# Patient Record
Sex: FEMALE | Race: WHITE | NOT HISPANIC OR LATINO | Employment: OTHER | URBAN - METROPOLITAN AREA
[De-identification: names, ages, dates, MRNs, and addresses within clinical notes are randomized per-mention and may not be internally consistent; named-entity substitution may affect disease eponyms.]

---

## 2017-01-24 ENCOUNTER — HISTORICAL (OUTPATIENT)
Dept: ADMINISTRATIVE | Facility: HOSPITAL | Age: 67
End: 2017-01-24

## 2017-03-07 ENCOUNTER — HISTORICAL (OUTPATIENT)
Dept: ADMINISTRATIVE | Facility: HOSPITAL | Age: 67
End: 2017-03-07

## 2017-03-21 ENCOUNTER — HISTORICAL (OUTPATIENT)
Dept: LAB | Facility: HOSPITAL | Age: 67
End: 2017-03-21

## 2017-03-27 ENCOUNTER — HISTORICAL (OUTPATIENT)
Dept: ADMINISTRATIVE | Facility: HOSPITAL | Age: 67
End: 2017-03-27

## 2017-04-18 ENCOUNTER — HISTORICAL (OUTPATIENT)
Dept: ADMINISTRATIVE | Facility: HOSPITAL | Age: 67
End: 2017-04-18

## 2017-05-18 ENCOUNTER — HISTORICAL (OUTPATIENT)
Dept: ADMINISTRATIVE | Facility: HOSPITAL | Age: 67
End: 2017-05-18

## 2018-05-23 ENCOUNTER — HISTORICAL (OUTPATIENT)
Dept: RADIOLOGY | Facility: HOSPITAL | Age: 68
End: 2018-05-23

## 2019-10-15 ENCOUNTER — HISTORICAL (OUTPATIENT)
Dept: ADMINISTRATIVE | Facility: HOSPITAL | Age: 69
End: 2019-10-15

## 2020-02-28 ENCOUNTER — HOSPITAL ENCOUNTER (OUTPATIENT)
Dept: MEDSURG UNIT | Facility: HOSPITAL | Age: 70
End: 2020-03-02
Attending: INTERNAL MEDICINE | Admitting: INTERNAL MEDICINE

## 2022-04-07 ENCOUNTER — HISTORICAL (OUTPATIENT)
Dept: ADMINISTRATIVE | Facility: HOSPITAL | Age: 72
End: 2022-04-07

## 2022-04-24 VITALS
WEIGHT: 213.88 LBS | BODY MASS INDEX: 35.63 KG/M2 | HEIGHT: 65 IN | SYSTOLIC BLOOD PRESSURE: 122 MMHG | DIASTOLIC BLOOD PRESSURE: 71 MMHG

## 2022-05-01 NOTE — HISTORICAL [facility]
This is a historical note converted from [employer]. Formatting and pictures may have been removed.  Please reference [facility] for original formatting and attached multimedia. Admit and Discharge Dates  Admit Date: 02/28/2020  Discharge Date: 03/02/2020  Physicians  Attending Physician - Isreal ORANTES, Elba  Admitting Physician - Isreal ORANTES, Elba  Primary Care Physician - Monika ORANTES, Jenny ASHRAF  Discharge Diagnosis  COPD with exacerbation?J44.1  Coronary artery disease?I25.10  Cough?[id number]  DM (diabetes mellitus)?E11.9  Hypertension  Anxiety/depression  History of YE  Hospital Course  69 -year-old? female with past medical history of?COPD, hypertension, diabetes mellitus, coronary artery disease YE admitted on 2/28 for?acute COPD exacerbation.??Chest x-ray was negative for any acute infiltrates. ?She was treated?for COPD exacerbation with IV?Solu-Medrol,?nebulizer treatments, Breo and oral Levaquin. ?Patient improved over next?2-3 days and was stable for discharge?on 3/2 with outpatient prednisone taper. ?She does not need?further antibiotics at this point. ?Patients blood glucose was elevated secondary to?IV steroids. ?Patient is requesting?short acting insulin with SSI on discharge.? She was given a prescription for NovoLog insulin.? She is to continue on her Lantus.? Patient is to resume her Symbicort and?Umeclidinium on discharge.? Patient was referred to?pulmonary MD outpatient.  Objective  Vitals & Measurements  T:?36.9? ?C (Oral)? TMIN:?36.4? ?C (Oral)? TMAX:?36.9? ?C (Oral)? HR:?90(Peripheral)? RR:?18? BP:?137/69? SpO2:?93%?  Physical Exam  General:?well-developed well-nourished in no acute distress  Respiratory:?clear to auscultation bilaterally  Cardiovascular:?regular rate and rhythm without murmurs, gallops or rubs, no edema  Gastrointestinal:?soft, non-tender, non-distended with normal bowel sounds, without masses to palpation  Neurologic: cranial nerves intact, no focal  deficits, motor/sensory function intact  Patient Discharge Condition  stable  Discharge Disposition  Home   Discharge Medication Reconciliation  Prescribed  budesonide-formoterol (Symbicort 160 mcg-4.5 mcg/inh inhalation aerosol)?2 puff(s), INH, BID  insulin aspart (NovoLOG PenFill 100 units/mL injectable solution)?See Instructions  predniSONE (prednisONE 10 mg oral tablet)?See Instructions  Continue  albuterol (Ventolin HFA 90 mcg/inh inhalation aerosol)?2 puff(s), INH, BID  albuterol-ipratropium (DuoNeb 0.5 mg-2.5 mg/3 mL inhalation solution)?3 mL, NEB, QID, PRN as needed for shortness of breath or wheezing  amlodipine (Norvasc 5 mg oral tablet)?5 mg, Oral, Daily  aspirin (Aspir 81 oral delayed release tablet)  atorvastatin (atorvastatin 40 mg oral tablet)?40 mg, Oral, qPM  diazepam (DIAZEPAM ? ? TAB 10MG)?10 mg, Oral, BID  escitalopram (Lexapro 20 mg oral tablet)?20 mg, Oral, Daily  gabapentin (GABAPENTIN ? CAP 300MG)?300 mg, Oral, BID  insulin glargine (Lantus 100 units/mL subcutaneous solution)?45 units, Subcutaneous, Once a day (at bedtime)  isosorbide mononitrate (ISOSORB MONO TAB 30MG ER)?30 mg, Oral, Daily  metoprolol (METOPROL SUC TAB 25MG ER)?25 mg, Oral, Daily  nitroglycerin (Nitrostat 0.4 mg sublingual tablet)?0.4 mg, SL, q5min, PRN for chest pain  omeprazole (omeprazole 20 mg oral DR capsule)?20 mg, Oral, Daily  polyethylene glycol 3350 (polyethylene glycol 3350 oral powder for reconstitution)?17 gm, Oral, Daily, PRN constipation  Discontinue  hydrOXYzine (hydrOXYzine hydrochloride 25 mg oral tablet)?25 mg, Oral, Daily, PRN itching  insulin regular (NovoLIN R 100 units/mL injectable solution)?See Instructions  meclizine (meclizine 25 mg oral tablet)?25 mg, Oral, TID, PRN for dizziness  meloxicam (Mobic 7.5 mg oral tablet)?7.5 mg, Oral, Daily  ondansetron (Zofran ODT 4 mg oral tablet, disintegrating)?1, Oral, q4hr, PRN nausea/vomiting  promethazine (Phenergan 12.5 mg Tab)?12.5 mg, Oral, q4hr, PRN for  nausea/vomiting  Education and Orders Provided  Chronic Obstructive Pulmonary Disease  Heart Failure (PMUSSO)  Discharge - 03/02/20 11:27:00 CST, Home, Give all scheduled vaccinations prior to discharge.?  Discharge Activity - Activity as Tolerated?  Discharge Diet - Diabetic?  Follow up  Follow up with primary care provider, within 1 to 2 weeks  Tay ORANTES, Prabhu ARIAS, within 2 ?to 4 weeks

## 2022-05-01 NOTE — HISTORICAL [facility]
This is a historical note converted from [employer]. Formatting and pictures may have been removed.  Please reference [employer] for original formatting and attached multimedia. Chief Complaint  patient reports having cough and congestion x4 months. now having R sided chest tightness and worsening SOB. hx COPD.  History of Present Illness  69-year-old white female with past medical history of?COPD, hypertension, diabetes mellitus, coronary artery disease YE presented to the ER with complaints of shortness of breath and wheezing. ?Patient reports that she has been having shortness of breath for the past few weeks but apparently last night and this morning she started hearing a lot of wheezing she took the inhalers at home but her wheezing did not resolve so she decided to come into the ER. ?Denies any fever chills or night sweats. ?Chest x-ray done here in the ER was negative for any acute infiltrates. ?She has been admitted to hospitalist service for COPD exacerbation  ?  Review of Systems  except as documented all other systems reviewed and negative  Physical Exam  Vitals & Measurements  T:?36.6? ?C (Oral)? HR:?63(Peripheral)? RR:?18? BP:?124/49? SpO2:?95%?  General:AAOX3  Eye: PERRLA, EOMI, clear conjunctiva, eyelids normal  HENT:?at/nc  Neck: full range of motion, no thyromegaly or lymphadenopathy  Respiratory:?Bilateral wheezing  Cardiovascular:?regular rate and rhythm without murmurs, gallops or rubs  Gastrointestinal:?soft, non-tender, non-distended with normal bowel sounds, without masses to palpation  Genitourinary: no CVA tenderness to palpation  Musculoskeletal:?full range of motion of all extremities/spine without limitation or discomfort  Integumentary: no rashes or skin lesions present  Neurologic: AAOX3  ?  Assessment/Plan  COPD exacerbation  Essential hypertension  Insulin-dependent diabetes mellitus type 2  Anxiety depression  Obstructive sleep apnea  ?  ?  We will start the patient on Solu-Medrol 60 mg IV  every 8, duo nebs, levaquin 500 mg p.o. daily  We will resume home blood pressure medications  We will start on home dose of insulin along with insulin sliding scale with Accu-Cheks before meals and at bedtime  ?  ?   Prophylaxis: Lovenox  ?   Problem List/Past Medical History  Ongoing  COPD (chronic obstructive pulmonary disease)  DM (diabetes mellitus)  Dyslipidemia  HTN  Obesity  Osteoarthritis of left ankle  Peroneal tendinitis of left lower leg  Preoperative clearance  Right rotator cuff tear  Status post rotator cuff repair  Historical  Anxiety  Arthritis  CHF - Congestive heart failure  Coronary artery disease  Depression  Kidney stones  Memory loss  PNA (pneumonia)  Reflux  Rheumatoid arthritis  Rib fracture  Sleep apnea  Procedure/Surgical History  Arthroscopy Shoulder (Right) (03/27/2017)  Arthroscopy, shoulder, surgical; biceps tenodesis (03/27/2017)  Arthroscopy, shoulder, surgical; distal claviculectomy including distal articular surface (Ernie procedure) (03/27/2017)  Arthroscopy, shoulder, surgical; with lysis and resection of adhesions, with or without manipulation (03/27/2017)  Arthroscopy, shoulder, surgical; with rotator cuff repair (03/27/2017)  Excision of Right Clavicle, Percutaneous Endoscopic Approach (03/27/2017)  Release Right Shoulder Joint, Percutaneous Endoscopic Approach (03/27/2017)  Repair Right Shoulder Tendon, Percutaneous Endoscopic Approach (03/27/2017)  Reposition Right Shoulder Tendon, Percutaneous Endoscopic Approach (03/27/2017)  CABG (2016)  Insertion of non-indwelling bladder catheter (eg, straight catheterization for residual urine) (06/26/2015)  cardiac stent  Cholecystectomy  Hernia repair  Hysterectomy   Medications  Inpatient  acetaminophen, 1000 mg= 2 tab(s), Oral, q6hr, PRN  albuterol 0.083% inhalation solution, 5 mg= 6 mL, NEB, Once-NOW, PRN  Aspir 81 oral delayed release tablet, 81 mg, Oral, Daily  atorvastatin 40 mg oral tablet, 40 mg= 1 tab(s), Oral,  qPM  DIAZepam 10 mg oral tablet, 10 mg= 2 tab(s), Oral, BID  DuoNeb, 3 mL, NEB, q6hr, PRN  DuoNeb, 3 mL, NEB, q4hr Resp  fluticasone-vilanterol 200 mcg-25 mcg/inh inhalation powder, 1 inh, INH, Daily  gabapentin 300 mg oral capsule, 300 mg= 1 cap(s), Oral, BID  ISOSORB MONO TAB 30MG ER, 30 mg, Oral, Daily  Lantus 100 units/mL subcutaneous solution, 45 units, Subcutaneous, Once a day (at bedtime)  Levaquin 500 mg oral tablet, 500 mg= 1 tab(s), Oral, Daily  Lexapro 10 mg oral tablet, 20 mg= 2 tab(s), Oral, Daily  METOPROL SUC TAB 25MG ER, 25 mg, Oral, Daily  Norco 5 mg-325 mg oral tablet, 1 tab(s), Oral, BID, PRN  Norvasc 5 mg oral tablet, 5 mg= 1 tab(s), Oral, Daily  Solumedrol IV push / IM, 60 mg= 1.5 mL, IV Push, q8hr  Zofran, 4 mg= 2 mL, IV Push, q4hr, PRN  Zofran 2 mg/mL injectable solution, 4 mg= 2 mL, IV Push, q4hr, PRN  Home  Aspir 81 oral delayed release tablet  atorvastatin 40 mg oral tablet, 40 mg= 1 tab(s), Oral, qPM  DIAZEPAM TAB 10MG, 10 mg= 1 tab(s), Oral, BID  DuoNeb 0.5 mg-2.5 mg/3 mL inhalation solution, 3 mL, NEB, QID, PRN  GABAPENTIN CAP 300MG, 300 mg= 1 cap(s), Oral, BID  hydrOXYzine hydrochloride 25 mg oral tablet, 25 mg= 1 tab(s), Oral, Daily, PRN  ISOSORB MONO TAB 30MG ER, 30 mg= 1 tab(s), Oral, Daily  Lantus 100 units/mL subcutaneous solution, 45 units, Subcutaneous, Once a day (at bedtime)  Lexapro 20 mg oral tablet, 20 mg= 1 tab(s), Oral, Daily  meclizine 25 mg oral tablet, 25 mg= 1 tab(s), Oral, TID, PRN  METOPROL SUC TAB 25MG ER, 25 mg= 1 tab(s), Oral, Daily  Mobic 7.5 mg oral tablet, 7.5 mg= 1 tab(s), Oral, Daily  Nitrostat 0.4 mg sublingual tablet, 0.4 mg= 1 tab(s), SL, q5min, PRN  Norvasc 5 mg oral tablet, 5 mg= 1 tab(s), Oral, Daily  NovoLIN R 100 units/mL injectable solution, See Instructions  omeprazole 20 mg oral DR capsule, 20 mg= 1 cap(s), Oral, Daily  Phenergan 12.5 mg Tab, 12.5 mg= 1 tab(s), Oral, q4hr, PRN  polyethylene glycol 3350 oral powder for reconstitution, 17 gm, Oral,  Daily, PRN  Symbicort 160 mcg-4.5 mcg/inh inhalation aerosol, 2 puff(s), INH, BID  Ventolin HFA 90 mcg/inh inhalation aerosol, 2 puff(s), INH, BID  Zofran ODT 4 mg oral tablet, disintegrating, 1, Oral, q4hr, PRN  Allergies  sulfa drugs?(bandaid)  Social History  Abuse/Neglect  No, 02/28/2020  No, 11/21/2019  Alcohol - Denies Alcohol Use, 06/17/2012  Employment/School  Home/Environment  Lives with Spouse., 03/24/2017  Nutrition/Health  Regular, 03/24/2017  Substance Use - Denies Substance Abuse, 06/17/2012  Tobacco - Denies Tobacco Use, 06/26/2015  Never (less than 100 in lifetime), No, 02/28/2020  Former smoker, quit more than 30 days ago, No, 11/21/2019  Family History  Family history is negative  Lab Results  Labs Last 24 Hours?  ?Chemistry? Hematology/Coagulation? Blood Gases?   Sodium Lvl: 138 mmol/L (02/28/20 12:15:00) WBC: 7 x10(3)/mcL (02/28/20 12:15:00) Sample ABG: art (02/28/20 15:30:00)   Potassium Lvl: 4.9 mmol/L (02/28/20 12:15:00) RBC:?4.01 x10(6)/mcL?Low (02/28/20 12:15:00) Treatment: RA (02/28/20 15:30:00)   Chloride: 106 mmol/L (02/28/20 12:15:00) Hgb: 12 gm/dL (02/28/20 12:15:00) Site: Radial Lt (02/28/20 15:30:00)   CO2: 29 mmol/L (02/28/20 12:15:00) Hct: 38.3 % (02/28/20 12:15:00) pH Art: 7.37 (02/28/20 15:30:00)   Calcium Lvl: 9.2 mg/dL (02/28/20 12:15:00) Platelet: 234 x10(3)/mcL (02/28/20 12:15:00) pCO2 Art: 44 mmHg (02/28/20 15:30:00)   Glucose Lvl:?162 mg/dL?High (02/28/20 12:15:00) MCV:?95.5 fL?High (02/28/20 12:15:00) pO2 Art:?66 mmHg?Low (02/28/20 15:30:00)   BUN: 15 mg/dL (02/28/20 12:15:00) MCH: 29.9 pg (02/28/20 12:15:00) HCO3 Art: 25.4 mmol/L (02/28/20 15:30:00)   Creatinine: 0.86 mg/dL (02/28/20 12:15:00) MCHC:?31.3 gm/dL?Low (02/28/20 12:15:00) CO2 Totl Art: 26.8 mmol/L (02/28/20 15:30:00)   eGFR-AA: >60 (02/28/20 12:15:00) RDW: 13.1 % (02/28/20 12:15:00) O2 Sat Art:?92.1 %?Low (02/28/20 15:30:00)   eGFR-AZIZA: >60 (02/28/20 12:15:00) MPV: 9.9 fL (02/28/20 12:15:00) D base: -0.1  (20 15:30:00)   Bili Total: 0.2 mg/dL (20:15:00) Abs Neut: 4.89 x10(3)/mcL (20:15:00) THB AB gm/dL (20 15:30:00)   Bili Direct: 0 mg/dL (20:15:00) Neutro Auto: 70 % (20:15:00) CO Hgb: 000.9 % (20 15:30:00)   Bili Indirect: 0.2 mg/dL (20:15:00) Lymph Auto: 16 % (20:15:00) Met Hgb Art: 0.8 % (02/28/20 15:30:00)   AST: 16 unit/L (20:15:00) Mono Auto: 7 % (20:15:00) O2 Hgb:?93.8 %?Low (02/28/20 15::00)   ALT: 24 unit/L (20:15:00) Eos Auto: 5 % (20:15:00) CaO2: 15.9 mL/dL (20 15:30:00)   Alk Phos:?129 unit/L?High (20 12:15:00) Abs Eos: 0.4 x10(3)/mcL (20:15:00) Ionized Calcium: 1.2 mmol/L (02/28/20 15:30:00)   Total Protein: 6.9 gm/dL (20:15:00) Basophil Auto: 1 % (20:15:00) Sodium RT: 137 mmol/L (20 15:30:00)   Albumin Lvl: 3.7 gm/dL (20:15:00) Abs Neutro: 4.89 x10(3)/mcL (20:15:00) Potassium RT: 4.3 mmol/L (02/28/20 15:30:00)   Globulin: 3.2 gm/dL (20 12:15:00) Abs Lymph: 1.1 x10(3)/mcL (20 12:15:00) Allens: N/A (20 15:30:00)   A/G Ratio: 1.2 (20 12:15:00) Abs Mono: 0.5 x10(3)/mcL (20 12:15:00)    POC Troponin: 0.01 ng/mL (20 12:19:00) Abs Baso: 0 x10(3)/mcL (20 12:15:00)

## 2022-05-26 DIAGNOSIS — R25.1 TREMOR: Primary | ICD-10-CM

## 2023-07-27 DIAGNOSIS — M25.512 LEFT SHOULDER PAIN: Primary | ICD-10-CM

## 2023-08-08 ENCOUNTER — OFFICE VISIT (OUTPATIENT)
Dept: ORTHOPEDICS | Facility: CLINIC | Age: 73
End: 2023-08-08
Payer: MEDICARE

## 2023-08-08 VITALS
SYSTOLIC BLOOD PRESSURE: 111 MMHG | HEART RATE: 56 BPM | DIASTOLIC BLOOD PRESSURE: 71 MMHG | WEIGHT: 213.88 LBS | BODY MASS INDEX: 36.51 KG/M2 | HEIGHT: 64 IN

## 2023-08-08 DIAGNOSIS — S46.012A TRAUMATIC INCOMPLETE TEAR OF LEFT ROTATOR CUFF, INITIAL ENCOUNTER: ICD-10-CM

## 2023-08-08 PROCEDURE — 1160F RVW MEDS BY RX/DR IN RCRD: CPT | Mod: CPTII,,, | Performed by: ORTHOPAEDIC SURGERY

## 2023-08-08 PROCEDURE — 3288F PR FALLS RISK ASSESSMENT DOCUMENTED: ICD-10-PCS | Mod: CPTII,,, | Performed by: ORTHOPAEDIC SURGERY

## 2023-08-08 PROCEDURE — 99214 OFFICE O/P EST MOD 30 MIN: CPT | Mod: ,,, | Performed by: ORTHOPAEDIC SURGERY

## 2023-08-08 PROCEDURE — 99214 PR OFFICE/OUTPT VISIT, EST, LEVL IV, 30-39 MIN: ICD-10-PCS | Mod: ,,, | Performed by: ORTHOPAEDIC SURGERY

## 2023-08-08 PROCEDURE — 1100F PTFALLS ASSESS-DOCD GE2>/YR: CPT | Mod: CPTII,,, | Performed by: ORTHOPAEDIC SURGERY

## 2023-08-08 PROCEDURE — 1125F AMNT PAIN NOTED PAIN PRSNT: CPT | Mod: CPTII,,, | Performed by: ORTHOPAEDIC SURGERY

## 2023-08-08 PROCEDURE — 3008F PR BODY MASS INDEX (BMI) DOCUMENTED: ICD-10-PCS | Mod: CPTII,,, | Performed by: ORTHOPAEDIC SURGERY

## 2023-08-08 PROCEDURE — 1160F PR REVIEW ALL MEDS BY PRESCRIBER/CLIN PHARMACIST DOCUMENTED: ICD-10-PCS | Mod: CPTII,,, | Performed by: ORTHOPAEDIC SURGERY

## 2023-08-08 PROCEDURE — 3074F SYST BP LT 130 MM HG: CPT | Mod: CPTII,,, | Performed by: ORTHOPAEDIC SURGERY

## 2023-08-08 PROCEDURE — 3078F PR MOST RECENT DIASTOLIC BLOOD PRESSURE < 80 MM HG: ICD-10-PCS | Mod: CPTII,,, | Performed by: ORTHOPAEDIC SURGERY

## 2023-08-08 PROCEDURE — 3288F FALL RISK ASSESSMENT DOCD: CPT | Mod: CPTII,,, | Performed by: ORTHOPAEDIC SURGERY

## 2023-08-08 PROCEDURE — 1125F PR PAIN SEVERITY QUANTIFIED, PAIN PRESENT: ICD-10-PCS | Mod: CPTII,,, | Performed by: ORTHOPAEDIC SURGERY

## 2023-08-08 PROCEDURE — 3008F BODY MASS INDEX DOCD: CPT | Mod: CPTII,,, | Performed by: ORTHOPAEDIC SURGERY

## 2023-08-08 PROCEDURE — 1100F PR PT FALLS ASSESS DOC 2+ FALLS/FALL W/INJURY/YR: ICD-10-PCS | Mod: CPTII,,, | Performed by: ORTHOPAEDIC SURGERY

## 2023-08-08 PROCEDURE — 1159F MED LIST DOCD IN RCRD: CPT | Mod: CPTII,,, | Performed by: ORTHOPAEDIC SURGERY

## 2023-08-08 PROCEDURE — 3074F PR MOST RECENT SYSTOLIC BLOOD PRESSURE < 130 MM HG: ICD-10-PCS | Mod: CPTII,,, | Performed by: ORTHOPAEDIC SURGERY

## 2023-08-08 PROCEDURE — 3078F DIAST BP <80 MM HG: CPT | Mod: CPTII,,, | Performed by: ORTHOPAEDIC SURGERY

## 2023-08-08 PROCEDURE — 1159F PR MEDICATION LIST DOCUMENTED IN MEDICAL RECORD: ICD-10-PCS | Mod: CPTII,,, | Performed by: ORTHOPAEDIC SURGERY

## 2023-08-08 RX ORDER — BLOOD SUGAR DIAGNOSTIC
STRIP MISCELLANEOUS 3 TIMES DAILY
Status: ON HOLD | COMMUNITY
Start: 2023-06-23

## 2023-08-08 RX ORDER — DIAZEPAM 10 MG/1
10 TABLET ORAL 2 TIMES DAILY PRN
Status: ON HOLD | COMMUNITY
Start: 2023-07-18

## 2023-08-08 RX ORDER — ESCITALOPRAM OXALATE 20 MG/1
20 TABLET ORAL DAILY
Status: ON HOLD | COMMUNITY
Start: 2023-07-17

## 2023-08-08 RX ORDER — BUPROPION HYDROCHLORIDE 150 MG/1
150 TABLET, EXTENDED RELEASE ORAL EVERY MORNING
Status: ON HOLD | COMMUNITY
Start: 2023-07-18

## 2023-08-08 RX ORDER — NITROGLYCERIN 0.4 MG/1
0.4 TABLET SUBLINGUAL EVERY 5 MIN PRN
Status: ON HOLD | COMMUNITY

## 2023-08-08 RX ORDER — ALBUTEROL SULFATE 0.63 MG/3ML
0.63 SOLUTION RESPIRATORY (INHALATION) EVERY 6 HOURS PRN
Status: ON HOLD | COMMUNITY

## 2023-08-08 RX ORDER — INSULIN GLARGINE 100 [IU]/ML
52 INJECTION, SOLUTION SUBCUTANEOUS NIGHTLY
Status: ON HOLD | COMMUNITY

## 2023-08-08 RX ORDER — LOSARTAN POTASSIUM AND HYDROCHLOROTHIAZIDE 12.5; 5 MG/1; MG/1
1 TABLET ORAL DAILY
Status: ON HOLD | COMMUNITY
Start: 2023-04-25

## 2023-08-08 RX ORDER — ISOSORBIDE MONONITRATE 60 MG/1
60 TABLET, EXTENDED RELEASE ORAL DAILY
Status: ON HOLD | COMMUNITY
Start: 2023-05-02

## 2023-08-08 RX ORDER — PEN NEEDLE, DIABETIC 32GX 5/32"
NEEDLE, DISPOSABLE MISCELLANEOUS
Status: ON HOLD | COMMUNITY
Start: 2023-03-22

## 2023-08-08 RX ORDER — MECLIZINE HYDROCHLORIDE 25 MG/1
25 TABLET ORAL 3 TIMES DAILY PRN
Status: ON HOLD | COMMUNITY
Start: 2023-05-20

## 2023-08-08 RX ORDER — PROPRANOLOL HYDROCHLORIDE 10 MG/1
10 TABLET ORAL 3 TIMES DAILY
Status: ON HOLD | COMMUNITY
Start: 2023-06-08

## 2023-08-08 NOTE — PROGRESS NOTES
" [facility]  [facility]thopedic Clinic Note      Chief Complaint:   Chief Complaint   Patient presents with    Left Shoulder - Pain    Shoulder Pain     Fell 12/2022 while walking. She thinks that she tripped. She fell on left shoulder and has worsening pain since.      Referring Physician: Peter Shrestha FNP      History of Present Illness:    This is a 72 y.o. year old female presenting with complaints of left shoulder pain since a fall that occurred in December 2022.  She states that she sustained a trip and fall with injury to her left shoulder.  That pain has progressively worsened and is impacting her range of motion and ability to perform her normal activities of daily living.  This shoulder pain is moderate to severe.  Patient reports increased pain with overhead movement.  Patient reports night pain.  Patient reports anterolateral shoulder pain that radiates down the arm.  She is been taking over-the-counter medications with no improvement in her symptoms.      Past Medical History:   Diagnosis Date    Anxiety disorder, unspecified     COPD (chronic obstructive pulmonary disease)     Depression     Diabetes mellitus, type 2     Hypertension        Past Surgical History:   Procedure Laterality Date    CHOLECYSTECTOMY      COLONOSCOPY      CORONARY ARTERY BYPASS GRAFT      HYSTERECTOMY      ROTATOR CUFF REPAIR Right        Current Outpatient Medications   Medication Sig    albuterol (ACCUNEB) 0.63 mg/3 mL Nebu Take 0.63 mg by nebulization every 6 (six) hours as needed. Rescue    BD KATY 2ND GEN PEN NEEDLE 32 gauge x 5/32" Ndle SMARTSIG:injection 3 Times Daily    buPROPion (WELLBUTRIN SR) 150 MG TBSR 12 hr tablet Take 150 mg by mouth every morning.    diazePAM (VALIUM) 10 MG Tab Take 10 mg by mouth 2 (two) times daily as needed.    EScitalopram oxalate (LEXAPRO) 20 MG tablet Take 20 mg by mouth.    fluticasone-umeclidin-vilanter (TRELEGY ELLIPTA) 100-62.5-25 mcg DsDv Inhale 1 puff into the lungs once " "daily.    insulin (BASAGLAR KWIKPEN U-100 INSULIN) glargine 100 units/mL SubQ pen Inject into the skin.    isosorbide mononitrate (IMDUR) 60 MG 24 hr tablet Take 60 mg by mouth.    losartan-hydrochlorothiazide 50-12.5 mg (HYZAAR) 50-12.5 mg per tablet Take 1 tablet by mouth.    meclizine (ANTIVERT) 25 mg tablet Take 25 mg by mouth.    nitroGLYCERIN (NITROSTAT) 0.4 MG SL tablet Place 0.4 mg under the tongue every 5 (five) minutes as needed for Chest pain.    ONETOUCH ULTRA TEST Strp 3 (three) times daily.    propranoloL (INDERAL) 10 MG tablet Take 10 mg by mouth 3 (three) times daily.     No current facility-administered medications for this visit.       Review of patient's allergies indicates:   Allergen Reactions    Sulfa (sulfonamide antibiotics)        History reviewed. No pertinent family history.    Social History     Socioeconomic History    Marital status:    Tobacco Use    Smoking status: Former     Current packs/day: 0.00     Types: Cigarettes     Quit date: 2013     Years since quitting: 10.6    Smokeless tobacco: Never   Substance and Sexual Activity    Alcohol use: Never           Review of Systems:  All review of systems negative except for those stated in the HPI.    Examination:    Vital Signs:    Vitals:    08/08/23 0805 08/08/23 0818   BP: 111/71    Pulse: (!) 56    Weight: 97 kg (213 lb 13.5 oz)    Height: 5' 4" (1.626 m)    PainSc:    7       Body mass index is 36.71 kg/m².    Physical Examination:  General: Well-developed, well-nourished.  Neuro: Alert and oriented x 3.  Psych: Normal mood and affect.  Card: Regular rate and rhythm  Resp: Respirations regular and unlabored  Left Shoulder Exam:  No obvious deformity. No medial or lateral scapula winging. Forward flexion to 80 degrees. Positive empty can, positive Whipple, Positive drop arm test, Manuel, impingement, Positive AC joint tenderness. Negative biceps groove tenderness, Positive Rasheed´s, Yergason´s, Speed test. Negative " Apprehension and Relocation test. 4/5 strength, normal skin appearance and palpable pulses distally. Sensibility normal.      Imaging:  Prior three views of the left shoulder demonstrate mild-to-moderate degenerative changes of the glenohumeral joint and acromioclavicular joint arthrosis.  No other acute osseous pathology.    Assessment: Traumatic incomplete tear of left rotator cuff, initial encounter  -     Ambulatory referral/consult to Orthopedics        Plan:  Prior x-rays were reviewed with the patient and her spouse.  Plan to proceed with left subacromial corticosteroid injection.  This should provide her with some acute pain relief.  She will continue over-the-counter Tylenol and anti-inflammatories as needed for pain.  She will be placed on a home exercise program utilizing resistance bands.  She will return to clinic in approximately 6 weeks for re-evaluation.  She and her spouse verbalized understanding of the plan of care with no further questions.    Watson Hennessy MD personally performed the services described in this documentation, including but not limited to patient's history, physical examination, and assessment and plan of care. All medical record entries made by JUNI Bal were performed at his direction and in his presence. The medical record was reviewed and is accurate and complete.         Follow up in about 6 weeks (around 9/19/2023) for Reevaluation.      DISCLAIMER: This note may have been dictated using voice recognition software and may contain grammatical errors.     NOTE: Consult report sent to referring provider via [facility].

## 2024-03-05 ENCOUNTER — OFFICE VISIT (OUTPATIENT)
Dept: ORTHOPEDICS | Facility: CLINIC | Age: 74
End: 2024-03-05
Payer: MEDICARE

## 2024-03-05 ENCOUNTER — HOSPITAL ENCOUNTER (OUTPATIENT)
Dept: RADIOLOGY | Facility: CLINIC | Age: 74
Discharge: HOME | End: 2024-03-05
Attending: ORTHOPAEDIC SURGERY
Payer: MEDICARE

## 2024-03-05 VITALS
DIASTOLIC BLOOD PRESSURE: 79 MMHG | HEART RATE: 64 BPM | BODY MASS INDEX: 34.31 KG/M2 | HEIGHT: 64 IN | WEIGHT: 201 LBS | SYSTOLIC BLOOD PRESSURE: 138 MMHG

## 2024-03-05 DIAGNOSIS — S46.012A TRAUMATIC INCOMPLETE TEAR OF LEFT ROTATOR CUFF, INITIAL ENCOUNTER: Primary | ICD-10-CM

## 2024-03-05 DIAGNOSIS — M25.512 CHRONIC LEFT SHOULDER PAIN: ICD-10-CM

## 2024-03-05 DIAGNOSIS — M25.512 LEFT SHOULDER PAIN, UNSPECIFIED CHRONICITY: ICD-10-CM

## 2024-03-05 DIAGNOSIS — G89.29 CHRONIC LEFT SHOULDER PAIN: ICD-10-CM

## 2024-03-05 PROCEDURE — 99214 OFFICE O/P EST MOD 30 MIN: CPT | Mod: ,,, | Performed by: ORTHOPAEDIC SURGERY

## 2024-03-05 PROCEDURE — 1101F PT FALLS ASSESS-DOCD LE1/YR: CPT | Mod: CPTII,,, | Performed by: ORTHOPAEDIC SURGERY

## 2024-03-05 PROCEDURE — 3008F BODY MASS INDEX DOCD: CPT | Mod: CPTII,,, | Performed by: ORTHOPAEDIC SURGERY

## 2024-03-05 PROCEDURE — 3288F FALL RISK ASSESSMENT DOCD: CPT | Mod: CPTII,,, | Performed by: ORTHOPAEDIC SURGERY

## 2024-03-05 PROCEDURE — 3078F DIAST BP <80 MM HG: CPT | Mod: CPTII,,, | Performed by: ORTHOPAEDIC SURGERY

## 2024-03-05 PROCEDURE — 3075F SYST BP GE 130 - 139MM HG: CPT | Mod: CPTII,,, | Performed by: ORTHOPAEDIC SURGERY

## 2024-03-05 PROCEDURE — 1159F MED LIST DOCD IN RCRD: CPT | Mod: CPTII,,, | Performed by: ORTHOPAEDIC SURGERY

## 2024-03-05 PROCEDURE — 1160F RVW MEDS BY RX/DR IN RCRD: CPT | Mod: CPTII,,, | Performed by: ORTHOPAEDIC SURGERY

## 2024-03-05 PROCEDURE — 73030 X-RAY EXAM OF SHOULDER: CPT | Mod: LT,,, | Performed by: ORTHOPAEDIC SURGERY

## 2024-03-05 RX ORDER — FLUTICASONE PROPIONATE 50 MCG
1 SPRAY, SUSPENSION (ML) NASAL 2 TIMES DAILY
COMMUNITY
Start: 2024-01-08

## 2024-03-05 RX ORDER — DOXEPIN HYDROCHLORIDE 10 MG/ML
10 SOLUTION ORAL NIGHTLY
COMMUNITY
Start: 2024-02-27 | End: 2024-03-19 | Stop reason: CLARIF

## 2024-03-05 NOTE — PROGRESS NOTES
[facility]  Orthopedic Clinic Note      Chief Complaint:   Chief Complaint   Patient presents with    Shoulder Pain     7mo f/u for left shoulder pain. Hx of left shoulder RTC tear 12/2022. States about 1.5 weeks ago she stood up and fell straight forward onto her shoulder. Has had pain since that radiates down bicep and is made worse with reaching and stretching movements. She received a steroid injection 8/8/23 which she states gave some relief for a few days. Xr done today.     Referring Physician: No ref. provider found      History of Present Illness:    This is a 73 y.o. year old female presenting with complaints of left shoulder pain since a fall that occurred in December 2022.  She states that she sustained a trip and fall with injury to her left shoulder.  That pain has progressively worsened and is impacting her range of motion and ability to perform her normal activities of daily living.  This shoulder pain is moderate to severe.  Patient reports increased pain with overhead movement.  Patient reports night pain.  Patient reports anterolateral shoulder pain that radiates down the arm.  She is been taking over-the-counter medications with no improvement in her symptoms.  03/05/2024 Patient presents for follow up on left shoulder pain.  She received a left subacromial corticosteroid injection at her prior visit.  She states that this was only helpful for a few days.  She has also sustained an additional injury since her prior visit.  She states that approximately 1.5 weeks ago she fell directly on the shoulder.  She remains quite symptomatic and this is impairing her ability to perform her normal activities of daily living.      Past Medical History:   Diagnosis Date    Anxiety disorder, unspecified     COPD (chronic obstructive pulmonary disease)     Depression     Diabetes mellitus, type 2     Hypertension        Past Surgical History:   Procedure Laterality Date    CHOLECYSTECTOMY       "COLONOSCOPY      CORONARY ARTERY BYPASS GRAFT      HYSTERECTOMY      ROTATOR CUFF REPAIR Right 03/27/2017       Current Outpatient Medications   Medication Sig    albuterol (ACCUNEB) 0.63 mg/3 mL Nebu Take 0.63 mg by nebulization every 6 (six) hours as needed. Rescue    BD KATY 2ND GEN PEN NEEDLE 32 gauge x 5/32" Ndle SMARTSIG:injection 3 Times Daily    buPROPion (WELLBUTRIN SR) 150 MG TBSR 12 hr tablet Take 150 mg by mouth every morning.    diazePAM (VALIUM) 10 MG Tab Take 10 mg by mouth 2 (two) times daily as needed.    doxepin (SINEQUAN) 10 mg/mL solution Take 10 mg by mouth every evening.    EScitalopram oxalate (LEXAPRO) 20 MG tablet Take 20 mg by mouth.    fluticasone propionate (FLONASE) 50 mcg/actuation nasal spray 1 spray by Each Nostril route 2 (two) times daily.    fluticasone-umeclidin-vilanter (TRELEGY ELLIPTA) 100-62.5-25 mcg DsDv Inhale 1 puff into the lungs once daily.    isosorbide mononitrate (IMDUR) 60 MG 24 hr tablet Take 60 mg by mouth.    losartan-hydrochlorothiazide 50-12.5 mg (HYZAAR) 50-12.5 mg per tablet Take 1 tablet by mouth.    meclizine (ANTIVERT) 25 mg tablet Take 25 mg by mouth.    nitroGLYCERIN (NITROSTAT) 0.4 MG SL tablet Place 0.4 mg under the tongue every 5 (five) minutes as needed for Chest pain.    ONETOUCH ULTRA TEST Strp 3 (three) times daily.    propranoloL (INDERAL) 10 MG tablet Take 10 mg by mouth 3 (three) times daily.    insulin (BASAGLAR KWIKPEN U-100 INSULIN) glargine 100 units/mL SubQ pen Inject into the skin.     No current facility-administered medications for this visit.       Review of patient's allergies indicates:   Allergen Reactions    Sulfa (sulfonamide antibiotics)        Family History   Problem Relation Age of Onset    No Known Problems Mother     No Known Problems Father        Social History     Socioeconomic History    Marital status:    Tobacco Use    Smoking status: Former     Current packs/day: 0.00     Types: Cigarettes     Quit date: 2013     " "Years since quittin.1    Smokeless tobacco: Never   Substance and Sexual Activity    Alcohol use: Never    Drug use: Never   Social History Narrative    ** Merged History Encounter **                Review of Systems:  All review of systems negative except for those stated in the HPI.    Examination:    Vital Signs:    Vitals:    24 1042   BP: 138/79   Pulse: 64   Weight: 91.2 kg (201 lb)   Height: 5' 4" (1.626 m)       Body mass index is 34.5 kg/m².    Physical Examination:  General: Well-developed, well-nourished.  Neuro: Alert and oriented x 3.  Psych: Normal mood and affect.  Card: Regular rate and rhythm  Resp: Respirations regular and unlabored  Left Shoulder Exam:  No obvious deformity. No medial or lateral scapula winging. Forward flexion to 80 degrees. Positive empty can, positive Whipple, Positive drop arm test, Manuel, impingement, Positive AC joint tenderness. Negative biceps groove tenderness, Positive Rasheed´s, Yergason´s, Speed test. Negative Apprehension and Relocation test. 4/5 strength, normal skin appearance and palpable pulses distally. Sensibility normal.      Imaging:  Three views of the left shoulder demonstrate mild-to-moderate degenerative changes of the glenohumeral joint and acromioclavicular joint arthrosis.  No other acute osseous pathology.    Assessment: Traumatic incomplete tear of left rotator cuff, initial encounter  -     MRI Shoulder Without Contrast Left; Future; Expected date: 2024    Left shoulder pain, unspecified chronicity  -     X-Ray Shoulder 2 or More Views Left; Future; Expected date: 2024    Chronic left shoulder pain  -     MRI Shoulder Without Contrast Left; Future; Expected date: 2024        Plan:  X-rays were reviewed with the patient and her spouse.  She remains symptomatic despite conservative treatments.  Order entered for MRI of the left shoulder to assess for suspected rotator cuff pathology.  She will return to clinic for review " of MRI results once imaging is obtained.  Pres[facility] routed [facility].  She and her spouse verbalized understanding of the plan of care with no further questions.    Watson Hennessy MD personally performed the services described in this documentation, including but not limited to patient's history, physical examination, and assessment and plan of care. All medical record entries made by JUNI Bal were performed at his direction and in his presence. The medical record was reviewed and is accurate and complete.         Follow up for review of MRI results.      DISCLAIMER: This note may have been dictated using voice recognition software and may contain grammatical errors.     NOTE: Consult report sent to referring provider via [facility] [employer].

## 2024-03-12 ENCOUNTER — HOSPITAL ENCOUNTER (OUTPATIENT)
Dept: RADIOLOGY | Facility: HOSPITAL | Age: 74
Discharge: HOME | End: 2024-03-12
Attending: ORTHOPAEDIC SURGERY
Payer: MEDICARE

## 2024-03-12 ENCOUNTER — OFFICE VISIT (OUTPATIENT)
Dept: ORTHOPEDICS | Facility: CLINIC | Age: 74
End: 2024-03-12
Payer: MEDICARE

## 2024-03-12 VITALS
SYSTOLIC BLOOD PRESSURE: 129 MMHG | BODY MASS INDEX: 34.33 KG/M2 | HEIGHT: 64 IN | DIASTOLIC BLOOD PRESSURE: 75 MMHG | HEART RATE: 87 BPM | WEIGHT: 201.06 LBS | TEMPERATURE: 98 F

## 2024-03-12 DIAGNOSIS — Z86.79 HISTORY OF CORONARY ARTERY DISEASE: ICD-10-CM

## 2024-03-12 DIAGNOSIS — M19.012 PRIMARY OSTEOARTHRITIS OF LEFT SHOULDER: Primary | ICD-10-CM

## 2024-03-12 DIAGNOSIS — Z79.4 DIABETES MELLITUS TYPE 2, INSULIN DEPENDENT: ICD-10-CM

## 2024-03-12 DIAGNOSIS — E11.9 DIABETES MELLITUS TYPE 2, INSULIN DEPENDENT: ICD-10-CM

## 2024-03-12 DIAGNOSIS — G89.29 CHRONIC LEFT SHOULDER PAIN: ICD-10-CM

## 2024-03-12 DIAGNOSIS — S46.012A TRAUMATIC INCOMPLETE TEAR OF LEFT ROTATOR CUFF, INITIAL ENCOUNTER: ICD-10-CM

## 2024-03-12 DIAGNOSIS — M19.012 PRIMARY OSTEOARTHRITIS OF LEFT SHOULDER: ICD-10-CM

## 2024-03-12 DIAGNOSIS — M25.512 CHRONIC LEFT SHOULDER PAIN: ICD-10-CM

## 2024-03-12 DIAGNOSIS — Z87.09 HISTORY OF COPD: ICD-10-CM

## 2024-03-12 PROCEDURE — 99214 OFFICE O/P EST MOD 30 MIN: CPT | Mod: ,,, | Performed by: ORTHOPAEDIC SURGERY

## 2024-03-12 PROCEDURE — 3074F SYST BP LT 130 MM HG: CPT | Mod: CPTII,,, | Performed by: ORTHOPAEDIC SURGERY

## 2024-03-12 PROCEDURE — 71046 X-RAY EXAM CHEST 2 VIEWS: CPT | Mod: TC

## 2024-03-12 PROCEDURE — 1160F RVW MEDS BY RX/DR IN RCRD: CPT | Mod: CPTII,,, | Performed by: ORTHOPAEDIC SURGERY

## 2024-03-12 PROCEDURE — 3078F DIAST BP <80 MM HG: CPT | Mod: CPTII,,, | Performed by: ORTHOPAEDIC SURGERY

## 2024-03-12 PROCEDURE — 1101F PT FALLS ASSESS-DOCD LE1/YR: CPT | Mod: CPTII,,, | Performed by: ORTHOPAEDIC SURGERY

## 2024-03-12 PROCEDURE — 3008F BODY MASS INDEX DOCD: CPT | Mod: CPTII,,, | Performed by: ORTHOPAEDIC SURGERY

## 2024-03-12 PROCEDURE — 3288F FALL RISK ASSESSMENT DOCD: CPT | Mod: CPTII,,, | Performed by: ORTHOPAEDIC SURGERY

## 2024-03-12 PROCEDURE — 1159F MED LIST DOCD IN RCRD: CPT | Mod: CPTII,,, | Performed by: ORTHOPAEDIC SURGERY

## 2024-03-12 RX ORDER — ACETAMINOPHEN 500 MG
1000 TABLET ORAL
Status: CANCELLED | OUTPATIENT
Start: 2024-03-12

## 2024-03-12 RX ORDER — TRANEXAMIC ACID 650 MG/1
1950 TABLET ORAL
Status: CANCELLED | OUTPATIENT
Start: 2024-03-12 | End: 2024-03-12

## 2024-03-12 RX ORDER — KETOROLAC TROMETHAMINE 10 MG/1
10 TABLET, FILM COATED ORAL
Status: CANCELLED | OUTPATIENT
Start: 2024-03-12 | End: 2024-03-12

## 2024-03-12 RX ORDER — SODIUM CHLORIDE, SODIUM GLUCONATE, SODIUM ACETATE, POTASSIUM CHLORIDE AND MAGNESIUM CHLORIDE 30; 37; 368; 526; 502 MG/100ML; MG/100ML; MG/100ML; MG/100ML; MG/100ML
INJECTION, SOLUTION INTRAVENOUS CONTINUOUS
Status: CANCELLED | OUTPATIENT
Start: 2024-03-12

## 2024-03-12 RX ORDER — LIDOCAINE AND PRILOCAINE 25; 25 MG/G; MG/G
CREAM TOPICAL
Status: ON HOLD | COMMUNITY
Start: 2024-03-05 | End: 2024-04-03 | Stop reason: ALTCHOICE

## 2024-03-12 RX ORDER — DICLOFENAC SODIUM 10 MG/G
GEL TOPICAL DAILY PRN
COMMUNITY
Start: 2024-03-05

## 2024-03-12 RX ORDER — GABAPENTIN 100 MG/1
300 CAPSULE ORAL
Status: CANCELLED | OUTPATIENT
Start: 2024-03-12

## 2024-03-12 RX ORDER — SCOLOPAMINE TRANSDERMAL SYSTEM 1 MG/1
1 PATCH, EXTENDED RELEASE TRANSDERMAL ONCE AS NEEDED
Status: CANCELLED | OUTPATIENT
Start: 2024-03-12 | End: 2035-08-09

## 2024-03-12 RX ORDER — ONDANSETRON 4 MG/1
4 TABLET, ORALLY DISINTEGRATING ORAL
Status: CANCELLED | OUTPATIENT
Start: 2024-03-12

## 2024-03-12 RX ORDER — CARIPRAZINE 1.5 MG/1
1.5 CAPSULE, GELATIN COATED ORAL DAILY
COMMUNITY
Start: 2024-03-01

## 2024-03-12 NOTE — PROGRESS NOTES
[facility]  Orthopedic Clinic Note      Chief Complaint:   Chief Complaint   Patient presents with    Results     Patient here today for MRI results left shoulder.     Referring Physician: No ref. provider found      History of Present Illness:    This is a 73 y.o. year old female presenting with complaints of left shoulder pain since a fall that occurred in December 2022.  She states that she sustained a trip and fall with injury to her left shoulder.  That pain has progressively worsened and is impacting her range of motion and ability to perform her normal activities of daily living.  This shoulder pain is moderate to severe.  Patient reports increased pain with overhead movement.  Patient reports night pain.  Patient reports anterolateral shoulder pain that radiates down the arm.  She is been taking over-the-counter medications with no improvement in her symptoms.  03/05/2024 Patient presents for follow up on left shoulder pain.  She received a left subacromial corticosteroid injection at her prior visit.  She states that this was only helpful for a few days.  She has also sustained an additional injury since her prior visit.  She states that approximately 1.5 weeks ago she fell directly on the shoulder.  She remains quite symptomatic and this is impairing her ability to perform her normal activities of daily living.  03/12/2024 Patient presents for follow up to review MRI results.  MRI of the left shoulder demonstrated interstitial tearing of the rotator cuff, degenerative changes in both the acromioclavicular and glenohumeral joint, as well as bicipital tenosynovitis.  Her symptoms are unchanged since her prior visit.      Past Medical History:   Diagnosis Date    Anxiety disorder, unspecified     COPD (chronic obstructive pulmonary disease)     Depression     Diabetes mellitus, type 2     Hypertension        Past Surgical History:   Procedure Laterality Date    CHOLECYSTECTOMY      COLONOSCOPY       "CORONARY ARTERY BYPASS GRAFT      HYSTERECTOMY      ROTATOR CUFF REPAIR Right 03/27/2017       Current Outpatient Medications   Medication Sig    albuterol (ACCUNEB) 0.63 mg/3 mL Nebu Take 0.63 mg by nebulization every 6 (six) hours as needed. Rescue    BD KATY 2ND GEN PEN NEEDLE 32 gauge x 5/32" Ndle SMARTSIG:injection 3 Times Daily    buPROPion (WELLBUTRIN SR) 150 MG TBSR 12 hr tablet Take 150 mg by mouth every morning.    diazePAM (VALIUM) 10 MG Tab Take 10 mg by mouth 2 (two) times daily as needed.    diclofenac sodium (VOLTAREN) 1 % Gel APPLY 2-4 GRAMS TO EACH PAINFUL AREA FOUR TIMES DAILY - MAX 32 GRAMS/DAY    doxepin (SINEQUAN) 10 mg/mL solution Take 10 mg by mouth every evening.    EScitalopram oxalate (LEXAPRO) 20 MG tablet Take 20 mg by mouth.    fluticasone propionate (FLONASE) 50 mcg/actuation nasal spray 1 spray by Each Nostril route 2 (two) times daily.    fluticasone-umeclidin-vilanter (TRELEGY ELLIPTA) 100-62.5-25 mcg DsDv Inhale 1 puff into the lungs once daily.    insulin (BASAGLAR KWIKPEN U-100 INSULIN) glargine 100 units/mL SubQ pen Inject into the skin.    isosorbide mononitrate (IMDUR) 60 MG 24 hr tablet Take 60 mg by mouth.    LIDOcaine-prilocaine (EMLA) cream APPLY 2 GRAMS 3-4 TIMES DAILY FOR TREATMENT OF PAIN    losartan-hydrochlorothiazide 50-12.5 mg (HYZAAR) 50-12.5 mg per tablet Take 1 tablet by mouth.    meclizine (ANTIVERT) 25 mg tablet Take 25 mg by mouth.    nitroGLYCERIN (NITROSTAT) 0.4 MG SL tablet Place 0.4 mg under the tongue every 5 (five) minutes as needed for Chest pain.    ONETOUCH ULTRA TEST Strp 3 (three) times daily.    propranoloL (INDERAL) 10 MG tablet Take 10 mg by mouth 3 (three) times daily.    VRAYLAR 1.5 mg Cap Take 1.5 mg by mouth.     No current facility-administered medications for this visit.       Review of patient's allergies indicates:   Allergen Reactions    Sulfa (sulfonamide antibiotics)        Family History   Problem Relation Age of Onset    No Known " "Problems Mother     No Known Problems Father        Social History     Socioeconomic History    Marital status:    Tobacco Use    Smoking status: Former     Current packs/day: 0.00     Types: Cigarettes     Quit date:      Years since quittin.2    Smokeless tobacco: Never   Substance and Sexual Activity    Alcohol use: Never    Drug use: Never   Social History Narrative    ** Merged History Encounter **                Review of Systems:  All review of systems negative except for those stated in the HPI.    Examination:    Vital Signs:    Vitals:    24 1029   BP: 129/75   Pulse: 87   Temp: 97.7 °F (36.5 °C)   Weight: 91.2 kg (201 lb 1 oz)   Height: 5' 4" (1.626 m)       Body mass index is 34.51 kg/m².    Physical Examination:  General: Well-developed, well-nourished.  Neuro: Alert and oriented x 3.  Psych: Normal mood and affect.  Card: Regular rate and rhythm  Resp: Respirations regular and unlabored  Left Shoulder Exam:  No obvious deformity. No medial or lateral scapula winging. Forward flexion to 80 degrees. Positive empty can, positive Whipple, Positive drop arm test, Manuel, impingement, Positive AC joint tenderness. Negative biceps groove tenderness, Positive Rasheed´s, Yergason´s, Speed test. Negative Apprehension and Relocation test. 4/5 strength, normal skin appearance and palpable pulses distally. Sensibility normal.      Imaging:  Three views of the left shoulder demonstrate mild-to-moderate degenerative changes of the glenohumeral joint and acromioclavicular joint arthrosis.  No other acute osseous pathology.    Assessment: Primary osteoarthritis of left shoulder  -     Vital signs; Standing  -     Insert peripheral IV; Standing  -     Clip and Prep Other (please specifiy) (Operative site); Standing  -     Cleanse with Chlorhexidine (CHG); Standing  -     Diet NPO; Standing  -     electrolyte-A infusion  -     ceFAZolin (ANCEF) 2 g in dextrose 5 % (D5W) 50 mL IVPB  -     " acetaminophen tablet 1,000 mg  -     ketorolac tablet 10 mg  -     gabapentin capsule 300 mg  -     ondansetron disintegrating tablet 4 mg  -     scopolamine 1.3-1.5 mg (1 mg over 3 days) 1 patch  -     tranexamic acid (LYSTEDA) tablet 1,950 mg  -     POCT glucose; Standing  -     CBC auto differential; Future  -     Comprehensive metabolic panel; Future  -     X-Ray Chest PA And Lateral; Future; Expected date: 03/12/2024  -     EKG 12-lead; Future  -     Inpatient consult to Anesthesiology; Standing  -     Case Request Operating Room: ARTHROPLASTY, SHOULDER, TOTAL, REVERSE  -     Admit to Inpatient; Standing  -     Place JOE hose; Standing  -     Place sequential compression device; Standing  -     Hemoglobin A1c; Future  -     MRSA PCR; Future; Expected date: 03/12/2024  -     CT Shoulder Without Contrast Left; Future; Expected date: 03/12/2024    Traumatic incomplete tear of left rotator cuff, initial encounter  -     CT Shoulder Without Contrast Left; Future; Expected date: 03/12/2024    History of COPD    History of coronary artery disease    Diabetes mellitus type 2, insulin dependent  -     CBC auto differential; Future  -     Hemoglobin A1c; Future    Chronic left shoulder pain  -     CT Shoulder Without Contrast Left; Future; Expected date: 03/12/2024    Other orders  -     IP VTE LOW RISK PATIENT; Standing        Plan:  MRI results were reviewed with the patient and her spouse.  I am concerned about the integrity of her rotator cuff tendon in her ability to heal appropriately following rotator cuff repair.  She also has osteoarthritic changes in the shoulder.  For that reason, recommend surgical intervention via left reverse total shoulder arthroplasty.  Patient is interested in proceeding with surgical intervention on 04/03/2024.  Plan to obtain preoperative cardiac evaluation from her cardiologist, Dr. Winn.  Order entered for CT scan of the shoulder for preoperative surgical planning of shoulder  replacement.  This will assist with provide a better functional outcome, quicker recovery, and reduced likelihood of intraoperative and postoperative complications.  Patient placed on a shoulder home exercise program to be performed 2-3x/week preoperatively.  We had an extensive discussion about the surgical procedure, the perioperative recovery, and postoperative recovery.  The proposed procedure as well as associated risks/benefits were discussed at length with the patient and family with risks to include pain, bleeding, infection, future surgeries, neurovascular compromise, loss of limb, heart attack, stroke, deep vein thrombosis, and even death.  Patient understands risks, benefits, and alternatives.  Patient signed an informed consent.  Patient will be placed on aspirin for DVT prophylaxis.      This patient has an active or unstable comorbidity that significantly increases the mortality risk and require more than 24 hours of postoperative monitoring.    This comorbidity is: Coronary artery disease (CAD) by history, COPD, and diabetes mellitus type 2      Watson Hennessy MD personally performed the services described in this documentation, including but not limited to patient's history, physical examination, and assessment and plan of care. All medical record entries made by JUNI Bal were performed at his direction and in his presence. The medical record was reviewed and is accurate and complete.         No follow-ups on file.      DISCLAIMER: This note may have been dictated using voice recognition software and may contain grammatical errors.     NOTE: Consult report sent to referring provider via [email or web address].

## 2024-03-12 NOTE — H&P (VIEW-ONLY)
[facility]  Orthopedic Clinic Note      Chief Complaint:   Chief Complaint   Patient presents with    Results     Patient here today for MRI results left shoulder.     Referring Physician: No ref. provider found      History of Present Illness:    This is a 73 y.o. year old female presenting with complaints of left shoulder pain since a fall that occurred in December 2022.  She states that she sustained a trip and fall with injury to her left shoulder.  That pain has progressively worsened and is impacting her range of motion and ability to perform her normal activities of daily living.  This shoulder pain is moderate to severe.  Patient reports increased pain with overhead movement.  Patient reports night pain.  Patient reports anterolateral shoulder pain that radiates down the arm.  She is been taking over-the-counter medications with no improvement in her symptoms.  03/05/2024 Patient presents for follow up on left shoulder pain.  She received a left subacromial corticosteroid injection at her prior visit.  She states that this was only helpful for a few days.  She has also sustained an additional injury since her prior visit.  She states that approximately 1.5 weeks ago she fell directly on the shoulder.  She remains quite symptomatic and this is impairing her ability to perform her normal activities of daily living.  03/12/2024 Patient presents for follow up to review MRI results.  MRI of the left shoulder demonstrated interstitial tearing of the rotator cuff, degenerative changes in both the acromioclavicular and glenohumeral joint, as well as bicipital tenosynovitis.  Her symptoms are unchanged since her prior visit.      Past Medical History:   Diagnosis Date    Anxiety disorder, unspecified     COPD (chronic obstructive pulmonary disease)     Depression     Diabetes mellitus, type 2     Hypertension        Past Surgical History:   Procedure Laterality Date    CHOLECYSTECTOMY      COLONOSCOPY       "CORONARY ARTERY BYPASS GRAFT      HYSTERECTOMY      ROTATOR CUFF REPAIR Right 03/27/2017       Current Outpatient Medications   Medication Sig    albuterol (ACCUNEB) 0.63 mg/3 mL Nebu Take 0.63 mg by nebulization every 6 (six) hours as needed. Rescue    BD KATY 2ND GEN PEN NEEDLE 32 gauge x 5/32" Ndle SMARTSIG:injection 3 Times Daily    buPROPion (WELLBUTRIN SR) 150 MG TBSR 12 hr tablet Take 150 mg by mouth every morning.    diazePAM (VALIUM) 10 MG Tab Take 10 mg by mouth 2 (two) times daily as needed.    diclofenac sodium (VOLTAREN) 1 % Gel APPLY 2-4 GRAMS TO EACH PAINFUL AREA FOUR TIMES DAILY - MAX 32 GRAMS/DAY    doxepin (SINEQUAN) 10 mg/mL solution Take 10 mg by mouth every evening.    EScitalopram oxalate (LEXAPRO) 20 MG tablet Take 20 mg by mouth.    fluticasone propionate (FLONASE) 50 mcg/actuation nasal spray 1 spray by Each Nostril route 2 (two) times daily.    fluticasone-umeclidin-vilanter (TRELEGY ELLIPTA) 100-62.5-25 mcg DsDv Inhale 1 puff into the lungs once daily.    insulin (BASAGLAR KWIKPEN U-100 INSULIN) glargine 100 units/mL SubQ pen Inject into the skin.    isosorbide mononitrate (IMDUR) 60 MG 24 hr tablet Take 60 mg by mouth.    LIDOcaine-prilocaine (EMLA) cream APPLY 2 GRAMS 3-4 TIMES DAILY FOR TREATMENT OF PAIN    losartan-hydrochlorothiazide 50-12.5 mg (HYZAAR) 50-12.5 mg per tablet Take 1 tablet by mouth.    meclizine (ANTIVERT) 25 mg tablet Take 25 mg by mouth.    nitroGLYCERIN (NITROSTAT) 0.4 MG SL tablet Place 0.4 mg under the tongue every 5 (five) minutes as needed for Chest pain.    ONETOUCH ULTRA TEST Strp 3 (three) times daily.    propranoloL (INDERAL) 10 MG tablet Take 10 mg by mouth 3 (three) times daily.    VRAYLAR 1.5 mg Cap Take 1.5 mg by mouth.     No current facility-administered medications for this visit.       Review of patient's allergies indicates:   Allergen Reactions    Sulfa (sulfonamide antibiotics)        Family History   Problem Relation Age of Onset    No Known " "Problems Mother     No Known Problems Father        Social History     Socioeconomic History    Marital status:    Tobacco Use    Smoking status: Former     Current packs/day: 0.00     Types: Cigarettes     Quit date:      Years since quittin.2    Smokeless tobacco: Never   Substance and Sexual Activity    Alcohol use: Never    Drug use: Never   Social History Narrative    ** Merged History Encounter **                Review of Systems:  All review of systems negative except for those stated in the HPI.    Examination:    Vital Signs:    Vitals:    24 1029   BP: 129/75   Pulse: 87   Temp: 97.7 °F (36.5 °C)   Weight: 91.2 kg (201 lb 1 oz)   Height: 5' 4" (1.626 m)       Body mass index is 34.51 kg/m².    Physical Examination:  General: Well-developed, well-nourished.  Neuro: Alert and oriented x 3.  Psych: Normal mood and affect.  Card: Regular rate and rhythm  Resp: Respirations regular and unlabored  Left Shoulder Exam:  No obvious deformity. No medial or lateral scapula winging. Forward flexion to 80 degrees. Positive empty can, positive Whipple, Positive drop arm test, Manuel, impingement, Positive AC joint tenderness. Negative biceps groove tenderness, Positive Rasheed´s, Yergason´s, Speed test. Negative Apprehension and Relocation test. 4/5 strength, normal skin appearance and palpable pulses distally. Sensibility normal.      Imaging:  Three views of the left shoulder demonstrate mild-to-moderate degenerative changes of the glenohumeral joint and acromioclavicular joint arthrosis.  No other acute osseous pathology.    Assessment: Primary osteoarthritis of left shoulder  -     Vital signs; Standing  -     Insert peripheral IV; Standing  -     Clip and Prep Other (please specifiy) (Operative site); Standing  -     Cleanse with Chlorhexidine (CHG); Standing  -     Diet NPO; Standing  -     electrolyte-A infusion  -     ceFAZolin (ANCEF) 2 g in dextrose 5 % (D5W) 50 mL IVPB  -     " acetaminophen tablet 1,000 mg  -     ketorolac tablet 10 mg  -     gabapentin capsule 300 mg  -     ondansetron disintegrating tablet 4 mg  -     scopolamine 1.3-1.5 mg (1 mg over 3 days) 1 patch  -     tranexamic acid (LYSTEDA) tablet 1,950 mg  -     POCT glucose; Standing  -     CBC auto differential; Future  -     Comprehensive metabolic panel; Future  -     X-Ray Chest PA And Lateral; Future; Expected date: 03/12/2024  -     EKG 12-lead; Future  -     Inpatient consult to Anesthesiology; Standing  -     Case Request Operating Room: ARTHROPLASTY, SHOULDER, TOTAL, REVERSE  -     Admit to Inpatient; Standing  -     Place JOE hose; Standing  -     Place sequential compression device; Standing  -     Hemoglobin A1c; Future  -     MRSA PCR; Future; Expected date: 03/12/2024  -     CT Shoulder Without Contrast Left; Future; Expected date: 03/12/2024    Traumatic incomplete tear of left rotator cuff, initial encounter  -     CT Shoulder Without Contrast Left; Future; Expected date: 03/12/2024    History of COPD    History of coronary artery disease    Diabetes mellitus type 2, insulin dependent  -     CBC auto differential; Future  -     Hemoglobin A1c; Future    Chronic left shoulder pain  -     CT Shoulder Without Contrast Left; Future; Expected date: 03/12/2024    Other orders  -     IP VTE LOW RISK PATIENT; Standing        Plan:  MRI results were reviewed with the patient and her spouse.  I am concerned about the integrity of her rotator cuff tendon in her ability to heal appropriately following rotator cuff repair.  She also has osteoarthritic changes in the shoulder.  For that reason, recommend surgical intervention via left reverse total shoulder arthroplasty.  Patient is interested in proceeding with surgical intervention on 04/03/2024.  Plan to obtain preoperative cardiac evaluation from her cardiologist, Dr. Winn.  Order entered for CT scan of the shoulder for preoperative surgical planning of shoulder  replacement.  This will assist with provide a better functional outcome, quicker recovery, and reduced likelihood of intraoperative and postoperative complications.  Patient placed on a shoulder home exercise program to be performed 2-3x/week preoperatively.  We had an extensive discussion about the surgical procedure, the perioperative recovery, and postoperative recovery.  The proposed procedure as well as associated risks/benefits were discussed at length with the patient and family with risks to include pain, bleeding, infection, future surgeries, neurovascular compromise, loss of limb, heart attack, stroke, deep vein thrombosis, and even death.  Patient understands risks, benefits, and alternatives.  Patient signed an informed consent.  Patient will be placed on aspirin for DVT prophylaxis.      This patient has an active or unstable comorbidity that significantly increases the mortality risk and require more than 24 hours of postoperative monitoring.    This comorbidity is: Coronary artery disease (CAD) by history, COPD, and diabetes mellitus type 2      Watson Hennessy MD personally performed the services described in this documentation, including but not limited to patient's history, physical examination, and assessment and plan of care. All medical record entries made by JUNI Bal were performed at his direction and in his presence. The medical record was reviewed and is accurate and complete.         No follow-ups on file.      DISCLAIMER: This note may have been dictated using voice recognition software and may contain grammatical errors.     NOTE: Consult report sent to referring provider via [employer].

## 2024-03-19 RX ORDER — DOXEPIN HYDROCHLORIDE 10 MG/1
10 CAPSULE ORAL NIGHTLY
COMMUNITY

## 2024-04-01 ENCOUNTER — ANESTHESIA EVENT (OUTPATIENT)
Dept: SURGERY | Facility: HOSPITAL | Age: 74
DRG: 483 | End: 2024-04-01
Payer: MEDICARE

## 2024-04-03 ENCOUNTER — ANESTHESIA (OUTPATIENT)
Dept: SURGERY | Facility: HOSPITAL | Age: 74
DRG: 483 | End: 2024-04-03
Payer: MEDICARE

## 2024-04-03 ENCOUNTER — HOSPITAL ENCOUNTER (INPATIENT)
Facility: HOSPITAL | Age: 74
LOS: 6 days | Discharge: NURSING, REHAB OR LONG-TERM CARE | DRG: 483 | End: 2024-04-09
Attending: ORTHOPAEDIC SURGERY | Admitting: ORTHOPAEDIC SURGERY
Payer: MEDICARE

## 2024-04-03 DIAGNOSIS — M19.012 PRIMARY OSTEOARTHRITIS OF LEFT SHOULDER: ICD-10-CM

## 2024-04-03 DIAGNOSIS — S46.012A TRAUMATIC INCOMPLETE TEAR OF LEFT ROTATOR CUFF, INITIAL ENCOUNTER: Primary | ICD-10-CM

## 2024-04-03 DIAGNOSIS — Z86.79 HISTORY OF CORONARY ARTERY DISEASE: ICD-10-CM

## 2024-04-03 LAB
HCT VFR BLD AUTO: 32.6 % (ref 37–47)
HGB BLD-MCNC: 10.8 G/DL (ref 12–16)
POCT GLUCOSE: 131 MG/DL (ref 70–110)
POCT GLUCOSE: 211 MG/DL (ref 70–110)
POCT GLUCOSE: 97 MG/DL (ref 70–110)

## 2024-04-03 PROCEDURE — 3E0T3BZ INTRODUCTION OF ANESTHETIC AGENT INTO PERIPHERAL NERVES AND PLEXI, PERCUTANEOUS APPROACH: ICD-10-PCS | Performed by: ORTHOPAEDIC SURGERY

## 2024-04-03 PROCEDURE — 25000003 PHARM REV CODE 250

## 2024-04-03 PROCEDURE — 37000008 HC ANESTHESIA 1ST 15 MINUTES: Performed by: ORTHOPAEDIC SURGERY

## 2024-04-03 PROCEDURE — 63600175 PHARM REV CODE 636 W HCPCS: Performed by: ORTHOPAEDIC SURGERY

## 2024-04-03 PROCEDURE — 71000033 HC RECOVERY, INTIAL HOUR: Performed by: ORTHOPAEDIC SURGERY

## 2024-04-03 PROCEDURE — 23472 RECONSTRUCT SHOULDER JOINT: CPT | Mod: LT,,, | Performed by: ORTHOPAEDIC SURGERY

## 2024-04-03 PROCEDURE — 85018 HEMOGLOBIN: CPT | Performed by: ORTHOPAEDIC SURGERY

## 2024-04-03 PROCEDURE — 51798 US URINE CAPACITY MEASURE: CPT

## 2024-04-03 PROCEDURE — 25000003 PHARM REV CODE 250: Performed by: NURSE PRACTITIONER

## 2024-04-03 PROCEDURE — 82962 GLUCOSE BLOOD TEST: CPT | Performed by: ORTHOPAEDIC SURGERY

## 2024-04-03 PROCEDURE — 63600175 PHARM REV CODE 636 W HCPCS: Performed by: NURSE ANESTHETIST, CERTIFIED REGISTERED

## 2024-04-03 PROCEDURE — 63600175 PHARM REV CODE 636 W HCPCS

## 2024-04-03 PROCEDURE — 25000003 PHARM REV CODE 250: Performed by: ORTHOPAEDIC SURGERY

## 2024-04-03 PROCEDURE — 23472 RECONSTRUCT SHOULDER JOINT: CPT | Mod: AS,LT,,

## 2024-04-03 PROCEDURE — C1769 GUIDE WIRE: HCPCS | Performed by: ORTHOPAEDIC SURGERY

## 2024-04-03 PROCEDURE — D9220A PRA ANESTHESIA: Mod: CRNA,,, | Performed by: NURSE ANESTHETIST, CERTIFIED REGISTERED

## 2024-04-03 PROCEDURE — 94761 N-INVAS EAR/PLS OXIMETRY MLT: CPT

## 2024-04-03 PROCEDURE — 27201423 OPTIME MED/SURG SUP & DEVICES STERILE SUPPLY: Performed by: ORTHOPAEDIC SURGERY

## 2024-04-03 PROCEDURE — 0LS40ZZ REPOSITION LEFT UPPER ARM TENDON, OPEN APPROACH: ICD-10-PCS | Performed by: ORTHOPAEDIC SURGERY

## 2024-04-03 PROCEDURE — 63600175 PHARM REV CODE 636 W HCPCS: Performed by: NURSE PRACTITIONER

## 2024-04-03 PROCEDURE — 11000001 HC ACUTE MED/SURG PRIVATE ROOM

## 2024-04-03 PROCEDURE — 94799 UNLISTED PULMONARY SVC/PX: CPT | Mod: XB

## 2024-04-03 PROCEDURE — 27000221 HC OXYGEN, UP TO 24 HOURS

## 2024-04-03 PROCEDURE — 37000009 HC ANESTHESIA EA ADD 15 MINS: Performed by: ORTHOPAEDIC SURGERY

## 2024-04-03 PROCEDURE — 0RRK00Z REPLACEMENT OF LEFT SHOULDER JOINT WITH REVERSE BALL AND SOCKET SYNTHETIC SUBSTITUTE, OPEN APPROACH: ICD-10-PCS | Performed by: ORTHOPAEDIC SURGERY

## 2024-04-03 PROCEDURE — 97166 OT EVAL MOD COMPLEX 45 MIN: CPT

## 2024-04-03 PROCEDURE — 88305 TISSUE EXAM BY PATHOLOGIST: CPT | Performed by: ORTHOPAEDIC SURGERY

## 2024-04-03 PROCEDURE — 25000003 PHARM REV CODE 250: Performed by: NURSE ANESTHETIST, CERTIFIED REGISTERED

## 2024-04-03 PROCEDURE — 99900031 HC PATIENT EDUCATION (STAT)

## 2024-04-03 PROCEDURE — 88311 DECALCIFY TISSUE: CPT

## 2024-04-03 PROCEDURE — 64415 NJX AA&/STRD BRCH PLXS IMG: CPT | Performed by: ANESTHESIOLOGY

## 2024-04-03 PROCEDURE — C1776 JOINT DEVICE (IMPLANTABLE): HCPCS | Performed by: ORTHOPAEDIC SURGERY

## 2024-04-03 PROCEDURE — 36000710: Performed by: ORTHOPAEDIC SURGERY

## 2024-04-03 PROCEDURE — 36000711: Performed by: ORTHOPAEDIC SURGERY

## 2024-04-03 PROCEDURE — C1713 ANCHOR/SCREW BN/BN,TIS/BN: HCPCS | Performed by: ORTHOPAEDIC SURGERY

## 2024-04-03 PROCEDURE — D9220A PRA ANESTHESIA: Mod: ANES,,, | Performed by: ANESTHESIOLOGY

## 2024-04-03 PROCEDURE — 63600175 PHARM REV CODE 636 W HCPCS: Performed by: ANESTHESIOLOGY

## 2024-04-03 PROCEDURE — 97162 PT EVAL MOD COMPLEX 30 MIN: CPT

## 2024-04-03 DEVICE — IMPLANTABLE DEVICE
Type: IMPLANTABLE DEVICE | Site: SHOULDER | Status: FUNCTIONAL
Brand: TORNIER FLEX SHOULDER SYSTEM

## 2024-04-03 DEVICE — IMPLANTABLE DEVICE
Type: IMPLANTABLE DEVICE | Site: SHOULDER | Status: FUNCTIONAL
Brand: TORNIER PERFORM® REVERSED GLENOID

## 2024-04-03 DEVICE — BASEPLATE GLENOID REV 18X5MM: Type: IMPLANTABLE DEVICE | Site: SHOULDER | Status: FUNCTIONAL

## 2024-04-03 DEVICE — BASEPLATE GLENOID REV 30X6.5MM: Type: IMPLANTABLE DEVICE | Site: SHOULDER | Status: FUNCTIONAL

## 2024-04-03 DEVICE — IMPLANTABLE DEVICE
Type: IMPLANTABLE DEVICE | Site: SHOULDER | Status: FUNCTIONAL
Brand: AEQUALIS™ PERFORM+ REVERSED

## 2024-04-03 RX ORDER — SCOLOPAMINE TRANSDERMAL SYSTEM 1 MG/1
1 PATCH, EXTENDED RELEASE TRANSDERMAL ONCE AS NEEDED
Status: DISCONTINUED | OUTPATIENT
Start: 2024-04-03 | End: 2024-04-03 | Stop reason: HOSPADM

## 2024-04-03 RX ORDER — DULOXETIN HYDROCHLORIDE 60 MG/1
60 CAPSULE, DELAYED RELEASE ORAL NIGHTLY
COMMUNITY

## 2024-04-03 RX ORDER — DOCUSATE SODIUM 100 MG/1
200 CAPSULE, LIQUID FILLED ORAL DAILY
Status: DISCONTINUED | OUTPATIENT
Start: 2024-04-04 | End: 2024-04-03

## 2024-04-03 RX ORDER — IBUPROFEN 200 MG
16 TABLET ORAL
Status: DISCONTINUED | OUTPATIENT
Start: 2024-04-03 | End: 2024-04-09 | Stop reason: HOSPADM

## 2024-04-03 RX ORDER — ISOSORBIDE MONONITRATE 60 MG/1
60 TABLET, EXTENDED RELEASE ORAL DAILY
Status: DISCONTINUED | OUTPATIENT
Start: 2024-04-04 | End: 2024-04-09 | Stop reason: HOSPADM

## 2024-04-03 RX ORDER — AMOXICILLIN 250 MG
2 CAPSULE ORAL NIGHTLY
Status: DISCONTINUED | OUTPATIENT
Start: 2024-04-03 | End: 2024-04-09 | Stop reason: HOSPADM

## 2024-04-03 RX ORDER — ONDANSETRON HYDROCHLORIDE 2 MG/ML
4 INJECTION, SOLUTION INTRAVENOUS EVERY 6 HOURS PRN
Status: DISCONTINUED | OUTPATIENT
Start: 2024-04-03 | End: 2024-04-09 | Stop reason: HOSPADM

## 2024-04-03 RX ORDER — KETOROLAC TROMETHAMINE 10 MG/1
10 TABLET, FILM COATED ORAL EVERY 6 HOURS
Status: DISCONTINUED | OUTPATIENT
Start: 2024-04-03 | End: 2024-04-03

## 2024-04-03 RX ORDER — ALBUTEROL SULFATE 0.63 MG/3ML
0.63 SOLUTION RESPIRATORY (INHALATION) EVERY 6 HOURS PRN
Status: DISCONTINUED | OUTPATIENT
Start: 2024-04-03 | End: 2024-04-09 | Stop reason: HOSPADM

## 2024-04-03 RX ORDER — VANCOMYCIN HYDROCHLORIDE 1 G/20ML
INJECTION, POWDER, LYOPHILIZED, FOR SOLUTION INTRAVENOUS
Status: DISPENSED
Start: 2024-04-03 | End: 2024-04-03

## 2024-04-03 RX ORDER — TRANEXAMIC ACID 650 MG/1
1950 TABLET ORAL
Status: COMPLETED | OUTPATIENT
Start: 2024-04-03 | End: 2024-04-03

## 2024-04-03 RX ORDER — ROCURONIUM BROMIDE 10 MG/ML
INJECTION, SOLUTION INTRAVENOUS
Status: DISCONTINUED | OUTPATIENT
Start: 2024-04-03 | End: 2024-04-03

## 2024-04-03 RX ORDER — IBUPROFEN 200 MG
24 TABLET ORAL
Status: DISCONTINUED | OUTPATIENT
Start: 2024-04-03 | End: 2024-04-09 | Stop reason: HOSPADM

## 2024-04-03 RX ORDER — ONDANSETRON 8 MG/1
8 TABLET, ORALLY DISINTEGRATING ORAL 3 TIMES DAILY PRN
COMMUNITY

## 2024-04-03 RX ORDER — LOSARTAN POTASSIUM AND HYDROCHLOROTHIAZIDE 12.5; 5 MG/1; MG/1
1 TABLET ORAL DAILY
Status: DISCONTINUED | OUTPATIENT
Start: 2024-04-04 | End: 2024-04-03

## 2024-04-03 RX ORDER — SODIUM CHLORIDE 9 MG/ML
INJECTION, SOLUTION INTRAVENOUS CONTINUOUS
Status: DISCONTINUED | OUTPATIENT
Start: 2024-04-03 | End: 2024-04-09 | Stop reason: HOSPADM

## 2024-04-03 RX ORDER — ONDANSETRON 4 MG/1
4 TABLET, ORALLY DISINTEGRATING ORAL
Status: COMPLETED | OUTPATIENT
Start: 2024-04-03 | End: 2024-04-03

## 2024-04-03 RX ORDER — ACETAMINOPHEN 10 MG/ML
1000 INJECTION, SOLUTION INTRAVENOUS ONCE
Status: COMPLETED | OUTPATIENT
Start: 2024-04-03 | End: 2024-04-03

## 2024-04-03 RX ORDER — INSULIN ASPART 100 [IU]/ML
1-10 INJECTION, SOLUTION INTRAVENOUS; SUBCUTANEOUS
Status: DISCONTINUED | OUTPATIENT
Start: 2024-04-03 | End: 2024-04-09 | Stop reason: HOSPADM

## 2024-04-03 RX ORDER — METHOCARBAMOL 750 MG/1
750 TABLET, FILM COATED ORAL EVERY 8 HOURS PRN
Status: DISCONTINUED | OUTPATIENT
Start: 2024-04-03 | End: 2024-04-09 | Stop reason: HOSPADM

## 2024-04-03 RX ORDER — LIDOCAINE HYDROCHLORIDE AND EPINEPHRINE 20; 10 MG/ML; UG/ML
INJECTION, SOLUTION INFILTRATION; PERINEURAL
Status: DISCONTINUED | OUTPATIENT
Start: 2024-04-03 | End: 2024-04-03 | Stop reason: HOSPADM

## 2024-04-03 RX ORDER — BUPROPION HYDROCHLORIDE 150 MG/1
150 TABLET, EXTENDED RELEASE ORAL EVERY MORNING
Status: DISCONTINUED | OUTPATIENT
Start: 2024-04-04 | End: 2024-04-09 | Stop reason: HOSPADM

## 2024-04-03 RX ORDER — MUPIROCIN 20 MG/G
OINTMENT TOPICAL 2 TIMES DAILY
Status: DISPENSED | OUTPATIENT
Start: 2024-04-03 | End: 2024-04-05

## 2024-04-03 RX ORDER — TALC
6 POWDER (GRAM) TOPICAL NIGHTLY PRN
Status: DISCONTINUED | OUTPATIENT
Start: 2024-04-03 | End: 2024-04-09 | Stop reason: HOSPADM

## 2024-04-03 RX ORDER — HYDROCODONE BITARTRATE AND ACETAMINOPHEN 5; 325 MG/1; MG/1
1 TABLET ORAL EVERY 4 HOURS PRN
Status: DISCONTINUED | OUTPATIENT
Start: 2024-04-03 | End: 2024-04-07

## 2024-04-03 RX ORDER — ACETAMINOPHEN 500 MG
1000 TABLET ORAL
Status: COMPLETED | OUTPATIENT
Start: 2024-04-03 | End: 2024-04-03

## 2024-04-03 RX ORDER — AMOXICILLIN 250 MG
2 CAPSULE ORAL 2 TIMES DAILY
Status: DISCONTINUED | OUTPATIENT
Start: 2024-04-03 | End: 2024-04-03

## 2024-04-03 RX ORDER — HYDROCODONE BITARTRATE AND ACETAMINOPHEN 7.5; 325 MG/1; MG/1
1 TABLET ORAL EVERY 4 HOURS PRN
Status: DISCONTINUED | OUTPATIENT
Start: 2024-04-03 | End: 2024-04-03

## 2024-04-03 RX ORDER — MONTELUKAST SODIUM 5 MG/1
10 TABLET, CHEWABLE ORAL NIGHTLY
Status: DISCONTINUED | OUTPATIENT
Start: 2024-04-03 | End: 2024-04-09 | Stop reason: HOSPADM

## 2024-04-03 RX ORDER — DOXEPIN HYDROCHLORIDE 10 MG/1
10 CAPSULE ORAL NIGHTLY
Status: DISCONTINUED | OUTPATIENT
Start: 2024-04-03 | End: 2024-04-09 | Stop reason: HOSPADM

## 2024-04-03 RX ORDER — MIDAZOLAM HYDROCHLORIDE 1 MG/ML
2 INJECTION INTRAMUSCULAR; INTRAVENOUS EVERY 5 MIN PRN
Status: DISCONTINUED | OUTPATIENT
Start: 2024-04-03 | End: 2024-04-03

## 2024-04-03 RX ORDER — GLYCOPYRROLATE 0.2 MG/ML
INJECTION INTRAMUSCULAR; INTRAVENOUS
Status: DISCONTINUED | OUTPATIENT
Start: 2024-04-03 | End: 2024-04-03

## 2024-04-03 RX ORDER — LOSARTAN POTASSIUM 50 MG/1
50 TABLET ORAL DAILY
Status: DISCONTINUED | OUTPATIENT
Start: 2024-04-04 | End: 2024-04-09 | Stop reason: HOSPADM

## 2024-04-03 RX ORDER — BACLOFEN 10 MG/1
10 TABLET ORAL 3 TIMES DAILY PRN
Status: ON HOLD | COMMUNITY
End: 2024-04-09 | Stop reason: HOSPADM

## 2024-04-03 RX ORDER — GABAPENTIN 300 MG/1
300 CAPSULE ORAL NIGHTLY
Status: DISCONTINUED | OUTPATIENT
Start: 2024-04-03 | End: 2024-04-03

## 2024-04-03 RX ORDER — ASPIRIN 81 MG/1
81 TABLET ORAL 2 TIMES DAILY
Status: DISCONTINUED | OUTPATIENT
Start: 2024-04-04 | End: 2024-04-09 | Stop reason: HOSPADM

## 2024-04-03 RX ORDER — ROPIVACAINE HYDROCHLORIDE 5 MG/ML
INJECTION, SOLUTION EPIDURAL; INFILTRATION; PERINEURAL
Status: COMPLETED | OUTPATIENT
Start: 2024-04-03 | End: 2024-04-03

## 2024-04-03 RX ORDER — MIDAZOLAM HYDROCHLORIDE 2 MG/2ML
INJECTION, SOLUTION INTRAMUSCULAR; INTRAVENOUS
Status: COMPLETED
Start: 2024-04-03 | End: 2024-04-03

## 2024-04-03 RX ORDER — MECLIZINE HCL 12.5 MG 12.5 MG/1
25 TABLET ORAL 3 TIMES DAILY PRN
Status: DISCONTINUED | OUTPATIENT
Start: 2024-04-03 | End: 2024-04-09 | Stop reason: HOSPADM

## 2024-04-03 RX ORDER — GLUCAGON 1 MG
1 KIT INJECTION
Status: DISCONTINUED | OUTPATIENT
Start: 2024-04-03 | End: 2024-04-09 | Stop reason: HOSPADM

## 2024-04-03 RX ORDER — HYDROCHLOROTHIAZIDE 12.5 MG/1
12.5 TABLET ORAL DAILY
Status: DISCONTINUED | OUTPATIENT
Start: 2024-04-04 | End: 2024-04-09 | Stop reason: HOSPADM

## 2024-04-03 RX ORDER — DULOXETIN HYDROCHLORIDE 30 MG/1
60 CAPSULE, DELAYED RELEASE ORAL NIGHTLY
Status: DISCONTINUED | OUTPATIENT
Start: 2024-04-03 | End: 2024-04-09 | Stop reason: HOSPADM

## 2024-04-03 RX ORDER — MORPHINE SULFATE 4 MG/ML
4 INJECTION, SOLUTION INTRAMUSCULAR; INTRAVENOUS
Status: DISCONTINUED | OUTPATIENT
Start: 2024-04-03 | End: 2024-04-04

## 2024-04-03 RX ORDER — MONTELUKAST SODIUM 10 MG/1
10 TABLET ORAL NIGHTLY
COMMUNITY

## 2024-04-03 RX ORDER — LIDOCAINE HYDROCHLORIDE 10 MG/ML
INJECTION, SOLUTION EPIDURAL; INFILTRATION; INTRACAUDAL; PERINEURAL
Status: DISCONTINUED | OUTPATIENT
Start: 2024-04-03 | End: 2024-04-03

## 2024-04-03 RX ORDER — BACLOFEN 10 MG/1
10 TABLET ORAL 3 TIMES DAILY
Status: DISCONTINUED | OUTPATIENT
Start: 2024-04-03 | End: 2024-04-05

## 2024-04-03 RX ORDER — BISACODYL 10 MG/1
10 SUPPOSITORY RECTAL DAILY
Status: DISPENSED | OUTPATIENT
Start: 2024-04-06 | End: 2024-04-07

## 2024-04-03 RX ORDER — LACTULOSE 10 G/15ML
20 SOLUTION ORAL EVERY 6 HOURS PRN
Status: DISCONTINUED | OUTPATIENT
Start: 2024-04-03 | End: 2024-04-09 | Stop reason: HOSPADM

## 2024-04-03 RX ORDER — DEXAMETHASONE SODIUM PHOSPHATE 4 MG/ML
INJECTION, SOLUTION INTRA-ARTICULAR; INTRALESIONAL; INTRAMUSCULAR; INTRAVENOUS; SOFT TISSUE
Status: DISCONTINUED | OUTPATIENT
Start: 2024-04-03 | End: 2024-04-03

## 2024-04-03 RX ORDER — POLYETHYLENE GLYCOL 3350 17 G/17G
17 POWDER, FOR SOLUTION ORAL NIGHTLY
Status: DISCONTINUED | OUTPATIENT
Start: 2024-04-03 | End: 2024-04-03

## 2024-04-03 RX ORDER — GABAPENTIN 300 MG/1
300 CAPSULE ORAL
Status: COMPLETED | OUTPATIENT
Start: 2024-04-03 | End: 2024-04-03

## 2024-04-03 RX ORDER — POTASSIUM CHLORIDE 600 MG/1
8 TABLET, FILM COATED, EXTENDED RELEASE ORAL ONCE
Status: ON HOLD | COMMUNITY
End: 2024-04-17 | Stop reason: HOSPADM

## 2024-04-03 RX ORDER — DIAZEPAM 5 MG/1
10 TABLET ORAL 2 TIMES DAILY PRN
Status: DISCONTINUED | OUTPATIENT
Start: 2024-04-03 | End: 2024-04-09 | Stop reason: HOSPADM

## 2024-04-03 RX ORDER — KETOROLAC TROMETHAMINE 10 MG/1
10 TABLET, FILM COATED ORAL
Status: COMPLETED | OUTPATIENT
Start: 2024-04-03 | End: 2024-04-03

## 2024-04-03 RX ORDER — ALUMINUM HYDROXIDE, MAGNESIUM HYDROXIDE, AND SIMETHICONE 1200; 120; 1200 MG/30ML; MG/30ML; MG/30ML
30 SUSPENSION ORAL EVERY 6 HOURS PRN
Status: DISCONTINUED | OUTPATIENT
Start: 2024-04-03 | End: 2024-04-09 | Stop reason: HOSPADM

## 2024-04-03 RX ORDER — LIDOCAINE HYDROCHLORIDE AND EPINEPHRINE 20; 10 MG/ML; UG/ML
INJECTION, SOLUTION INFILTRATION; PERINEURAL
Status: DISPENSED
Start: 2024-04-03 | End: 2024-04-03

## 2024-04-03 RX ORDER — PROPOFOL 10 MG/ML
VIAL (ML) INTRAVENOUS
Status: DISCONTINUED | OUTPATIENT
Start: 2024-04-03 | End: 2024-04-03

## 2024-04-03 RX ORDER — FENTANYL CITRATE 50 UG/ML
INJECTION, SOLUTION INTRAMUSCULAR; INTRAVENOUS
Status: DISCONTINUED | OUTPATIENT
Start: 2024-04-03 | End: 2024-04-03

## 2024-04-03 RX ORDER — METOCLOPRAMIDE HYDROCHLORIDE 5 MG/ML
10 INJECTION INTRAMUSCULAR; INTRAVENOUS
Status: COMPLETED | OUTPATIENT
Start: 2024-04-03 | End: 2024-04-04

## 2024-04-03 RX ORDER — CETIRIZINE HYDROCHLORIDE 10 MG/1
10 TABLET, CHEWABLE ORAL DAILY
COMMUNITY

## 2024-04-03 RX ORDER — SODIUM CHLORIDE, SODIUM GLUCONATE, SODIUM ACETATE, POTASSIUM CHLORIDE AND MAGNESIUM CHLORIDE 30; 37; 368; 526; 502 MG/100ML; MG/100ML; MG/100ML; MG/100ML; MG/100ML
INJECTION, SOLUTION INTRAVENOUS CONTINUOUS
Status: DISCONTINUED | OUTPATIENT
Start: 2024-04-03 | End: 2024-04-03

## 2024-04-03 RX ORDER — ESCITALOPRAM OXALATE 20 MG/1
20 TABLET ORAL DAILY
Status: DISCONTINUED | OUTPATIENT
Start: 2024-04-04 | End: 2024-04-09 | Stop reason: HOSPADM

## 2024-04-03 RX ORDER — ROPIVACAINE HYDROCHLORIDE 5 MG/ML
INJECTION, SOLUTION EPIDURAL; INFILTRATION; PERINEURAL
Status: COMPLETED
Start: 2024-04-03 | End: 2024-04-03

## 2024-04-03 RX ORDER — PHENYLEPHRINE HYDROCHLORIDE 10 MG/ML
INJECTION INTRAVENOUS
Status: DISCONTINUED | OUTPATIENT
Start: 2024-04-03 | End: 2024-04-03

## 2024-04-03 RX ORDER — PROPRANOLOL HYDROCHLORIDE 10 MG/1
10 TABLET ORAL 3 TIMES DAILY
Status: DISCONTINUED | OUTPATIENT
Start: 2024-04-03 | End: 2024-04-09 | Stop reason: HOSPADM

## 2024-04-03 RX ORDER — VANCOMYCIN HYDROCHLORIDE 1 G/20ML
INJECTION, POWDER, LYOPHILIZED, FOR SOLUTION INTRAVENOUS
Status: DISCONTINUED | OUTPATIENT
Start: 2024-04-03 | End: 2024-04-03 | Stop reason: HOSPADM

## 2024-04-03 RX ORDER — HYDROCODONE BITARTRATE AND ACETAMINOPHEN 7.5; 325 MG/1; MG/1
1 TABLET ORAL EVERY 4 HOURS PRN
Status: DISCONTINUED | OUTPATIENT
Start: 2024-04-03 | End: 2024-04-07

## 2024-04-03 RX ADMIN — MONTELUKAST SODIUM 10 MG: 5 TABLET, CHEWABLE ORAL at 08:04

## 2024-04-03 RX ADMIN — SUGAMMADEX 200 MG: 100 INJECTION, SOLUTION INTRAVENOUS at 12:04

## 2024-04-03 RX ADMIN — FENTANYL CITRATE 50 MCG: 50 INJECTION, SOLUTION INTRAMUSCULAR; INTRAVENOUS at 10:04

## 2024-04-03 RX ADMIN — ONDANSETRON 4 MG: 4 TABLET, ORALLY DISINTEGRATING ORAL at 08:04

## 2024-04-03 RX ADMIN — GABAPENTIN 300 MG: 300 CAPSULE ORAL at 08:04

## 2024-04-03 RX ADMIN — MIDAZOLAM HYDROCHLORIDE 2 MG: 1 INJECTION, SOLUTION INTRAMUSCULAR; INTRAVENOUS at 09:04

## 2024-04-03 RX ADMIN — CEFAZOLIN 2 G: 2 INJECTION, POWDER, FOR SOLUTION INTRAMUSCULAR; INTRAVENOUS at 10:04

## 2024-04-03 RX ADMIN — INSULIN DETEMIR 52 UNITS: 100 INJECTION, SOLUTION SUBCUTANEOUS at 04:04

## 2024-04-03 RX ADMIN — FENTANYL CITRATE 50 MCG: 50 INJECTION, SOLUTION INTRAMUSCULAR; INTRAVENOUS at 11:04

## 2024-04-03 RX ADMIN — ONDANSETRON 4 MG: 2 INJECTION INTRAMUSCULAR; INTRAVENOUS at 06:04

## 2024-04-03 RX ADMIN — SODIUM CHLORIDE, SODIUM GLUCONATE, SODIUM ACETATE, POTASSIUM CHLORIDE AND MAGNESIUM CHLORIDE: 526; 502; 368; 37; 30 INJECTION, SOLUTION INTRAVENOUS at 08:04

## 2024-04-03 RX ADMIN — PROPOFOL 140 MG: 10 INJECTION, EMULSION INTRAVENOUS at 10:04

## 2024-04-03 RX ADMIN — SODIUM CHLORIDE, SODIUM GLUCONATE, SODIUM ACETATE, POTASSIUM CHLORIDE AND MAGNESIUM CHLORIDE: 526; 502; 368; 37; 30 INJECTION, SOLUTION INTRAVENOUS at 10:04

## 2024-04-03 RX ADMIN — DOXEPIN HYDROCHLORIDE 10 MG: 10 CAPSULE ORAL at 08:04

## 2024-04-03 RX ADMIN — METOCLOPRAMIDE 10 MG: 5 INJECTION, SOLUTION INTRAMUSCULAR; INTRAVENOUS at 08:04

## 2024-04-03 RX ADMIN — ACETAMINOPHEN 1000 MG: 10 INJECTION INTRAVENOUS at 06:04

## 2024-04-03 RX ADMIN — SODIUM CHLORIDE: 9 INJECTION, SOLUTION INTRAVENOUS at 01:04

## 2024-04-03 RX ADMIN — ACETAMINOPHEN 1000 MG: 500 TABLET ORAL at 08:04

## 2024-04-03 RX ADMIN — CEFAZOLIN 2 G: 2 INJECTION, POWDER, FOR SOLUTION INTRAMUSCULAR; INTRAVENOUS at 11:04

## 2024-04-03 RX ADMIN — MUPIROCIN: 20 OINTMENT TOPICAL at 08:04

## 2024-04-03 RX ADMIN — DEXAMETHASONE SODIUM PHOSPHATE 4 MG: 4 INJECTION, SOLUTION INTRA-ARTICULAR; INTRALESIONAL; INTRAMUSCULAR; INTRAVENOUS; SOFT TISSUE at 10:04

## 2024-04-03 RX ADMIN — DULOXETINE HYDROCHLORIDE 60 MG: 30 CAPSULE, DELAYED RELEASE ORAL at 08:04

## 2024-04-03 RX ADMIN — PHENYLEPHRINE HYDROCHLORIDE 100 MCG: 10 INJECTION INTRAVENOUS at 12:04

## 2024-04-03 RX ADMIN — CEFAZOLIN 2 G: 2 INJECTION, POWDER, FOR SOLUTION INTRAMUSCULAR; INTRAVENOUS at 04:04

## 2024-04-03 RX ADMIN — ROPIVACAINE HYDROCHLORIDE 25 ML: 5 INJECTION, SOLUTION EPIDURAL; INFILTRATION; PERINEURAL at 09:04

## 2024-04-03 RX ADMIN — ROCURONIUM BROMIDE 60 MG: 10 SOLUTION INTRAVENOUS at 10:04

## 2024-04-03 RX ADMIN — TRANEXAMIC ACID 1950 MG: 650 TABLET ORAL at 08:04

## 2024-04-03 RX ADMIN — METOCLOPRAMIDE 10 MG: 5 INJECTION, SOLUTION INTRAMUSCULAR; INTRAVENOUS at 03:04

## 2024-04-03 RX ADMIN — INSULIN ASPART 2 UNITS: 100 INJECTION, SOLUTION INTRAVENOUS; SUBCUTANEOUS at 08:04

## 2024-04-03 RX ADMIN — SUGAMMADEX 88 MG: 100 INJECTION, SOLUTION INTRAVENOUS at 12:04

## 2024-04-03 RX ADMIN — BACLOFEN 10 MG: 10 TABLET ORAL at 03:04

## 2024-04-03 RX ADMIN — LIDOCAINE HYDROCHLORIDE 4 ML: 10 INJECTION, SOLUTION EPIDURAL; INFILTRATION; INTRACAUDAL; PERINEURAL at 10:04

## 2024-04-03 RX ADMIN — BACLOFEN 10 MG: 10 TABLET ORAL at 08:04

## 2024-04-03 RX ADMIN — PHENYLEPHRINE HYDROCHLORIDE 100 MCG: 10 INJECTION INTRAVENOUS at 10:04

## 2024-04-03 RX ADMIN — GLYCOPYRROLATE 0.2 MG: 0.2 INJECTION INTRAMUSCULAR; INTRAVENOUS at 10:04

## 2024-04-03 RX ADMIN — KETOROLAC TROMETHAMINE 10 MG: 10 TABLET, FILM COATED ORAL at 08:04

## 2024-04-03 NOTE — ANESTHESIA PROCEDURE NOTES
Peripheral Block    Patient location during procedure: pre-op   Block not for primary anesthetic.  Reason for block: at surgeon's request and post-op pain management   Post-op Pain Location: lft shoulder   Start time: 4/3/2024 9:50 AM  Timeout: 4/3/2024 9:48 AM   End time: 4/3/2024 9:01 AM    Staffing  Authorizing Provider: Jose Luis Galvez MD  Performing Provider: Jose Luis Galvez MD    Staffing  Performed by: Jose Luis Galvez MD  Authorized by: Jose Luis Galvez MD    Preanesthetic Checklist  Completed: patient identified, IV checked, site marked, risks and benefits discussed, surgical consent, monitors and equipment checked, pre-op evaluation and timeout performed  Peripheral Block  Patient position: supine  Prep: ChloraPrep  Patient monitoring: heart rate, cardiac monitor, continuous pulse ox, continuous capnometry and frequent blood pressure checks  Block type: supraclavicular  Laterality: left  Injection technique: single shot  Needle  Needle type: Stimuplex   Needle gauge: 22 G  Needle length: 4 in  Needle localization: anatomical landmarks, ultrasound guidance, nerve stimulator and paresthesias   -ultrasound image captured on disc.  Assessment  Injection assessment: negative aspiration, negative parasthesia and local visualized surrounding nerve  Paresthesia pain: none  Heart rate change: no  Slow fractionated injection: yes  Pain Tolerance: comfortable throughout block and no complaints  Medications:    Medications: ropivacaine (NAROPIN) injection 0.5% - Perineural   25 mL - 4/3/2024 9:51:00 AM    Additional Notes  VSS.  [facility] RN monitoring vitals throughout procedure.  Patient tolerated procedure well.   Sedation titrated. See nurses flowsheet.

## 2024-04-03 NOTE — PT/OT/SLP EVAL
Physical Therapy Evaluation    Patient Name:  Lazara Casillas   MRN:  [id number]    Recommendations:     Discharge Recommendations: Moderate Intensity Therapy (Low-Mod pending progress)   Discharge Equipment Recommendations: cane, quad (vs. hemiwalker)   Barriers to discharge:  impaired functional mobility    Assessment:     Lazara Casillas is a 73 y.o. female admitted with a medical diagnosis of Primary osteoarthritis of left shoulder.  She presents with the following impairments/functional limitations: weakness, impaired endurance, impaired functional mobility, gait instability, decreased upper extremity function, decreased lower extremity function, pain, decreased ROM, edema, impaired coordination, orthopedic precautions .    Rehab Prognosis: Fair; patient would benefit from acute skilled PT services to address these deficits and reach maximum level of function.    Recent Surgery: Procedure(s) (LRB):  ARTHROPLASTY, SHOULDER, TOTAL, REVERSE (Left) Day of Surgery    Plan:     During this hospitalization, patient to be seen daily (QD-BID) to address the identified rehab impairments via gait training, therapeutic activities, therapeutic exercises and progress toward the following goals:    Plan of Care Expires:  04/09/24    Subjective     Chief Complaint: L shoulder pain  Patient/Family Comments/goals:   Pain/Comfort:  Location - Side 1: Left  Location 1: shoulder  Pain Addressed 1: Pre-medicate for activity, Reposition, Distraction, Cessation of Activity    Patients cultural, spiritual, Shinto conflicts given the current situation:      Living Environment:  Pt lives in single story home with , 5 steps with L HR.  Prior to admission, patients level of function was mod ind.  Equipment used at home: rollator.  DME owned (not currently used): none.  Upon discharge, patient will have assistance from .    Objective:     Communicated with nurse prior to session.  Patient found ambulatory in room/villegas with   with peripheral IV  upon PT entry to room.    General Precautions: Standard, fall  Orthopedic Precautions:LUE non weight bearing   Braces: Shoulder abduction brace  Respiratory Status: Room air    Exams:  RLE ROM: WFL  RLE Strength: WFL  LLE ROM: WFL  LLE Strength: WFL    Functional Mobility:  Transfers:     Sit to Stand:  moderate assistance with quad cane  Toilet Transfer: moderate assistance with  quad cane  using  Step Transfer  Gait: Pt ambulated 60 ft w quad cane and CGA, using step through gait pattern at slow pace with difficulty maintaining LE sequencing. Pt unable to complete further gait due to back pain          Treatment & Education:  Pt edu on use of quad cane and importance of frequent mobility    Patient left up in chair with all lines intact, call button in reach, nurse notified, and  present.    GOALS:   Multidisciplinary Problems       Physical Therapy Goals          Problem: Physical Therapy    Goal Priority Disciplines Outcome Goal Variances Interventions   Physical Therapy Goal     PT, PT/OT Ongoing, Progressing     Description: Ambulate 50 ft. Using LRAD with CGA  5 Stairs: using LRAD with CGA  1 Step: using LRAD with CGA                       History:     Past Medical History:   Diagnosis Date    Anxiety disorder, unspecified     COPD (chronic obstructive pulmonary disease)     Depression     Diabetes mellitus, type 2     Hypertension     YE (obstructive sleep apnea)     Diagnosed years ago-does not sleep with c-pap       Past Surgical History:   Procedure Laterality Date    CHOLECYSTECTOMY      COLONOSCOPY      CORONARY ARTERY BYPASS GRAFT  2017    HYSTERECTOMY      ROTATOR CUFF REPAIR Right 03/27/2017       Time Tracking:     PT Received On:    PT Start Time: 1606     PT Stop Time: 1629  PT Total Time (min): 23 min     Billable Minutes: Evaluation 23 04/03/2024

## 2024-04-03 NOTE — ANESTHESIA PREPROCEDURE EVALUATION
04/03/2024  Lazara Casillas is a 73 y.o., female.    Procedure Information    Case: [id number] Date/Time: 04/03/24 1310   Procedure: ARTHROPLASTY, SHOULDER, TOTAL, REVERSE (Left)   Anesthesia type: Spinal   Diagnosis: Primary osteoarthritis of left shoulder [M19.012]   Pre-op diagnosis: Primary osteoarthritis of left shoulder [M19.012]   Location: [facility] OR  / [facility] OR   Surgeons: Watson Hennessy MD     Pre-op Assessment    I have reviewed the Patient Summary Reports.     I have reviewed the Nursing Notes. I have reviewed the NPO Status.   I have reviewed the Medications.     Review of Systems  Anesthesia Hx:  No problems with previous Anesthesia                Hematology/Oncology:  Hematology Normal   Oncology Normal                                   EENT/Dental:  EENT/Dental Normal           Cardiovascular:  Exercise tolerance: good   Hypertension                Functional Capacity good / => 4 METS                         Pulmonary:   COPD, mild     Sleep Apnea                Renal/:   Denies Chronic Renal Disease.                Hepatic/GI:  Hepatic/GI Normal                 Musculoskeletal:  Musculoskeletal Normal                Neurological:  Neurology Normal                                      Endocrine:  Diabetes         Denies Morbid Obesity / BMI > 40  Dermatological:  Skin Normal    Psych:   anxiety                 Physical Exam  General: Alert, Oriented, Well nourished and Cooperative    Airway:  Mallampati: II   Mouth Opening: Normal  TM Distance: Normal  Tongue: Normal  Neck ROM: Normal ROM    Dental:  Intact    Chest/Lungs:  Clear to auscultation, Normal Respiratory Rate    Heart:  Rate: Normal  Rhythm: Regular Rhythm       Latest Reference Range & Units Most Recent   WBC 4.50 - 11.50 x10(3)/mcL 6.50  3/12/24 12:26   RBC 4.20 - 5.40 x10(6)/mcL 4.04 (L)  3/12/24 12:26   Hemoglobin 12.0 - 16.0 g/dL  12.7  3/12/24 12:26   Hematocrit 37.0 - 47.0 % 37.7  3/12/24 12:26   MCV 80.0 - 94.0 fL 93.3  3/12/24 12:26   MCH 27.0 - 31.0 pg 31.4 (H)  3/12/24 12:26   MCHC 33.0 - 36.0 g/dL 33.7  3/12/24 12:26   RDW 11.5 - 17.0 % 12.8  3/12/24 12:26   Platelet Count 130 - 400 x10(3)/mcL 282  3/12/24 12:26   MPV 7.4 - 10.4 fL 9.4  3/12/24 12:26   Neut % % 59.9  3/12/24 12:26   LYMPH % % 24.8  3/12/24 12:26   Mono % % 12.5  3/12/24 12:26   Eos % % 1.7  3/12/24 12:26   Basophil % % 0.9  3/12/24 12:26   Immature Granulocytes % 0.2  3/12/24 12:26   Neut # 2.1 - 9.2 x10(3)/mcL 3.90  3/12/24 12:26   Lymph # 0.6 - 4.6 x10(3)/mcL 1.61  3/12/24 12:26   Mono # 0.1 - 1.3 x10(3)/mcL 0.81  3/12/24 12:26   Eos # 0 - 0.9 x10(3)/mcL 0.11  3/12/24 12:26   Baso # <=0.2 x10(3)/mcL 0.06  3/12/24 12:26   Immature Grans (Abs) 0 - 0.04 x10(3)/mcL 0.01  3/12/24 12:26   nRBC % 0.0  3/12/24 12:26   Sodium 136 - 145 mmol/L 142  3/12/24 12:26   Potassium 3.5 - 5.1 mmol/L 4.1  3/12/24 12:26   Chloride 98 - 107 mmol/L 101  3/12/24 12:26   CO2 23 - 31 mmol/L 33 (H)  3/12/24 12:26   BUN 9.8 - 20.1 mg/dL 18.2  3/12/24 12:26   Creatinine 0.55 - 1.02 mg/dL 1.08 (H)  3/12/24 12:26   eGFR mls/min/1.73/m2 54  3/12/24 12:26   Glucose 82 - 115 mg/dL 103  3/12/24 12:26   Calcium 8.4 - 10.2 mg/dL 10.2  3/12/24 12:26   ALP 40 - 150 unit/L 88  3/12/24 12:26   PROTEIN TOTAL 5.8 - 7.6 gm/dL 7.2  3/12/24 12:26   Albumin 3.4 - 4.8 g/dL 3.9  3/12/24 12:26   Albumin/Globulin Ratio 1.1 - 2.0 ratio 1.2  3/12/24 12:26   BILIRUBIN TOTAL <=1.5 mg/dL 0.4  3/12/24 12:26   AST 5 - 34 unit/L 17  3/12/24 12:26   ALT 0 - 55 unit/L 12  3/12/24 12:26   Globulin, Total 2.4 - 3.5 gm/dL 3.3  3/12/24 12:26   CPK 29 - 168 U/L 156 (E)  4/23/22 19:49   CPK MB 0.0 - 3.6 ng/ml 1.7 (E)  7/8/21 16:04   Troponin I 0.00 - 0.03 ng/mL <0.01 (E)  8/3/22 22:17   Hemoglobin A1C External <=7.0 % 5.2  3/12/24 12:26   Estimated Avg Glucose mg/dL 102.5  3/12/24 12:26   MRSA SCREEN BY PCR Not Detected  Not  Detected  3/12/24 12:26   SARS-CoV2 (COVID-19) Qualitative PCR Not Detected, Negative, Presumptive Negative, Invalid  Negative (E)  7/8/21 16:33   POC Glucose 70 - 100 mg/dL 145 (H) (E)  4/23/22 18:23   CT ABDOMEN PELVIS WITH IV CONTRAST  Rpt (E)  1/25/19 18:23   CT CERVICAL SPINE WITHOUT CONTRAST  Rpt (E)  8/3/22 21:01   CT HEAD WITHOUT CONTRAST  Rpt (E)  8/3/22 21:01   CT SHOULDER WITHOUT CONTRAST LEFT  Rpt  3/12/24 13:30   XR ABDOMEN, ACUTE 2 OR MORE VIEWS WITH CHEST  Rpt  2/5/17 18:25   XR ABDOMEN FLAT AND ERECT  Rpt (E)  2/15/17 12:04   XR ANKLE COMPLETE 3 VIEW LEFT  Rpt (E)  5/28/20 22:16   XR CHEST 1 VIEW  Rpt (E)  8/3/22 22:57   XR CHEST PA AND LATERAL  Rpt  3/12/24 12:21   XR CHEST PA LATERAL WITH LORDOTIC VIEW  Rpt  3/2/20 07:36   XR FOOT 2 VIEW RIGHT  Rpt  3/10/20 13:23   XR HIP WITH PELVIS WHEN PERFORMED, 2 OR 3 VIEWS LEFT  Rpt (E)  11/14/21 19:19   XR KNEE COMP 4 OR MORE VIEWS LEFT  Rpt (E)  1/8/20 13:04   XR SHOULDER COMPLETE 2 OR MORE VIEWS LEFT  Rpt  3/5/24 10:55   XR SHOULDER COMPLETE 2 OR MORE VIEWS RIGHT  Rpt  4/18/17 10:50   XR THORACIC SPINE AP LATERAL AND SWIMMERS  Rpt  7/21/12 16:03   XR TIBIA FIBULA 2 VIEW RIGHT  Rpt (E)  5/28/20 22:15   XR WRIST COMPLETE 3 VIEWS RIGHT  Rpt  5/18/17 10:11   XRAY PREVIOUS  Rpt  8/8/23 08:06   XR LUMBAR SPINE 2 OR 3 VIEWS  Rpt (E)  8/8/19 19:45   MAMMO DIGITAL DIAGNOSTIC BILAT WITH CHRISTINA  Rpt  6/19/12 10:44   MAMMO DIGITAL SCREENING BILAT  Rpt (E)  1/23/19 14:36   MRI BRAIN WITHOUT CONTRAST  Rpt (E)  8/4/22 01:07   MRI LUMBAR SPINE WITHOUT CONTRAST  Rpt (E)  8/23/19 13:33   MRI SHOULDER WITHOUT CONTRAST LEFT  Rpt  3/11/24 18:55   US BREAST BILATERAL COMPLETE  Rpt  7/11/12 10:28   US BREAST LEFT LIMITED  Rpt (E)  2/7/19 09:55   US LOWER EXTREMITY VEINS LEFT  Rpt (E)  1/8/20 13:02   US RETROPERITONEAL COMPLETE  Rpt  11/12/17 20:08   EKG 12-LEAD  Rpt  3/12/24 12:35   CARDIAC CATHETERIZATION  Rpt (E)  10/9/18 12:30   CT ARM (HUMERUS) WITHOUT CONTRAST RIGHT   Rpt  1/14/17 12:04   CTA ABD AORTA BILAT RUNOFF VESSELS W WO  Rpt (E)  7/13/20 09:49   QRS Duration ms 74  3/12/24 12:35   [facility] QTC Calculation ms 478  3/12/24 12:35   (L): Data is abnormally low  (H): Data is abnormally high  (E): External lab result  Rpt: View report in Results Review for more informationest Reason : M19.012,     Vent. Rate : 056 BPM     Atrial Rate : 056 BPM      P-R Int : 178 ms          QRS Dur : 074 ms       QT Int : 496 ms       P-R-T Axes : 033 009 -15 degrees      QTc Int : 478 ms     Sinus bradycardia with Premature atrial complexes   Nonspecific ST and T wave abnormality   Abnormal ECG   No previous ECGs available   Confirmed by Sukhwinder Bower MD ([id number]) on 3/13/2024 12:05:27 PM     Referred By: NOHEMY BUNDY           Confirmed By:Will     Anesthesia Plan  Type of Anesthesia, risks & benefits discussed:    Anesthesia Type: Gen ETT, Regional  Intra-op Monitoring Plan: Standard ASA Monitors  Post Op Pain Control Plan: multimodal analgesia  Induction:  IV and Inhalation  Airway Plan: Direct  Informed Consent: Informed consent signed with the Patient and all parties understand the risks and agree with anesthesia plan.  All questions answered. Patient consented to blood products? Yes  ASA Score: 3  Day of Surgery Review of History & Physical: H&P Update referred to the surgeon/provider.I have interviewed and examined the patient. I have reviewed the patient's H&P dated: There are no significant changes.     Ready For Surgery From Anesthesia Perspective.     .

## 2024-04-03 NOTE — PLAN OF CARE
Problem: Occupational Therapy  Goal: Occupational Therapy Goal  Description: Pt will perform UB dress c max assist using hemitechniques by d/c.   Pt will perform LB dress c max using hemitechniques by d/c.  Pt will perform toilet t/f using LRAD c mod assist by d/c.  Pt will perform toileting c mod assist by d/c.   Pt will perform Walk in shower t/f c max assist by d/c.   Pt will be Ind c HEP and pxns Compliance by d/c.    Outcome: Ongoing, Progressing

## 2024-04-03 NOTE — OP NOTE
04/03/2024    PRIMARY SURGEON: Watson Hennessy MD    ASSISTANT: Shirlene Dos Santos NP was imperative to the critical parts of the surgical case.  This included the surgical approach and dissection, retraction, placement of hardware and implants, and closure.    PREOPERATIVE DIAGNOSIS:  1. Left Rotator cuff arthropathy    POSTOPERATIVE DIAGNOSIS:  1. Same    PROCEDURES:  1. Left Reverse total shoulder arthroplasty    ANESTHESIA:  General with regional nerve block    BLOOD LOSS:  50 ml    DVT PROPHYLAXIS:  Aspirin twice daily for 2 weeks    INSTRUMENTATION:  Tornier Reverse shoulder system, size 25+3 baseplate, size 30 central screw, size 36 glenosphere, size 4 press-fit humeral stem, size 36+6 humeral cup  Implant Name Type Inv. Item Serial No.  Lot No. LRB No. Used Action   PIN GUIDE AEQUALIS [id number] - [id number]  PIN GUIDE AEQUALIS [id number]  [employer] [id number] Left 1 Implanted and Explanted   PIN GUIDE SIMPLICITI 3X75 - [id number]  PIN GUIDE SIMPLICITI 3X75  [employer] [id number] Left 1 Implanted and Explanted   PIN GUIDE SIMPLICITI 3X75 - [id number]  PIN GUIDE SIMPLICITI 3X75  [employer] [id number] Left 1 Implanted and Explanted   GLENOID GUIDE    DRAKE [id number] Left 1 Implanted and Explanted   BASEPLATE LATERALIZED 25MM +3 - [id number]  BASEPLATE LATERALIZED 25MM +3 [id number] [employer]  Left 1 Implanted   SPTERE GLENOID AEQUALIS 36MM - [id number]  SPTERE GLENOID AEQUALIS 36MM [id number] [employer]  Left 1 Implanted   INSERT HUMERAL SZ12.5B 6X36MM - [id number]  INSERT HUMERAL SZ12.5B 6X36MM [id number] [employer]  Left 1 Implanted   BASEPLATE HUMERAL CNTR REV TY - [id number]  BASEPLATE HUMERAL CNTR REV TY [id number] [employer]  Left 1 Implanted   HUMERAL STEM   [id number] DRAKE  Left 1 Implanted   BASEPLATE GLENOID REV 30X6.5MM - [id number]  BASEPLATE GLENOID REV 30X6.5MM  [employer]  Left 1 Implanted   LOCK SCREW      Left 1 Implanted   BASEPLATE GLENOID REV 18X5MM - [id number]  BASEPLATE GLENOID  REV 18X5MM  [employer]  Left 1 Implanted   LOCK SCREW      Left 1 Implanted   LOCK SCREW      Left 1 Implanted       PROCEDURE IN DETAIL:    Reverse Total Shoulder Arthroplasty    The patient is placed in a semi-beach chair position and bony prominences were padded and the neck was placed in a neutral position. The patient's operative extremity was prepped and draped in normal sterile fashion and a time-out was done.    An incision from the coracoid process to the lateral inferior deltoid was made. The cephalic vein was visualized and the deltopectoral interval was made taking the vein laterally.  Next I released the superior half of the pectoralis major tendon insertion on the humerus.  Then I found the biceps tendon and did a tenotomy of the biceps tendon.  Next, I tied off the anterior circumflex vessels with #1 Vicryl suture.    Next a subscapularis tenotomy was performed.  After this I was able to dislocate the humeral head and externally rotate the arm to expose the humeral head appropriately.  Next, the humeral cut is made using the humeral cutting guide.  Then I broached the humerus up to the appropriate size based on the cortical and rotational fit.  After this I placed a cap on the humeral stem and humeral cut.    Next the glenoid was exposed and prepared. Retractors were place safely around the glenoid for adequate exposure.  One glenoid retractor was placed posterior inferiorly and the other glenoid retractor was placed anteriorly.  I then used a guide to drill my central screw.  I drilled the guide with a slight tilt inferiorly to help prevent scapular notching of the construct.  The glenoid was reamed.  The central screw was then drilled and then the implant was then pressfit and screwed into glenoid. The glenosphere was then placed.     Next I turned my attention back to the humerus.  I placed a trial humeral cup.  The shoulder was then reduced.  I placed the patient through range of motion and there  was no signs of any scapular notching or impingement and there was appropriate tensioning of the coracobrachialis.  I then removed the entire trial implant.  Next I placed the humeral stem.  Then I placed the humeral cup onto the humeral stem.  I then reduced the shoulder and again the patient had appropriate range of motion with no signs of impinging or scapular notching.  And the coracobrachialis tension was appropriate.  The wound was then thoroughly irrigated.     Deltopectoral fascia was closed with a running #2 nonabsorbable suture The skin was closed with a 2-0 vicryl and a running 3-0 Monocryl.  Skin closure with Dermabond.  Lastly a sterile dressing was applied to the surgical site.
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
## 2024-04-03 NOTE — NURSING
Nurses Note -- 4 Eyes      4/3/2024   1:31 PM      Skin assessed during: Admit      [] No Altered Skin Integrity Present    []Prevention Measures Documented      [x] Yes- Altered Skin Integrity Present or Discovered   [] LDA Added if Not in Epic (Describe Wound)   [x] New Altered Skin Integrity was Present on Admit and Documented in LDA   [] Wound Image Taken    Wound Care Consulted? No    Attending Nurse:  Vidhya Toussaint RN/Staff Member:   Mary

## 2024-04-03 NOTE — PLAN OF CARE
Problem: Adult Inpatient Plan of Care  Goal: Plan of Care Review  Outcome: Ongoing, Progressing  Goal: Patient-Specific Goal (Individualized)  Outcome: Ongoing, Progressing  Goal: Absence of Hospital-Acquired Illness or Injury  Outcome: Ongoing, Progressing  Goal: Optimal Comfort and Wellbeing  Outcome: Ongoing, Progressing  Goal: Readiness for Transition of Care  Outcome: Ongoing, Progressing     Problem: Infection  Goal: Absence of Infection Signs and Symptoms  Outcome: Ongoing, Progressing     Problem: Pain Acute  Goal: Acceptable Pain Control and Functional Ability  Outcome: Ongoing, Progressing     Problem: Fall Injury Risk  Goal: Absence of Fall and Fall-Related Injury  Outcome: Ongoing, Progressing     Problem: COPD (Chronic Obstructive Pulmonary Disease) Comorbidity  Goal: Maintenance of COPD Symptom Control  Outcome: Ongoing, Progressing     Problem: Hypertension Comorbidity  Goal: Blood Pressure in Desired Range  Outcome: Ongoing, Progressing     Problem: Obstructive Sleep Apnea Risk or Actual Comorbidity Management  Goal: Unobstructed Breathing During Sleep  Outcome: Ongoing, Progressing     Problem: Adjustment to Surgery (Shoulder Arthroplasty)  Goal: Optimal Coping  Outcome: Ongoing, Progressing     Problem: Bleeding (Shoulder Arthroplasty)  Goal: Absence of Bleeding  Outcome: Ongoing, Progressing     Problem: Bowel Motility Impaired (Shoulder Arthroplasty)  Goal: Effective Bowel Elimination  Outcome: Ongoing, Progressing     Problem: Fluid and Electrolyte Imbalance (Shoulder Arthroplasty)  Goal: Fluid and Electrolyte Balance  Outcome: Ongoing, Progressing     Problem: Functional Ability Impaired (Shoulder Arthroplasty)  Goal: Optimal Functional Ability  Outcome: Ongoing, Progressing     Problem: Infection (Shoulder Arthroplasty)  Goal: Absence of Infection Signs and Symptoms  Outcome: Ongoing, Progressing     Problem: Neurovascular Compromise (Shoulder Arthroplasty)  Goal: Intact Neurovascular  Status  Outcome: Ongoing, Progressing     Problem: Ongoing Anesthesia Effects (Shoulder Arthroplasty)  Goal: Anesthesia/Sedation Recovery  Outcome: Ongoing, Progressing     Problem: Pain (Shoulder Arthroplasty)  Goal: Acceptable Pain Control  Outcome: Ongoing, Progressing     Problem: Postoperative Nausea and Vomiting (Shoulder Arthroplasty)  Goal: Nausea and Vomiting Relief  Outcome: Ongoing, Progressing     Problem: Postoperative Urinary Retention (Shoulder Arthroplasty)  Goal: Effective Urinary Elimination  Outcome: Ongoing, Progressing

## 2024-04-03 NOTE — TRANSFER OF CARE
"Anesthesia Transfer of Care Note    Patient: Lazara Casillas    Procedure(s) Performed: Procedure(s) (LRB):  ARTHROPLASTY, SHOULDER, TOTAL, REVERSE (Left)    Patient location: PACU    Anesthesia Type: general    Transport from OR: Transported from OR on room air with adequate spontaneous ventilation    Post pain: adequate analgesia    Post assessment: no apparent anesthetic complications    Post vital signs: stable    Level of consciousness: awake and sedated    Nausea/Vomiting: no nausea/vomiting    Complications: none    Transfer of care protocol was followed      Last vitals: Visit Vitals  BP (!) 144/68   Pulse (!) 58   Temp 35.8 °C (96.5 °F)   Resp (!) 21   Ht 5' 5" (1.651 m)   Wt 93.5 kg (206 lb 2.1 oz)   SpO2 (!) 92%   BMI 34.30 kg/m²     "

## 2024-04-03 NOTE — PLAN OF CARE
Problem: Physical Therapy  Goal: Physical Therapy Goal  Description: Ambulate 50 ft. Using LRAD with CGA  5 Stairs: using LRAD with CGA  1 Step: using LRAD with CGA  Outcome: Ongoing, Progressing

## 2024-04-03 NOTE — ANESTHESIA POSTPROCEDURE EVALUATION
Anesthesia Post Evaluation    Patient: Lazara Casillas    Procedure(s) Performed: Procedure(s) (LRB):  ARTHROPLASTY, SHOULDER, TOTAL, REVERSE (Left)    Final Anesthesia Type: general      Patient location during evaluation: PACU  Patient participation: Yes- Able to Participate  Level of consciousness: awake and alert and oriented  Post-procedure vital signs: reviewed and stable  Pain management: adequate  Airway patency: patent  YE mitigation strategies: Verification of full reversal of neuromuscular block  PONV status at discharge: No PONV  Anesthetic complications: no      Cardiovascular status: blood pressure returned to baseline and stable  Respiratory status: spontaneous ventilation and unassisted  Hydration status: euvolemic  Follow-up not needed.  Comments: [facility]              Vitals Value Taken Time   /82 04/03/24 1355   Temp 36.6 °C (97.8 °F) 04/03/24 1400   Pulse 65 04/03/24 1400   Resp 20 04/03/24 1414   SpO2 100 % 04/03/24 1400         Event Time   Out of Recovery 13:08:00         Pain/Prema Score: Pain Rating Prior to Med Admin: 8 (4/3/2024  8:52 AM)  Prema Score: 8 (4/3/2024 12:54 PM)

## 2024-04-04 PROBLEM — F32.A DEPRESSION: Status: ACTIVE | Noted: 2024-04-04

## 2024-04-04 PROBLEM — E11.9 DIABETES MELLITUS, TYPE 2: Status: ACTIVE | Noted: 2024-04-04

## 2024-04-04 PROBLEM — G47.33 OSA (OBSTRUCTIVE SLEEP APNEA): Status: ACTIVE | Noted: 2024-04-04

## 2024-04-04 PROBLEM — F41.9 ANXIETY DISORDER, UNSPECIFIED: Status: ACTIVE | Noted: 2024-04-04

## 2024-04-04 PROBLEM — I10 HYPERTENSION: Status: ACTIVE | Noted: 2024-04-04

## 2024-04-04 PROBLEM — J44.9 COPD (CHRONIC OBSTRUCTIVE PULMONARY DISEASE): Status: ACTIVE | Noted: 2024-04-04

## 2024-04-04 LAB
ANION GAP SERPL CALC-SCNC: 8 MEQ/L
BUN SERPL-MCNC: 20.6 MG/DL (ref 9.8–20.1)
CALCIUM SERPL-MCNC: 9 MG/DL (ref 8.4–10.2)
CHLORIDE SERPL-SCNC: 100 MMOL/L (ref 98–107)
CO2 SERPL-SCNC: 28 MMOL/L (ref 23–31)
CREAT SERPL-MCNC: 1.03 MG/DL (ref 0.55–1.02)
CREAT/UREA NIT SERPL: 20
ERYTHROCYTE [DISTWIDTH] IN BLOOD BY AUTOMATED COUNT: 12.4 % (ref 11.5–17)
GFR SERPLBLD CREATININE-BSD FMLA CKD-EPI: 57 MLS/MIN/1.73/M2
GLUCOSE SERPL-MCNC: 148 MG/DL (ref 82–115)
HCT VFR BLD AUTO: 34.8 % (ref 37–47)
HGB BLD-MCNC: 11.4 G/DL (ref 12–16)
MCH RBC QN AUTO: 31.1 PG (ref 27–31)
MCHC RBC AUTO-ENTMCNC: 32.8 G/DL (ref 33–36)
MCV RBC AUTO: 95.1 FL (ref 80–94)
NRBC BLD AUTO-RTO: 0 %
PLATELET # BLD AUTO: 291 X10(3)/MCL (ref 130–400)
PMV BLD AUTO: 9.7 FL (ref 7.4–10.4)
POCT GLUCOSE: 108 MG/DL (ref 70–110)
POCT GLUCOSE: 123 MG/DL (ref 70–110)
POCT GLUCOSE: 160 MG/DL (ref 70–110)
POCT GLUCOSE: 96 MG/DL (ref 70–110)
POTASSIUM SERPL-SCNC: 4.4 MMOL/L (ref 3.5–5.1)
RBC # BLD AUTO: 3.66 X10(6)/MCL (ref 4.2–5.4)
SODIUM SERPL-SCNC: 136 MMOL/L (ref 136–145)
WBC # SPEC AUTO: 10.82 X10(3)/MCL (ref 4.5–11.5)

## 2024-04-04 PROCEDURE — 11000001 HC ACUTE MED/SURG PRIVATE ROOM

## 2024-04-04 PROCEDURE — 94761 N-INVAS EAR/PLS OXIMETRY MLT: CPT

## 2024-04-04 PROCEDURE — 97530 THERAPEUTIC ACTIVITIES: CPT

## 2024-04-04 PROCEDURE — 63600175 PHARM REV CODE 636 W HCPCS: Performed by: NURSE PRACTITIONER

## 2024-04-04 PROCEDURE — 97110 THERAPEUTIC EXERCISES: CPT

## 2024-04-04 PROCEDURE — 85027 COMPLETE CBC AUTOMATED: CPT

## 2024-04-04 PROCEDURE — 94799 UNLISTED PULMONARY SVC/PX: CPT | Mod: XB

## 2024-04-04 PROCEDURE — 63600175 PHARM REV CODE 636 W HCPCS

## 2024-04-04 PROCEDURE — 99900031 HC PATIENT EDUCATION (STAT)

## 2024-04-04 PROCEDURE — 25000003 PHARM REV CODE 250: Performed by: ORTHOPAEDIC SURGERY

## 2024-04-04 PROCEDURE — 25000003 PHARM REV CODE 250: Performed by: NURSE PRACTITIONER

## 2024-04-04 PROCEDURE — 80048 BASIC METABOLIC PNL TOTAL CA: CPT

## 2024-04-04 PROCEDURE — 25000003 PHARM REV CODE 250

## 2024-04-04 PROCEDURE — 25000242 PHARM REV CODE 250 ALT 637 W/ HCPCS: Performed by: ORTHOPAEDIC SURGERY

## 2024-04-04 PROCEDURE — 97535 SELF CARE MNGMENT TRAINING: CPT

## 2024-04-04 PROCEDURE — 97116 GAIT TRAINING THERAPY: CPT

## 2024-04-04 RX ADMIN — INSULIN ASPART 2 UNITS: 100 INJECTION, SOLUTION INTRAVENOUS; SUBCUTANEOUS at 05:04

## 2024-04-04 RX ADMIN — HYDROCHLOROTHIAZIDE 12.5 MG: 12.5 TABLET ORAL at 08:04

## 2024-04-04 RX ADMIN — LINACLOTIDE 145 MCG: 145 CAPSULE, GELATIN COATED ORAL at 03:04

## 2024-04-04 RX ADMIN — BACLOFEN 10 MG: 10 TABLET ORAL at 09:04

## 2024-04-04 RX ADMIN — ESCITALOPRAM OXALATE 20 MG: 20 TABLET, FILM COATED ORAL at 08:04

## 2024-04-04 RX ADMIN — HYDROCODONE BITARTRATE AND ACETAMINOPHEN 1 TABLET: 7.5; 325 TABLET ORAL at 05:04

## 2024-04-04 RX ADMIN — METOCLOPRAMIDE 10 MG: 5 INJECTION, SOLUTION INTRAMUSCULAR; INTRAVENOUS at 09:04

## 2024-04-04 RX ADMIN — ASPIRIN 81 MG: 81 TABLET, COATED ORAL at 08:04

## 2024-04-04 RX ADMIN — INSULIN DETEMIR 52 UNITS: 100 INJECTION, SOLUTION SUBCUTANEOUS at 09:04

## 2024-04-04 RX ADMIN — DULOXETINE HYDROCHLORIDE 60 MG: 30 CAPSULE, DELAYED RELEASE ORAL at 09:04

## 2024-04-04 RX ADMIN — HYDROCODONE BITARTRATE AND ACETAMINOPHEN 1 TABLET: 5; 325 TABLET ORAL at 08:04

## 2024-04-04 RX ADMIN — MECLIZINE 25 MG: 12.5 TABLET ORAL at 09:04

## 2024-04-04 RX ADMIN — MUPIROCIN: 20 OINTMENT TOPICAL at 10:04

## 2024-04-04 RX ADMIN — DOXEPIN HYDROCHLORIDE 10 MG: 10 CAPSULE ORAL at 09:04

## 2024-04-04 RX ADMIN — PROPRANOLOL HYDROCHLORIDE 10 MG: 10 TABLET ORAL at 09:04

## 2024-04-04 RX ADMIN — MONTELUKAST SODIUM 10 MG: 5 TABLET, CHEWABLE ORAL at 09:04

## 2024-04-04 RX ADMIN — PROPRANOLOL HYDROCHLORIDE 10 MG: 10 TABLET ORAL at 08:04

## 2024-04-04 RX ADMIN — UMECLIDINIUM BROMIDE AND VILANTEROL TRIFENATATE 1 PUFF: 62.5; 25 POWDER RESPIRATORY (INHALATION) at 09:04

## 2024-04-04 RX ADMIN — HYDROCODONE BITARTRATE AND ACETAMINOPHEN 1 TABLET: 5; 325 TABLET ORAL at 12:04

## 2024-04-04 RX ADMIN — METOCLOPRAMIDE 10 MG: 5 INJECTION, SOLUTION INTRAMUSCULAR; INTRAVENOUS at 08:04

## 2024-04-04 RX ADMIN — LOSARTAN POTASSIUM 50 MG: 50 TABLET, FILM COATED ORAL at 08:04

## 2024-04-04 RX ADMIN — MUPIROCIN: 20 OINTMENT TOPICAL at 09:04

## 2024-04-04 RX ADMIN — BACLOFEN 10 MG: 10 TABLET ORAL at 02:04

## 2024-04-04 RX ADMIN — METOCLOPRAMIDE 10 MG: 5 INJECTION, SOLUTION INTRAMUSCULAR; INTRAVENOUS at 03:04

## 2024-04-04 RX ADMIN — BUPROPION HYDROCHLORIDE 150 MG: 150 TABLET, EXTENDED RELEASE ORAL at 06:04

## 2024-04-04 RX ADMIN — FLUTICASONE FUROATE 1 PUFF: 100 POWDER RESPIRATORY (INHALATION) at 09:04

## 2024-04-04 RX ADMIN — HYDROCODONE BITARTRATE AND ACETAMINOPHEN 1 TABLET: 5; 325 TABLET ORAL at 04:04

## 2024-04-04 RX ADMIN — SENNOSIDES AND DOCUSATE SODIUM 2 TABLET: 8.6; 5 TABLET ORAL at 09:04

## 2024-04-04 RX ADMIN — BACLOFEN 10 MG: 10 TABLET ORAL at 08:04

## 2024-04-04 RX ADMIN — PROPRANOLOL HYDROCHLORIDE 10 MG: 10 TABLET ORAL at 02:04

## 2024-04-04 RX ADMIN — CEFAZOLIN 2 G: 2 INJECTION, POWDER, FOR SOLUTION INTRAMUSCULAR; INTRAVENOUS at 04:04

## 2024-04-04 RX ADMIN — ISOSORBIDE MONONITRATE 60 MG: 60 TABLET, EXTENDED RELEASE ORAL at 12:04

## 2024-04-04 RX ADMIN — HYDROCODONE BITARTRATE AND ACETAMINOPHEN 1 TABLET: 5; 325 TABLET ORAL at 01:04

## 2024-04-04 RX ADMIN — ASPIRIN 81 MG: 81 TABLET, COATED ORAL at 09:04

## 2024-04-04 RX ADMIN — METOCLOPRAMIDE 10 MG: 5 INJECTION, SOLUTION INTRAMUSCULAR; INTRAVENOUS at 02:04

## 2024-04-04 NOTE — PLAN OF CARE
Problem: Occupational Therapy  Goal: Occupational Therapy Goal  Description: Pt will perform UB dress c max assist using hemitechniques by d/c. MET  Pt will perform LB dress c max using hemitechniques by d/c. MET  Pt will perform toilet t/f using LRAD c mod assist by d/c.  Pt will perform toileting c mod assist by d/c.   Pt will perform Walk in shower t/f c max assist by d/c.   Pt will be Ind c HEP and pxns Compliance by d/c.    Outcome: Ongoing, Progressing

## 2024-04-04 NOTE — PT/OT/SLP PROGRESS
"Occupational Therapy   Treatment    Name: Lazara Casillas  MRN: [id number]  Admitting Diagnosis:  Primary osteoarthritis of left shoulder  1 Day Post-Op    Recommendations:     Discharge Recommendations: Low Intensity Therapy  Discharge Equipment Recommendations:   (hemiwalker)  Barriers to discharge:  Inaccessible home environment    Assessment:     Lazara Casillas is a 73 y.o. female with a medical diagnosis of Primary osteoarthritis of left shoulder. Performance deficits affecting function are weakness, impaired endurance, impaired self care skills, impaired functional mobility, gait instability, impaired balance, decreased coordination, decreased upper extremity function, decreased lower extremity function, decreased safety awareness, pain, decreased ROM, orthopedic precautions, impaired muscle length, impaired joint extensibility.     At time of eval,  was not honest about patient's prior level of function. Instead of ~min, patient was max assist prior to surgery.    Rehab Prognosis:  Fair; patient would benefit from acute skilled OT services to address these deficits and reach maximum level of function.       Plan:     Patient to be seen daily to address the above listed problems via self-care/home management, therapeutic activities, therapeutic exercises  Plan of Care Expires: 04/09/24  Plan of Care Reviewed with: spouse    Subjective     Chief Complaint: pain  Patient/Family Comments/goals: "My pain is bad, it's close to a 9."  Pain/Comfort:  Pain Rating 1: 8/10  Location - Side 1: Left  Location - Orientation 1: generalized  Location 1: shoulder  Pain Addressed 1: Reposition, Distraction, Pre-medicate for activity, Nurse notified    Objective:     Communicated with: EBONI prior to session.  Patient found up in chair with peripheral IV upon OT entry to room.    General Precautions: Standard, fall    Orthopedic Precautions:LUE non weight bearing  Braces: Shoulder abduction brace  Respiratory Status: Room air   "   Occupational Performance:     Functional Mobility/Transfers:  Patient completed Sit <> Stand Transfer with moderate assistance  with  quad cane   Functional Mobility: unable to tolerate at this time due to patient's severe posterior lean during static standing    Activities of Daily Living:  Upper Body Dressing: maximal assistance pt and spouse educated on hemitechnique with UBD, still required max assist with tank top and brace.  Lower Body Dressing: maximal assistance with lower body dressing, pants and brief    Treatment & Education:  Patient educated on importance of performing distal ROM HEP 3-5 times a day, 30 reps total. Pt performed elbow flexion/extension, forearm supination/pronation, wrist flexion/extension, ulnar/radial deviation, and digit flexion/extension.   Patient is currently not appropriate to safely perform Codman's pendulum swings at this time due to instability with standing balance.    Patient left up in chair with all lines intact, call button in reach, and spouse present    GOALS:   Multidisciplinary Problems       Occupational Therapy Goals          Problem: Occupational Therapy    Goal Priority Disciplines Outcome Interventions   Occupational Therapy Goal     OT, PT/OT Ongoing, Progressing    Description: Pt will perform UB dress c max assist using hemitechniques by d/c. MET  Pt will perform LB dress c max using hemitechniques by d/c. MET  Pt will perform toilet t/f using LRAD c mod assist by d/c.  Pt will perform toileting c mod assist by d/c.   Pt will perform Walk in shower t/f c max assist by d/c.   Pt will be Ind c HEP and pxns Compliance by d/c.                         Time Tracking:     OT Date of Treatment: 04/04/24  OT Start Time: 0845  OT Stop Time: 0939  OT Total Time (min): 54 min    Billable Minutes:Self Care/Home Management 30  Therapeutic Exercise 24    OT/RUSS: OT          4/4/2024

## 2024-04-04 NOTE — PROGRESS NOTES
"No acute events overnight.  Pain controlled.  Patient Sitting in chair, only complaint is dizziness.    Vital Signs  Temp: 97.5 °F (36.4 °C)  Temp Source: Oral  Pulse: 64  Heart Rate Source: Monitor  Resp: 18  SpO2: 96 %  Pulse Oximetry Type: Intermittent  Flow (L/min): 10  Oxygen Concentration (%): 80  Device (Oxygen Therapy): room air  BP: (!) 157/70  BP Location: Right forearm  BP Method: Automatic  Patient Position: Lying  Arousal Level: arouses to voice  Height and Weight  Height: 5' 5" (165.1 cm)  Height Method: Stated  Weight: 93.4 kg (206 lb)  Weight Method: Bed Scale  BSA (Calculated - sq m): 2.07 sq meters  BMI (Calculated): 34.3  Weight in (lb) to have BMI = 25: 149.9]      Intake/Output Summary (Last 24 hours) at 4/4/2024 1425  Last data filed at 4/4/2024 0700  Gross per 24 hour   Intake 600 ml   Output --   Net 600 ml        +FHL/EHL  BCR distally  Dressing c/d/i  SILT distally    Recent Lab Results         04/04/24  1125   04/04/24  0449   04/03/24 2029 04/03/24  1513        Anion Gap   8.0           BUN   20.6           BUN/CREAT RATIO   20           Calcium   9.0           Chloride   100           CO2   28           Creatinine   1.03           eGFR   57           Glucose   148           Hematocrit   34.8           Hemoglobin   11.4           MCH   31.1           MCHC   32.8           MCV   95.1           MPV   9.7           nRBC   0.0           Platelet Count   291           POCT Glucose 96     211   131       Potassium   4.4           RBC   3.66           RDW   12.4           Sodium   136           WBC   10.82                   A/P:  Status post left Total Joint Surgery List: SHOULDER  Overall patient doing well.  Therapy for mobility and ambulation.    Co-morbidities Mgt:  Diabetes Mellitus Type 2 - home meds restarted, CBGs AC&HS with insulin sliding scale, tight glycemic control    COPD/Asthma - home meds resumed, Nebs PRN, Oxygen PRN    Hypertension - Home meds restarted, BP stable at at " baseline    Sleep Apnea - CPAP in use at bedtime, Oxygen PRN, Monitor oxygen saturation    Anxiety/Depression - home meds resumed    Assessment and Plan:   Impaired ADLs - OT for sling don/doff, ADL    Dizziness, likely due to a history of equilibrium issues - restart home medications, made pain medication adjustments as needed, hydrate, supervised ambulation    Impaired Mobility  Add PT consult for gait training and assistive device.    Patient not progressing as well as expected in PT/OT. Will keep hospitalized one more night, continue with aggressive PT/OT, for gait training, ADLs and strengthening. Plan for discharge tomw if patient is deemed safe from a mobility/safety standpoint.      Will keep hospitalized at least 1 more night, adjust plan of care as needed and plan for discharge once pain is controlled, the patient is tolerating pain medications appropriately all systems are working appropriately, the patient is stable and at baseline.    DVT Prophylaxis: ASA 81mg BID, TEDS, SCDs, Early ambulation  GI Prophylaxis: Pepcid BID  Bowel Regimen: Reglan//Linzess/ Senokot S   Pain Controlled  (+ Voiding) (+ Flatus)

## 2024-04-04 NOTE — PT/OT/SLP PROGRESS
Physical Therapy Treatment    Patient Name:  Lazara Casillas   MRN:  [id number]    Recommendations:     Discharge Recommendations: Moderate Intensity Therapy (Low-Mod pending progress)  Discharge Equipment Recommendations: cane, quad (vs. hemiwalker)  Barriers to discharge:  impaired functional mobility    Assessment:     Lazara Casillas is a 73 y.o. female admitted with a medical diagnosis of Primary osteoarthritis of left shoulder.  She presents with the following impairments/functional limitations: weakness, impaired endurance, impaired functional mobility, gait instability, decreased upper extremity function, decreased lower extremity function, pain, decreased ROM, edema, impaired coordination, orthopedic precautions .    Rehab Prognosis: Fair; patient would benefit from acute skilled PT services to address these deficits and reach maximum level of function.    Recent Surgery: Procedure(s) (LRB):  ARTHROPLASTY, SHOULDER, TOTAL, REVERSE (Left) 1 Day Post-Op    Plan:     During this hospitalization, patient to be seen daily (QD-BID) to address the identified rehab impairments via gait training, therapeutic activities, therapeutic exercises and progress toward the following goals:    Plan of Care Expires:  04/09/24    Subjective     Chief Complaint: L shoulder pain  Patient/Family Comments/goals:   Pain/Comfort:  Location - Side 1: Left  Location 1: shoulder  Pain Addressed 1: Pre-medicate for activity, Reposition, Cessation of Activity, Distraction      Objective:     Communicated with nurse prior to session.  Patient found up in chair with peripheral IV upon PT entry to room.     General Precautions: Standard, fall  Orthopedic Precautions: LUE non weight bearing  Braces: Shoulder abduction brace  Respiratory Status: Room air     Functional Mobility:  Transfers:     Sit to Stand:  moderate assistance with hemiwalker; pt with increased backward lean when standing  Gait: Pt ambulated 50 ft w R hemiwalker with [place] assist  "for navigating hemiwalker and min A for balance, using step to gait pattern at slow pace. Pt shows slight improvement in steadiness while ambulating with use of hemiwalker > quad cane  Stairs:  Pt ascended/descended 4" curb step with Hemiwalker with no handrails with Minimal Assistance.         Treatment & Education:  Pt edu on safety with use of hemiwalker and importance of frequent mobility    Patient left up in chair with all lines intact, call button in reach, nurse notified, and  present..    GOALS:   Multidisciplinary Problems       Physical Therapy Goals          Problem: Physical Therapy    Goal Priority Disciplines Outcome Goal Variances Interventions   Physical Therapy Goal     PT, PT/OT Ongoing, Progressing     Description: Ambulate 50 ft. Using LRAD with CGA  5 Stairs: using LRAD with CGA  1 Step: using LRAD with CGA                       Time Tracking:     PT Received On:    PT Start Time: 1054     PT Stop Time: 1124  PT Total Time (min): 30 min     Billable Minutes: Gait Training 20 and Therapeutic Activity 10    Treatment Type: Treatment  PT/PTA: PT     Number of PTA visits since last PT visit: 0     04/04/2024  "

## 2024-04-04 NOTE — PLAN OF CARE
Problem: Physical Therapy  Goal: Physical Therapy Goal  Description: Ambulate 50 ft. Using LRAD with CGA  5 Stairs: using LRAD with CGA  1 Step: using LRAD with CGA  4/4/2024 1718 by Berenice Jason, PT  Outcome: Ongoing, Progressing

## 2024-04-04 NOTE — PT/OT/SLP PROGRESS
Physical Therapy Treatment    Patient Name:  Lazara Casillas   MRN:  [id number]    Recommendations:     Discharge Recommendations: Moderate Intensity Therapy  Discharge Equipment Recommendations: cane, quad (vs. hemiwalker)  Barriers to discharge:  impaired functional mobility, unsteady gait    Assessment:     Lazara Casillas is a 73 y.o. female admitted with a medical diagnosis of Primary osteoarthritis of left shoulder.  She presents with the following impairments/functional limitations: weakness, impaired endurance, impaired functional mobility, gait instability, decreased upper extremity function, decreased lower extremity function, pain, decreased ROM, edema, impaired coordination, orthopedic precautions .    Rehab Prognosis: Poor; patient would benefit from acute skilled PT services to address these deficits and reach maximum level of function.    Recent Surgery: Procedure(s) (LRB):  ARTHROPLASTY, SHOULDER, TOTAL, REVERSE (Left) 1 Day Post-Op    Plan:     During this hospitalization, patient to be seen daily (QD-BID) to address the identified rehab impairments via gait training, therapeutic activities, therapeutic exercises and progress toward the following goals:    Plan of Care Expires:  04/09/24    Subjective     Chief Complaint: L shoulder pain, LE pain  Patient/Family Comments/goals:   Pain/Comfort:  Location - Side 1: Left  Location 1: shoulder  Pain Addressed 1: Pre-medicate for activity, Reposition, Cessation of Activity, Distraction      Objective:     Communicated with nurse prior to session.  Patient found up in chair with peripheral IV upon PT entry to room.     General Precautions: Standard, fall  Orthopedic Precautions: LUE non weight bearing  Braces: Shoulder abduction brace  Respiratory Status: Room air     Functional Mobility:  Transfers:     Sit to Stand:  moderate assistance with hemiwalker  Gait: Pt ambulated 30 ft w R hemiwalker using [place] assistance for navigating hemiwalker, pt with anterior  lean during ambulation and required mod A to recover x2 throughout gait          Treatment & Education:  Pt edu on importance of frequent mobility and use of hemiwalker    Patient left up in chair with all lines intact, call button in reach, nurse notified, and  present..    GOALS:   Multidisciplinary Problems       Physical Therapy Goals          Problem: Physical Therapy    Goal Priority Disciplines Outcome Goal Variances Interventions   Physical Therapy Goal     PT, PT/OT Ongoing, Progressing     Description: Ambulate 50 ft. Using LRAD with CGA  5 Stairs: using LRAD with CGA  1 Step: using LRAD with CGA                       Time Tracking:     PT Received On:    PT Start Time: 1651     PT Stop Time: 1659  PT Total Time (min): 8 min     Billable Minutes: Gait Training 8    Treatment Type: Treatment  PT/PTA: PT     Number of PTA visits since last PT visit: 0     04/04/2024

## 2024-04-04 NOTE — PLAN OF CARE
04/04/24 1112   Discharge Assessment   Assessment Type Discharge Planning Assessment   Source of Information patient;family   Communicated DOMINGUEZ with patient/caregiver Yes   Reason For Admission s/p RTSR   People in Home alone   Do you expect to return to your current living situation? Yes   Do you have help at home or someone to help you manage your care at home? Yes   Who are your caregiver(s) and their phone number(s)?  - JIMMY [phone]   Prior to hospitilization cognitive status: Alert/Oriented   Current cognitive status: Alert/Oriented   Walking or Climbing Stairs Difficulty yes   Walking or Climbing Stairs ambulation difficulty, requires equipment   Dressing/Bathing Difficulty yes   Dressing/Bathing bathing difficulty, requires equipment   Equipment Currently Used at Home walker, rolling   Readmission within 30 days? No   Patient currently being followed by outpatient case management? No   Do you currently have service(s) that help you manage your care at home? No   Do you take prescription medications? Yes   Do you have prescription coverage? Yes   Do you have any problems affording any of your prescribed medications? No   Is the patient taking medications as prescribed? yes   Who is going to help you get home at discharge? -   How do you get to doctors appointments? family or friend will provide   Are you on dialysis? No   Do you take coumadin? No   Discharge Plan A Home Health;Home with family   Discharge Plan B Home with family;Home Health   DME Needed Upon Discharge  other (see comments)  (sling maintained.)   Discharge Plan discussed with: Spouse/sig other;Patient   Transition of Care Barriers None     S/p RTSR. Spk w patient and , Jimmy--  to asst w homecare. Pt has RW, therapist is recommending vinod walker due to unsteady gait.  Discuss dcp options. Pt/hsb agreed to hh x 2 wks, then transition to outpt.  Provider list given. Foc obtained.   Called/faxed referral for hh to  [facility] . Plan discharge tomorrow.   Called/faxed referral for outpt therapy to [facility] ( [facility])-- to start in 2 wks. they will contact pt with appointment date & time.  Called/faxed referral for dme to [facility].  They will deliver to hospital.   Pcp:Peter Shrestha NP  Contact # Jimmy [phone].     Patient DID NOT participate in a pre-op exercise regimen

## 2024-04-04 NOTE — PLAN OF CARE
Recvd notification from [facility] - they do not carry vinod walkers.     Referral faxed to [facility] -- they are [facility]. OOP cost $75+ tax.   Pt agreed to pay OOP cost.

## 2024-04-04 NOTE — PLAN OF CARE
Problem: Adult Inpatient Plan of Care  Goal: Plan of Care Review  Outcome: Ongoing, Progressing  Goal: Patient-Specific Goal (Individualized)  Outcome: Ongoing, Progressing  Goal: Absence of Hospital-Acquired Illness or Injury  Outcome: Ongoing, Progressing  Goal: Optimal Comfort and Wellbeing  Outcome: Ongoing, Progressing  Goal: Readiness for Transition of Care  Outcome: Ongoing, Progressing     Problem: Infection  Goal: Absence of Infection Signs and Symptoms  Outcome: Ongoing, Progressing     Problem: Pain Acute  Goal: Acceptable Pain Control and Functional Ability  Outcome: Ongoing, Progressing     Problem: Fall Injury Risk  Goal: Absence of Fall and Fall-Related Injury  Outcome: Ongoing, Progressing     Problem: COPD (Chronic Obstructive Pulmonary Disease) Comorbidity  Goal: Maintenance of COPD Symptom Control  Outcome: Ongoing, Progressing     Problem: Hypertension Comorbidity  Goal: Blood Pressure in Desired Range  Outcome: Ongoing, Progressing     Problem: Obstructive Sleep Apnea Risk or Actual Comorbidity Management  Goal: Unobstructed Breathing During Sleep  Outcome: Ongoing, Progressing     Problem: Adjustment to Surgery (Shoulder Arthroplasty)  Goal: Optimal Coping  Outcome: Ongoing, Progressing     Problem: Bleeding (Shoulder Arthroplasty)  Goal: Absence of Bleeding  Outcome: Ongoing, Progressing     Problem: Bowel Motility Impaired (Shoulder Arthroplasty)  Goal: Effective Bowel Elimination  Outcome: Ongoing, Progressing     Problem: Fluid and Electrolyte Imbalance (Shoulder Arthroplasty)  Goal: Fluid and Electrolyte Balance  Outcome: Ongoing, Progressing     Problem: Functional Ability Impaired (Shoulder Arthroplasty)  Goal: Optimal Functional Ability  Outcome: Ongoing, Progressing     Problem: Infection (Shoulder Arthroplasty)  Goal: Absence of Infection Signs and Symptoms  Outcome: Ongoing, Progressing     Problem: Neurovascular Compromise (Shoulder Arthroplasty)  Goal: Intact Neurovascular  Status  Outcome: Ongoing, Progressing     Problem: Ongoing Anesthesia Effects (Shoulder Arthroplasty)  Goal: Anesthesia/Sedation Recovery  Outcome: Ongoing, Progressing     Problem: Pain (Shoulder Arthroplasty)  Goal: Acceptable Pain Control  Outcome: Ongoing, Progressing     Problem: Postoperative Nausea and Vomiting (Shoulder Arthroplasty)  Goal: Nausea and Vomiting Relief  Outcome: Ongoing, Progressing     Problem: Postoperative Urinary Retention (Shoulder Arthroplasty)  Goal: Effective Urinary Elimination  Outcome: Ongoing, Progressing     Problem: Behavioral Health Comorbidity  Goal: Maintenance of Behavioral Health Symptom Control  Outcome: Ongoing, Progressing     Problem: Diabetes Comorbidity  Goal: Blood Glucose Level Within Targeted Range  Outcome: Ongoing, Progressing

## 2024-04-04 NOTE — PROGRESS NOTES
[facility] - Orthopaedics  Orthopedic Surgery  Progress Note:      Patient Name: Lazara Casillas  MRN: [id number]  Admission Date: 4/3/2024  Attending Provider:  Watson Hennessy MD  Hospital Length of Stay:  1 days  Postoperative Day: 1 Day Post-Op      SUBJECTIVE:  Postop day one status post left reverse total shoulder arthroplasty. Patient reports no acute issues overnight and adequate control of pain on current regimen.  Patient worked with physical therapy over the last 24 hours.  Ambulated approximately 60 ft on postop day 0 per PT notes.      OBJECTIVE:    Physical Examination:  General: Well-developed, well-nourished.  Neuro: Alert and oriented x 3.  Psych: Normal mood and affect.  Left upper extremity:  Incisions clean, dry, and intact. No signs of infection. Neurovascular intact distally. Sensation intact. Abduction sling in place.    Vitals:    04/04/24 0032 04/04/24 0427 04/04/24 0450 04/04/24 0724   BP:  (!) 144/76  (!) 140/66   BP Location:       Patient Position:       Pulse:  (!) 58  (!) 52   Resp: 18  18    Temp:  97.4 °F (36.3 °C)  97.7 °F (36.5 °C)   TempSrc:  Oral  Oral   SpO2:  96%  (!) 93%   Weight:       Height:         I/O last 3 completed shifts:  In: 1170 [P.O.:120; IV Piggyback:1050]  Out: -     Medications:  Scheduled Meds:   aspirin  81 mg Oral BID    baclofen  10 mg Oral TID    [START ON 4/6/2024] bisacodyL  10 mg Rectal Daily    buPROPion  150 mg Oral QAM    cariprazine  1.5 mg Oral Daily    doxepin  10 mg Oral QHS    DULoxetine  60 mg Oral QHS    EScitalopram oxalate  20 mg Oral Daily    umeclidinium-vilanteroL  1 puff Inhalation Daily    And    fluticasone furoate  1 puff Inhalation Daily    losartan  50 mg Oral Daily    And    hydroCHLOROthiazide  12.5 mg Oral Daily    insulin detemir U-100  52 Units Subcutaneous QHS    isosorbide mononitrate  60 mg Oral Daily    linaCLOtide  145 mcg Oral Before breakfast    metoclopramide  10 mg Intravenous Q6H    montelukast  10 mg  Oral QHS    mupirocin   Nasal BID    propranoloL  10 mg Oral TID    senna-docusate 8.6-50 mg  2 tablet Oral QHS     Continuous Infusions:   sodium chloride 0.9% 75 mL/hr at 04/03/24 1312     PRN Meds:.albuterol, aluminum-magnesium hydroxide-simethicone, dextrose 10%, dextrose 10%, diazePAM, glucagon (human recombinant), glucose, glucose, HYDROcodone-acetaminophen, HYDROcodone-acetaminophen, insulin aspart U-100, lactulose, meclizine, melatonin, methocarbamoL, morphine, ondansetron    Significant Labs: All pertinent labs within the past 24 hours have been reviewed.  Recent Lab Results  (Last 5 results in the past 72 hours)        04/04/24  0449   04/03/24  2029   04/03/24  1513   04/03/24  1251   04/03/24  1237        Anion Gap 8.0               BUN 20.6               BUN/CREAT RATIO 20               Calcium 9.0               Chloride 100               CO2 28               Creatinine 1.03               eGFR 57               Glucose 148               Hematocrit 34.8       32.6         Hemoglobin 11.4       10.8         MCH 31.1               MCHC 32.8               MCV 95.1               MPV 9.7               nRBC 0.0               Platelet Count 291               POCT Glucose   211   131     123       Potassium 4.4               RBC 3.66               RDW 12.4               Sodium 136               WBC 10.82                                      Significant Imaging:  I have reviewed all pertinent imaging results/findings.  X-Ray Shoulder 2 or More Views Left    Result Date: 4/3/2024  EXAMINATION: XR SHOULDER COMPLETE 2 OR MORE VIEWS LEFT CLINICAL HISTORY: post-op;Strain of muscle(s) and tendon(s) of the rotator cuff of left shoulder, initial encounter TECHNIQUE: Two views COMPARISON: March 5, 2024. FINDINGS: There is interim placement of left shoulder arthroplasty which is in satisfactory position and alignment.     As above. Electronically signed by: Luc Chavez Date:    04/03/2024 Time:    13:37    CT Shoulder  Without Contrast Left    Result Date: 3/13/2024  EXAMINATION: CT SHOULDER WITHOUT CONTRAST LEFT CLINICAL HISTORY: Shoulder pain, chronic, osteoarthritis suspected;  Strain of muscle(s) and tendon(s) of the rotator cuff of left shoulder, initial encounter TECHNIQUE: Low-dose noncontrast images of the left shoulder were obtained.  Coronal and sagittal reformats submitted for review. COMPARISON: MRI shoulder 03/11/2024 FINDINGS: There is no fracture, dislocation or destructive osseous lesion.  Acromioclavicular and glenohumeral degenerative arthrosis is stable.  No significant subchondral cystic change.  Muscle bulk and attenuation is relatively well preserved.  No fatty atrophy.  No discrete full-thickness rotator cuff tear.  No space-occupying mass.  There is no evidence for bursitis.  Biceps tendon is grossly intact. No axillary adenopathy.  The visualized lungs are clear.  There are old healed left-sided rib fractures.  There is aortic and coronary atherosclerosis.  Prior sternotomy.     Degenerative arthrosis of the left glenohumeral and acromioclavicular joint.  No acute fracture or dislocation. Old healed left-sided rib fractures. Electronically signed by: Jose Alfredo Zavala Date:    03/13/2024 Time:    08:30    X-Ray Chest PA And Lateral    Result Date: 3/12/2024  EXAMINATION: XR CHEST PA AND LATERAL CLINICAL HISTORY: Primary osteoarthritis, left shoulder TECHNIQUE: Two views of the chest COMPARISON: 03/02/2020 FINDINGS: No focal opacification, pleural effusion, or pneumothorax. The cardiomediastinal silhouette is within normal limits with postsurgical changes of median sternotomy. No acute osseous abnormality.     No acute cardiopulmonary process. Electronically signed by: Sukhwinder Kern Date:    03/12/2024 Time:    12:23    MRI Shoulder Without Contrast Left    Result Date: 3/12/2024  EXAMINATION: MRI SHOULDER WITHOUT CONTRAST LEFT CLINICAL HISTORY: 73-year-old woman with left shoulder pain.  Strain of muscle(s)  and tendon(s) of the rotator cuff of left shoulder, initial encounter TECHNIQUE: Axial T2 fat sat, coronal T2 fat sat, sagittal T2 fat sat, and sagittal T1 non-contrast 1.5T magnetic resonance imaging was performed through the left shoulder per routine protocol. COMPARISON: None.  Correlation is made with left shoulder radiographs of 03/05/2024. FINDINGS: There is moderate supraspinatus and infraspinatus tendinosis, most advanced at the junctional zone of the tendons.  There is mild, low-grade bursal surface fraying across the distal insertional supraspinatus and infraspinatus tendons with no significant associated tendon attenuation or discrete tendon tear.  The teres minor tendon is intact with mild distal insertional tendinosis.  There is mild subscapularis tendinosis.  There is no muscle edema or atrophy.  There is no abnormal fluid distending the subacromial/subdeltoid bursa.  The proximal long head biceps tendon is intact and normal in location and appearance.  There is a moderate volume of fluid distending the biceps tendon sheath.  The left glenohumeral demonstrates no effusion.  There is mild attenuation and fibrillation of the glenoid and humeral head articular cartilage without focal cartilage defect.  There is no subchondral degenerative marrow change or significant osteophyte formation.  However, there is a sizable osteochondral body at the superior subscapularis recess, measuring 8 mm on sagittal T2 fat sat series 3, image 20.  No additional osteochondral body is identified.  There is mild degenerative fraying along the superior glenoid labrum without discrete labral tear, labral displacement, or paralabral cyst.  The glenoid labrum is otherwise normal.  No pathology is identified along the glenohumeral ligaments.  There is moderate degenerative arthrosis and associated hypertrophy at the AC joint with mild inferior encroachment and no acute pathology at the AC joint.  There is a Bigliani type 2  acromion without os acromiale.  There is mild cortical irregularity at the middle third of the greater tuberosity consistent with the rotator cuff tendinopathy.  There is no significant marrow signal abnormality or osseous lesion.  Alignment at the shoulder is anatomic and there is no acute or chronic fracture.     Moderate supraspinatus and infraspinatus tendinosis with low-grade bursal surface fraying of both distal tendons and no significant rotator cuff tendon attenuation or discrete rotator cuff tear. Mild tendinosis involving the teres minor and subscapularis. Mild proximal long head biceps tenosynovitis suggested. Mild degenerative change at the glenohumeral joint with osteochondral body at the superior subscapularis recess. Moderate degenerative arthrosis and hypertrophy at the left AC joint with mild inferior encroachment. Electronically signed by: Guero Villar Date:    03/12/2024 Time:    08:20    X-Ray Shoulder 2 or More Views Left    Result Date: 3/5/2024  See Provider Notes for results. IMPRESSION: Please see provider office/clinic notes for radiology interpretation This procedure was auto-finalized by: Virtual Radiologist    X-Ray Shoulder 2 or More Views Left  Narrative: EXAMINATION:  XR SHOULDER COMPLETE 2 OR MORE VIEWS LEFT    CLINICAL HISTORY:  post-op;Strain of muscle(s) and tendon(s) of the rotator cuff of left shoulder, initial encounter    TECHNIQUE:  Two views    COMPARISON:  March 5, 2024.    FINDINGS:  There is interim placement of left shoulder arthroplasty which is in satisfactory position and alignment.  Impression: As above.    Electronically signed by: Luc Chavez  Date:    04/03/2024  Time:    13:37         ASSESSMENT/PLAN: 73 y.o. female 1 Day Post-Op s/p left reverse total shoulder arthroplasty  -Expected ABLA with H&H 11.4/34.8 on AM labs.  -Continue with current pain control regimen  -Continue with current physical therapy and none weightbearing to left upper extremity  -Continue  with DVT prophylaxis  -Disposition pending    The above findings, diagnostics, and treatment plan were discussed with Watson Hennessy MD who personally examined the patient and is in agreement with the plan of care except as stated in additional documentation.    CLAUDIA Bal-YEVGENIY  Orthopedic Surgery  [facility] - Orthopaedics    This note was created with the assistance of voice recognition software or phone dictation.  There may be transcription errors as a result of using this technology however minimal. Effort has been made to assure accuracy of transcription but any obvious errors or omissions should be clarified with the author of the document.

## 2024-04-04 NOTE — PT/OT/SLP PROGRESS
Occupational Therapy   Treatment    Name: Lazara Casillas  MRN: [id number]  Admitting Diagnosis:  Primary osteoarthritis of left shoulder  1 Day Post-Op    Recommendations:     Discharge Recommendations: Low Intensity Therapy  Discharge Equipment Recommendations:   (hemiwalker)  Barriers to discharge:  Inaccessible home environment    Assessment:     Lazara Casillas is a 73 y.o. female with a medical diagnosis of Primary osteoarthritis of left shoulder. Performance deficits affecting function are weakness, impaired endurance, impaired self care skills, gait instability, impaired balance, impaired functional mobility, impaired cognition, decreased upper extremity function, decreased lower extremity function, impaired coordination, impaired fine motor, decreased ROM, pain, decreased safety awareness, orthopedic precautions, impaired muscle length, impaired joint extensibility.     Rehab Prognosis:  Fair; patient would benefit from acute skilled OT services to address these deficits and reach maximum level of function.       Plan:     Patient to be seen daily to address the above listed problems via self-care/home management, therapeutic activities, therapeutic exercises  Plan of Care Expires: 04/09/24  Plan of Care Reviewed with: patient, spouse    Subjective     Chief Complaint: pain in L shoulder  Patient/Family Comments/goals: to go home  Pain/Comfort:  Pain Rating 1: 9/10  Location - Side 1: Left  Location - Orientation 1: generalized  Location 1: shoulder  Pain Addressed 1: Reposition, Distraction, Nurse notified    Objective:     Patient found up in chair with peripheral IV upon OT entry to room.    General Precautions: Standard, fall    Orthopedic Precautions:LUE non weight bearing  Braces: Shoulder abduction brace  Respiratory Status: Room air     Occupational Performance:     Functional Mobility/Transfers:  Patient completed Toilet Transfer Step Transfer technique with maximal assistance with   hemiwalker  Functional Mobility: Patient only able to perform in-room FM at this time due to instability with AD. Patient reporting she is having a hard time picking up her legs.    Activities of Daily Living:  Toileting: total assistance with clothing management for toileting, as well as hygiene    Treatment & Education:  Reviewed patient's distal AROM HEP with patient and spouse. Patient performed 30 reps of the following exercises: elbow flexion and extension, forearm supination and pronation, wrist flexion and extension, ulnar and radial deviation, and digit flexion and extension. Mod verbal cues required for encouraging patient ro perform full elbow extension, as she was stopping her extension at ~20 deg. Reviewed technique for donning and doffing shoulder abduction brace for spouse, as he will be the one performing this at home.    Patient left  on toilet  with all lines intact, call button in reach, CNA notified, and spouse present    GOALS:   Multidisciplinary Problems       Occupational Therapy Goals          Problem: Occupational Therapy    Goal Priority Disciplines Outcome Interventions   Occupational Therapy Goal     OT, PT/OT Ongoing, Progressing    Description: Pt will perform UB dress c max assist using hemitechniques by d/c. MET  Pt will perform LB dress c max using hemitechniques by d/c. MET  Pt will perform toilet t/f using LRAD c mod assist by d/c.  Pt will perform toileting c mod assist by d/c.   Pt will perform Walk in shower t/f c max assist by d/c.   Pt will be Ind c HEP and pxns Compliance by d/c.                         Time Tracking:     OT Date of Treatment: 04/04/24  OT Start Time: 1308  OT Stop Time: 1333  OT Total Time (min): 25 min    Billable Minutes:Self Care/Home Management 8  Therapeutic Exercise 17    OT/RUSS: OT          4/4/2024

## 2024-04-05 LAB
ANION GAP SERPL CALC-SCNC: 8 MEQ/L
BUN SERPL-MCNC: 16.7 MG/DL (ref 9.8–20.1)
CALCIUM SERPL-MCNC: 8.7 MG/DL (ref 8.4–10.2)
CHLORIDE SERPL-SCNC: 100 MMOL/L (ref 98–107)
CO2 SERPL-SCNC: 29 MMOL/L (ref 23–31)
CREAT SERPL-MCNC: 0.88 MG/DL (ref 0.55–1.02)
CREAT/UREA NIT SERPL: 19
ERYTHROCYTE [DISTWIDTH] IN BLOOD BY AUTOMATED COUNT: 12.8 % (ref 11.5–17)
GFR SERPLBLD CREATININE-BSD FMLA CKD-EPI: >60 MLS/MIN/1.73/M2
GLUCOSE SERPL-MCNC: 102 MG/DL (ref 82–115)
HCT VFR BLD AUTO: 33.3 % (ref 37–47)
HGB BLD-MCNC: 10.8 G/DL (ref 12–16)
MCH RBC QN AUTO: 30.9 PG (ref 27–31)
MCHC RBC AUTO-ENTMCNC: 32.4 G/DL (ref 33–36)
MCV RBC AUTO: 95.4 FL (ref 80–94)
NRBC BLD AUTO-RTO: 0 %
PLATELET # BLD AUTO: 271 X10(3)/MCL (ref 130–400)
PMV BLD AUTO: 9.6 FL (ref 7.4–10.4)
POCT GLUCOSE: 100 MG/DL (ref 70–110)
POCT GLUCOSE: 146 MG/DL (ref 70–110)
POTASSIUM SERPL-SCNC: 3.8 MMOL/L (ref 3.5–5.1)
RBC # BLD AUTO: 3.49 X10(6)/MCL (ref 4.2–5.4)
SODIUM SERPL-SCNC: 137 MMOL/L (ref 136–145)
WBC # SPEC AUTO: 6.94 X10(3)/MCL (ref 4.5–11.5)

## 2024-04-05 PROCEDURE — 97116 GAIT TRAINING THERAPY: CPT

## 2024-04-05 PROCEDURE — 97110 THERAPEUTIC EXERCISES: CPT

## 2024-04-05 PROCEDURE — 80048 BASIC METABOLIC PNL TOTAL CA: CPT

## 2024-04-05 PROCEDURE — 97535 SELF CARE MNGMENT TRAINING: CPT

## 2024-04-05 PROCEDURE — 25000003 PHARM REV CODE 250: Performed by: NURSE PRACTITIONER

## 2024-04-05 PROCEDURE — 99900031 HC PATIENT EDUCATION (STAT)

## 2024-04-05 PROCEDURE — 11000001 HC ACUTE MED/SURG PRIVATE ROOM

## 2024-04-05 PROCEDURE — 94761 N-INVAS EAR/PLS OXIMETRY MLT: CPT

## 2024-04-05 PROCEDURE — 85027 COMPLETE CBC AUTOMATED: CPT

## 2024-04-05 PROCEDURE — 25000003 PHARM REV CODE 250: Performed by: ORTHOPAEDIC SURGERY

## 2024-04-05 PROCEDURE — 94799 UNLISTED PULMONARY SVC/PX: CPT | Mod: XB

## 2024-04-05 PROCEDURE — 25000003 PHARM REV CODE 250

## 2024-04-05 RX ORDER — HYDROCODONE BITARTRATE AND ACETAMINOPHEN 5; 325 MG/1; MG/1
1 TABLET ORAL EVERY 4 HOURS PRN
Qty: 42 TABLET | Refills: 0 | Status: SHIPPED | OUTPATIENT
Start: 2024-04-05 | End: 2024-04-09 | Stop reason: HOSPADM

## 2024-04-05 RX ORDER — NAPROXEN SODIUM 220 MG/1
81 TABLET, FILM COATED ORAL 2 TIMES DAILY
Qty: 84 TABLET | Refills: 0 | Status: SHIPPED | OUTPATIENT
Start: 2024-04-05 | End: 2024-04-09 | Stop reason: HOSPADM

## 2024-04-05 RX ORDER — BISACODYL 10 MG/1
20 SUPPOSITORY RECTAL ONCE
Status: COMPLETED | OUTPATIENT
Start: 2024-04-05 | End: 2024-04-05

## 2024-04-05 RX ORDER — CEFADROXIL 500 MG/1
500 CAPSULE ORAL EVERY 12 HOURS
Qty: 14 CAPSULE | Refills: 0 | Status: SHIPPED | OUTPATIENT
Start: 2024-04-05 | End: 2024-04-09 | Stop reason: HOSPADM

## 2024-04-05 RX ORDER — BACLOFEN 10 MG/1
10 TABLET ORAL 3 TIMES DAILY PRN
Status: DISCONTINUED | OUTPATIENT
Start: 2024-04-05 | End: 2024-04-09 | Stop reason: HOSPADM

## 2024-04-05 RX ORDER — POLYETHYLENE GLYCOL 3350 17 G/17G
17 POWDER, FOR SOLUTION ORAL 2 TIMES DAILY
Qty: 28 EACH | Refills: 0 | Status: SHIPPED | OUTPATIENT
Start: 2024-04-05 | End: 2024-04-09 | Stop reason: HOSPADM

## 2024-04-05 RX ORDER — SYRING-NEEDL,DISP,INSUL,0.3 ML 29 G X1/2"
296 SYRINGE, EMPTY DISPOSABLE MISCELLANEOUS ONCE
Status: COMPLETED | OUTPATIENT
Start: 2024-04-05 | End: 2024-04-05

## 2024-04-05 RX ADMIN — HYDROCODONE BITARTRATE AND ACETAMINOPHEN 1 TABLET: 5; 325 TABLET ORAL at 06:04

## 2024-04-05 RX ADMIN — MUPIROCIN: 20 OINTMENT TOPICAL at 03:04

## 2024-04-05 RX ADMIN — PROPRANOLOL HYDROCHLORIDE 10 MG: 10 TABLET ORAL at 03:04

## 2024-04-05 RX ADMIN — BISACODYL 20 MG: 10 SUPPOSITORY RECTAL at 02:04

## 2024-04-05 RX ADMIN — ASPIRIN 81 MG: 81 TABLET, COATED ORAL at 08:04

## 2024-04-05 RX ADMIN — HYDROCODONE BITARTRATE AND ACETAMINOPHEN 1 TABLET: 5; 325 TABLET ORAL at 01:04

## 2024-04-05 RX ADMIN — PROPRANOLOL HYDROCHLORIDE 10 MG: 10 TABLET ORAL at 10:04

## 2024-04-05 RX ADMIN — LINACLOTIDE 145 MCG: 145 CAPSULE, GELATIN COATED ORAL at 06:04

## 2024-04-05 RX ADMIN — DOXEPIN HYDROCHLORIDE 10 MG: 10 CAPSULE ORAL at 08:04

## 2024-04-05 RX ADMIN — ESCITALOPRAM OXALATE 20 MG: 20 TABLET, FILM COATED ORAL at 10:04

## 2024-04-05 RX ADMIN — BUPROPION HYDROCHLORIDE 150 MG: 150 TABLET, EXTENDED RELEASE ORAL at 06:04

## 2024-04-05 RX ADMIN — HYDROCODONE BITARTRATE AND ACETAMINOPHEN 1 TABLET: 5; 325 TABLET ORAL at 11:04

## 2024-04-05 RX ADMIN — DULOXETINE HYDROCHLORIDE 60 MG: 30 CAPSULE, DELAYED RELEASE ORAL at 08:04

## 2024-04-05 RX ADMIN — MAGNESIUM CITRATE 296 ML: 1.75 LIQUID ORAL at 02:04

## 2024-04-05 RX ADMIN — MONTELUKAST SODIUM 10 MG: 5 TABLET, CHEWABLE ORAL at 08:04

## 2024-04-05 RX ADMIN — ASPIRIN 81 MG: 81 TABLET, COATED ORAL at 10:04

## 2024-04-05 RX ADMIN — PROPRANOLOL HYDROCHLORIDE 10 MG: 10 TABLET ORAL at 08:04

## 2024-04-05 NOTE — PROGRESS NOTES
[facility]  Orthopedic Surgery  Progress Note:      Patient Name: Lazara Casillas  MRN: [id number]  Admission Date: 4/3/2024  Attending Provider:  Watson Hennessy MD  Hospital Length of Stay:  2 days  Postoperative Day: 2 Days Post-Op      SUBJECTIVE:  Postop day two status post left reverse total shoulder arthroplasty. Patient reports no acute issues overnight and adequate control of pain on current regimen.  Patient worked with physical therapy over the last 24 hours.      OBJECTIVE:    Physical Examination:  General: Well-developed, well-nourished.  Neuro: Alert and oriented x 3.  Psych: Normal mood and affect.  Left upper extremity:  Incisions clean, dry, and intact. No signs of infection. Neurovascular intact distally. Sensation intact. Abduction sling in place.    Vitals:    04/05/24 0121 04/05/24 0429 04/05/24 0612 04/05/24 0718   BP:  (!) 150/80  123/76   Pulse:  60  60   Resp: 18 20 15    Temp:  97.6 °F (36.4 °C)  97.9 °F (36.6 °C)   TempSrc:  Axillary  Oral   SpO2:  96%  95%   Weight:       Height:         I/O last 3 completed shifts:  In: 900 [P.O.:900]  Out: -     Medications:  Scheduled Meds:   aspirin  81 mg Oral BID    baclofen  10 mg Oral TID    [START ON 4/6/2024] bisacodyL  10 mg Rectal Daily    buPROPion  150 mg Oral QAM    cariprazine  1.5 mg Oral Daily    doxepin  10 mg Oral QHS    DULoxetine  60 mg Oral QHS    EScitalopram oxalate  20 mg Oral Daily    umeclidinium-vilanteroL  1 puff Inhalation Daily    And    fluticasone furoate  1 puff Inhalation Daily    losartan  50 mg Oral Daily    And    hydroCHLOROthiazide  12.5 mg Oral Daily    insulin detemir U-100  52 Units Subcutaneous QHS    isosorbide mononitrate  60 mg Oral Daily    linaCLOtide  145 mcg Oral Before breakfast    montelukast  10 mg Oral QHS    mupirocin   Nasal BID    propranoloL  10 mg Oral TID    senna-docusate 8.6-50 mg  2 tablet Oral QHS     Continuous Infusions:   sodium chloride 0.9% 75 mL/hr at  04/03/24 1312     PRN Meds:.albuterol, aluminum-magnesium hydroxide-simethicone, dextrose 10%, dextrose 10%, diazePAM, glucagon (human recombinant), glucose, glucose, HYDROcodone-acetaminophen, HYDROcodone-acetaminophen, insulin aspart U-100, lactulose, meclizine, melatonin, methocarbamoL, ondansetron    Significant Labs: All pertinent labs within the past 24 hours have been reviewed.  Recent Lab Results  (Last 5 results in the past 72 hours)        04/05/24  0520   04/04/24  2106   04/04/24  1644   04/04/24  1125   04/04/24  0449        Anion Gap 8.0         8.0       BUN 16.7         20.6       BUN/CREAT RATIO 19         20       Calcium 8.7         9.0       Chloride 100         100       CO2 29         28       Creatinine 0.88         1.03       eGFR >60         57       Glucose 102         148       Hematocrit 33.3         34.8       Hemoglobin 10.8         11.4       MCH 30.9         31.1       MCHC 32.4         32.8       MCV 95.4         95.1       MPV 9.6         9.7       nRBC 0.0         0.0       Platelet Count 271         291       POCT Glucose   108   160   96         Potassium 3.8         4.4       RBC 3.49         3.66       RDW 12.8         12.4       Sodium 137         136       WBC 6.94         10.82                              Significant Imaging:  I have reviewed all pertinent imaging results/findings.  X-Ray Shoulder 2 or More Views Left    Result Date: 4/3/2024  EXAMINATION: XR SHOULDER COMPLETE 2 OR MORE VIEWS LEFT CLINICAL HISTORY: post-op;Strain of muscle(s) and tendon(s) of the rotator cuff of left shoulder, initial encounter TECHNIQUE: Two views COMPARISON: March 5, 2024. FINDINGS: There is interim placement of left shoulder arthroplasty which is in satisfactory position and alignment.     As above. Electronically signed by: Luc Chavez Date:    04/03/2024 Time:    13:37    CT Shoulder Without Contrast Left    Result Date: 3/13/2024  EXAMINATION: CT SHOULDER WITHOUT CONTRAST LEFT  CLINICAL HISTORY: Shoulder pain, chronic, osteoarthritis suspected;  Strain of muscle(s) and tendon(s) of the rotator cuff of left shoulder, initial encounter TECHNIQUE: Low-dose noncontrast images of the left shoulder were obtained.  Coronal and sagittal reformats submitted for review. COMPARISON: MRI shoulder 03/11/2024 FINDINGS: There is no fracture, dislocation or destructive osseous lesion.  Acromioclavicular and glenohumeral degenerative arthrosis is stable.  No significant subchondral cystic change.  Muscle bulk and attenuation is relatively well preserved.  No fatty atrophy.  No discrete full-thickness rotator cuff tear.  No space-occupying mass.  There is no evidence for bursitis.  Biceps tendon is grossly intact. No axillary adenopathy.  The visualized lungs are clear.  There are old healed left-sided rib fractures.  There is aortic and coronary atherosclerosis.  Prior sternotomy.     Degenerative arthrosis of the left glenohumeral and acromioclavicular joint.  No acute fracture or dislocation. Old healed left-sided rib fractures. Electronically signed by: Jose Alfredo Zavala Date:    03/13/2024 Time:    08:30    X-Ray Chest PA And Lateral    Result Date: 3/12/2024  EXAMINATION: XR CHEST PA AND LATERAL CLINICAL HISTORY: Primary osteoarthritis, left shoulder TECHNIQUE: Two views of the chest COMPARISON: 03/02/2020 FINDINGS: No focal opacification, pleural effusion, or pneumothorax. The cardiomediastinal silhouette is within normal limits with postsurgical changes of median sternotomy. No acute osseous abnormality.     No acute cardiopulmonary process. Electronically signed by: Sukhwinder Kern Date:    03/12/2024 Time:    12:23    MRI Shoulder Without Contrast Left    Result Date: 3/12/2024  EXAMINATION: MRI SHOULDER WITHOUT CONTRAST LEFT CLINICAL HISTORY: 73-year-old woman with left shoulder pain.  Strain of muscle(s) and tendon(s) of the rotator cuff of left shoulder, initial encounter TECHNIQUE: Axial T2 fat  sat, coronal T2 fat sat, sagittal T2 fat sat, and sagittal T1 non-contrast 1.5T magnetic resonance imaging was performed through the left shoulder per routine protocol. COMPARISON: None.  Correlation is made with left shoulder radiographs of 03/05/2024. FINDINGS: There is moderate supraspinatus and infraspinatus tendinosis, most advanced at the junctional zone of the tendons.  There is mild, low-grade bursal surface fraying across the distal insertional supraspinatus and infraspinatus tendons with no significant associated tendon attenuation or discrete tendon tear.  The teres minor tendon is intact with mild distal insertional tendinosis.  There is mild subscapularis tendinosis.  There is no muscle edema or atrophy.  There is no abnormal fluid distending the subacromial/subdeltoid bursa.  The proximal long head biceps tendon is intact and normal in location and appearance.  There is a moderate volume of fluid distending the biceps tendon sheath.  The left glenohumeral demonstrates no effusion.  There is mild attenuation and fibrillation of the glenoid and humeral head articular cartilage without focal cartilage defect.  There is no subchondral degenerative marrow change or significant osteophyte formation.  However, there is a sizable osteochondral body at the superior subscapularis recess, measuring 8 mm on sagittal T2 fat sat series 3, image 20.  No additional osteochondral body is identified.  There is mild degenerative fraying along the superior glenoid labrum without discrete labral tear, labral displacement, or paralabral cyst.  The glenoid labrum is otherwise normal.  No pathology is identified along the glenohumeral ligaments.  There is moderate degenerative arthrosis and associated hypertrophy at the AC joint with mild inferior encroachment and no acute pathology at the AC joint.  There is a Bigliani type 2 acromion without os acromiale.  There is mild cortical irregularity at the middle third of the  greater tuberosity consistent with the rotator cuff tendinopathy.  There is no significant marrow signal abnormality or osseous lesion.  Alignment at the shoulder is anatomic and there is no acute or chronic fracture.     Moderate supraspinatus and infraspinatus tendinosis with low-grade bursal surface fraying of both distal tendons and no significant rotator cuff tendon attenuation or discrete rotator cuff tear. Mild tendinosis involving the teres minor and subscapularis. Mild proximal long head biceps tenosynovitis suggested. Mild degenerative change at the glenohumeral joint with osteochondral body at the superior subscapularis recess. Moderate degenerative arthrosis and hypertrophy at the left AC joint with mild inferior encroachment. Electronically signed by: Guero Villar Date:    03/12/2024 Time:    08:20    X-Ray Shoulder 2 or More Views Left    Result Date: 3/5/2024  See Provider Notes for results. IMPRESSION: Please see provider office/clinic notes for radiology interpretation This procedure was auto-finalized by: Virtual Radiologist    X-Ray Shoulder 2 or More Views Left  Narrative: EXAMINATION:  XR SHOULDER COMPLETE 2 OR MORE VIEWS LEFT    CLINICAL HISTORY:  post-op;Strain of muscle(s) and tendon(s) of the rotator cuff of left shoulder, initial encounter    TECHNIQUE:  Two views    COMPARISON:  March 5, 2024.    FINDINGS:  There is interim placement of left shoulder arthroplasty which is in satisfactory position and alignment.  Impression: As above.    Electronically signed by: Luc Chavez  Date:    04/03/2024  Time:    13:37         ASSESSMENT/PLAN: 73 y.o. female 2 Days Post-Op s/p left reverse total shoulder arthroplasty  -Expected ABLA stable at [facility]&H 10.8/33.3 on AM labs.  -Continue with current pain control regimen  -Continue with current physical therapy and none weightbearing to left upper extremity  -Continue with DVT prophylaxis  -Possible discharge home today pending PT evaluation    Shirlene  CLAUDIA Dos Santos-YEVGENIY  Orthopedic Surgery  [facility] - [facility]    This note was created with the assistance of voice recognition software or phone dictation.  There may be transcription errors as a result of using this technology however minimal. Effort has been made to assure accuracy of transcription but any obvious errors or omissions should be clarified with the author of the document.

## 2024-04-05 NOTE — DISCHARGE INSTRUCTIONS
[facility]  [address]  [place] [address]  Phone [phone]       /      Fax [phone]  SURGEON: Dr. Hennessy    After discharge, all questions or concerns should be handled at your surgeon's office ([phone]). If it is a weekend or after hours, you will get the surgeon on call.     Discharge Medications:    PAIN MANAGEMENT: Next Dose Available   Tylenol/Acetaminophen 500mg- every 4 hours, around the clock (WHILE AWAKE) 1000   Ultram/Tramadol 50mg (Pain Med) - every 4-6 hours AS NEEDED for pain anytime   Robaxin/Methocarbamol 500mg (Muscle Relaxer) - Every 6-8 hours AS NEEDED for muscle spasms, thigh pain or additional pain control anytime   COMPLICATION PREVENTION MEDS: Next Dose DUE   Aspirin 81mg twice a day for 4 weeks post-op for blood clot prevention PM on 4/9/2024   MiraLAX 17gm - once or twice a day while on narcotics and muscle relaxers for constipation prevention PM on 4/9/2024   Senokot S/Drea-Colace 8.6/50mg - 2 tablets once or twice a day while on narcotics and muscle relaxers for constipation prevention PM on 4/9/2024   Duricef/Cefadroxil 500mg (Antibiotic) - twice a day for 7 days post-op for infection prevention PM on 4/9/2024     SHOULDER REPLACEMENT      PAIN CONTROL & PAIN MEDICATION:(Use the medication log in your discharge packet to keep track of your medications)  Pain medicine can cause nausea and vomiting, especially on an empty stomach. Ice and elevation are more useful than pain medicine for surgical pain.  If you are having too much pain or discomfort, try more ice and higher elevation.  Please Do NOT drive a vehicle or use heavy machinery while taking pain medication. Do NOT drink alcohol while taking pain medication  Decrease the use of narcotics as the pain lessens.     Tylenol/Acetaminophen 500mg every 4 hours, around the clock (WHILE AWAKE).   Take Ultram (Tramadol) 50mg (pain pill) every 4-6 hours as needed for BREAKTHROUGH  pain.    **NO MORE THAN 3000mg OF TYLENOL IN 24 HOURS**.     Robaxin/Methocarbamol 500mg (muscle relaxer)- you can take every 6-8 hours as needed for muscle spasms, thigh pain and stiffness, additional pain control or breakthrough pain medications. This medication is helpful for pain control while lessening your need for narcotics. Please reduce the use gradually as the pain and spasms lessen. DO NOT TAKE AT THE SAME TIME AS A PAIN PILL. YOU WILL BE BETTER SERVED WITH 2 HOURS BETWEEN PAIN PILL AND MUSCLE RELAXER.     **Other things that help with pain control is COMPRESSION WRAP, ICE and ELEVATION!!**    WOUND CARE DRESSING:  Keep the dressing clean and dry.  Start every other day wound care on THURSDAY. No ointments, creams, lotions or antiseptics on the incision. Do not remove the glue covering your wound.     SHOWERING:  You may shower 5 days after surgery as long as you do NOT wet the wound/dressing. It is not uncommon to have drainage a few days after surgery.  Keep the wound covered until you follow-up with your surgeon.     DIET:  Have a regular, well balanced diet, as tolerated. Add some protein and iron to your diet for wound healing purposes. Take a multivitamin with iron for 1 month post-op.    ACTIVITY:  Sleep as upright as possible using extra pillows as needed.  A recliner may also be helpful to sleep upright.  Do this for the first few days after surgery to help decrease pain and swelling.    Apply Ice packs as much as possible.   Ice packs should be applied to the shoulder for 30 minutes, 5-6 times per day, for the 1st week to help decrease pain and swelling.  After the first week, apply as needed, especially after exercises and physical therapy.    Wear the sling at all times including while sleeping.  The only time you may remove the sling is for showers and to perform home exercise regimen or therapy exercises.      PHYSICAL THERAPY:   Continue Pendulum range of motion exercises and movement of  the elbow, wrist and fingers as instructed by occupational therapy.  Start therapy as soon as possible. All exercises and activities must be within the protocol until rehab is complete.     BLOOD CLOT PREVENTION:  If you are aware that you are at high risk for blood clots, notify your physician.  In general, you should walk as much as possible, at least every 1-2 hours, after surgery to increase blood circulation throughout your body. Most patients will take Aspirin 81 mg twice a day, morning and night for the next 4 weeks. Start on the evening of 4/9/24. Stop on 5/9/24. If you were taking a baby aspirin prior to surgery, you can restart this after 1 month (5/10/24).    You need to continuing wearing your compression stocking (JOE Hose - ThromboEmbolic Disease Prevention Device) for the next 2 weeks. It is ok to remove them for hygiene and at bedtime. Walking is the best thing you can do to prevent blood clots.     CONSTIPATION PREVENTION:   Restart Linzess  Use Miralax or Senokot S/Drea-Colace and Stool softeners every day while on pain meds. If you do not begin having regular bowel movements once you get home, start taking more aggressive over the counter LAXATIVES as needed for constipation. Some examples of over the counter laxatives would be: Milk of Magnesia, Dulcolax Suppository/Tablets, Magnesium citrate, Fleet's enema...etc).   Other things that help with preventing constipation is to drink lots of water, increase fiber in your diet and increase your walking distance each day.    URINARY RETENTION:  If you start having difficulty with urinating, discontinue Pain pills and muscle relaxers as much as possible and call your primary care doctor.     FALL PREVENTION:  Wear sturdy shoes that fit well - Wearing shoes with high heels or slippery soles, or shoes that are too loose, can lead to falls. Walking around in bare feet, or only socks, can also increase your risk of falling.  Use good lighting and   throw rugs, electrical cords, furniture and clutter (anything than can cause you to trip at home.   Non-slip rug in bathroom or shower    PNEUMONIA PREVENTION:  To prevent pneumonia, you should stay out of bed as much as possible. Continue breathing exercises (Incentive spirometry) every 1-2 hours while you are on pain medication and not as mobile.     INFECTION PREVENTION:  Duricef/Cefadroxil 500mg (Antibiotic) -  take twice a day for 7 days to prevent infection  No smoking or Tobacco products  No pets in the bed or around the wound/dressing   Pre-medicate before dental/surgical procedures and address UTIs or other infectious processes in the body as soon as possible.   Use gloves and use good hand washing before and after dressing changes. Please notify YOUR SURGEON of excessive wound drainage.   Please avoid wetting the wound, if possible.   If you are diabetic, continue monitoring blood sugars. If you continue to see high numbers, call your primary care doctor. Maintain tight blood sugar control (Below 150's) as you go through your recovery.     DRIVING OR FLYING:  In general, you should NOT drive while wearing a sling.  You must NEVER drive while taking narcotic pain medication.        PROBLEMS TO REPORT:  Unusual redness, swelling, excessive,  cloudy or foul smelling drainage at the incision site.   Persistent low grade temp or a temp greater than 102 F, unrelieved by Tylenol  Pain at surgical site, unrelieved by pain meds      Warning signs of a blood clot in your leg: (CALL YOUR DOCTOR)  New Onset or Increasing pain in calf, tenderness or redness above or below the knee or increasing swelling of your calf, ankle, or foot.  Warning signs that a blood clot has traveled to your lungs: (REPORT TO THE ER)  Sudden or increase in Shortness of breath, sudden onset of chest pains, or  Localized chest pain with coughing.     RETURN APPOINTMENT:  To confirm or reschedule your appointment, call [phone].    IF YOU  NEED TO CALL YOUR PRIMARY CARE DOCTOR FOR ANY ISSUES DURING YOUR RECOVERY, PLEASE NOTIFY YOUR ORTHOPEDIC SURGEON AS WELL.   For emergencies, please report to our ([facility] or [facility] main [place]) Emergency department and tell them to call Dr. Hennessy at [phone].     FOR ANY ISSUES AT THE FACILITY, PLEASE CALL THE SURGEON FIRST.   Before making changes to the medical care plan or sending the patient to the emergency room, please call Dr. Hennessy.

## 2024-04-05 NOTE — PT/OT/SLP PROGRESS
"Occupational Therapy   Treatment    Name: Lazara Casillas  MRN: [id number]  Admitting Diagnosis:  Primary osteoarthritis of left shoulder  2 Days Post-Op    Recommendations:     Discharge Recommendations: Moderate Intensity Therapy  Discharge Equipment Recommendations:   (hemiwalker)  Barriers to discharge:  Inaccessible home environment    Assessment:     Lazara Casillas is a 73 y.o. female with a medical diagnosis of Primary osteoarthritis of left shoulder. Performance deficits affecting function are weakness, impaired endurance, impaired self care skills, impaired functional mobility, gait instability, impaired balance, impaired cognition, decreased coordination, decreased upper extremity function, decreased lower extremity function, decreased safety awareness, pain, decreased ROM, orthopedic precautions, impaired muscle length, impaired joint extensibility.     Rehab Prognosis:  Poor; patient would benefit from acute skilled OT services to address these deficits and reach maximum level of function.       Plan:     Patient to be seen daily to address the above listed problems via self-care/home management, therapeutic activities, therapeutic exercises  Plan of Care Expires: 04/09/24  Plan of Care Reviewed with: patient, spouse    Subjective     Chief Complaint: fatigue and pain  Patient/Family Comments/goals: "I have to go to the bathroom."  Pain/Comfort:  Pain Rating 1: 9/10  Location - Side 1: Left  Location - Orientation 1: generalized  Location 1: shoulder  Pain Addressed 1: Reposition, Distraction, Nurse notified    Objective:     Communicated with: NSSASHA prior to session.  Patient found  trying to get out of recliner to use restroom  with peripheral IV upon OT entry to room.    General Precautions: Standard, fall    Orthopedic Precautions:LUE non weight bearing  Braces: Shoulder abduction brace  Respiratory Status: Room air     Occupational Performance:     Functional Mobility/Transfers:  Patient completed Sit <> " Stand Transfer with maximal assistance  with  hemiwalker. During sit>stand transfer with hemiwalker, patient maintained a severe posterior lean, despite OT's max verbal cues to lean forward and use hemiwalker.   Patient completed Toilet Transfer Stand Pivot technique with maximal assistance with  bedside commode and hemiwalker  Functional Mobility: Patient unable to tolerate at this time due to fatigue and severe posterior lean at time of session.    Activities of Daily Living:  Lower Body Dressing: total assistance due to incontinent episode  Toileting: total assistance with perineal hygiene and clothing management;  performed wiping while OT provided max assist with static standing balance    Treatment & Education:  Patient required total assist with doffing and donning shoulder abduction brace. Reviewed distal AROM HEP, 30 reps, as follows: elbow flexion and extension, forearm pronation and supination, wrist flexion and extension, ulnar and radial deviation, and digit flexion and extension. Patient still requiring facilitation from OT for full range with distal joints, as well as verbal cues to open her eyes - she had difficulty staying awake during PM session.    Patient left up in chair with all lines intact, call button in reach, and RN notified    GOALS:   Multidisciplinary Problems       Occupational Therapy Goals          Problem: Occupational Therapy    Goal Priority Disciplines Outcome Interventions   Occupational Therapy Goal     OT, PT/OT Ongoing, Progressing    Description: Pt will perform UB dress c max assist using hemitechniques by d/c. MET  Pt will perform LB dress c max using hemitechniques by d/c. MET  Pt will perform toilet t/f using LRAD c mod assist by d/c.  Pt will perform toileting c mod assist by d/c.   Pt will perform Walk in shower t/f c max assist by d/c.   Pt will be Ind c HEP and pxns Compliance by d/c.                         Time Tracking:     OT Date of Treatment:  04/05/24  OT Start Time: 1410  OT Stop Time: 1450  OT Total Time (min): 40 min    Billable Minutes:Self Care/Home Management 25  Therapeutic Exercise 15    OT/RUSS: OT          4/5/2024

## 2024-04-05 NOTE — PT/OT/SLP PROGRESS
Physical Therapy Treatment    Patient Name:  Lazara Casillas   MRN:  [id number]    Recommendations:     Discharge Recommendations: Moderate Intensity Therapy  Discharge Equipment Recommendations: cane, quad (vs. hemiwalker)  Barriers to discharge:  impaired functional mobility, lethargic    Assessment:     Lazara Casillas is a 73 y.o. female admitted with a medical diagnosis of Primary osteoarthritis of left shoulder.  She presents with the following impairments/functional limitations: weakness, impaired endurance, impaired functional mobility, gait instability, decreased upper extremity function, decreased lower extremity function, pain, decreased ROM, edema, impaired coordination, orthopedic precautions .    Rehab Prognosis: Fair; patient would benefit from acute skilled PT services to address these deficits and reach maximum level of function.    Recent Surgery: Procedure(s) (LRB):  ARTHROPLASTY, SHOULDER, TOTAL, REVERSE (Left) 2 Days Post-Op    Plan:     During this hospitalization, patient to be seen daily (QD-BID) to address the identified rehab impairments via gait training, therapeutic activities, therapeutic exercises and progress toward the following goals:    Plan of Care Expires:  04/09/24    Subjective     Chief Complaint: L shoulder pain  Patient/Family Comments/goals:   Pain/Comfort:  Location - Side 1: Left  Location 1: shoulder  Pain Addressed 1: Reposition, Distraction, Pre-medicate for activity      Objective:     Communicated with nurse prior to session.  Patient found up in chair with peripheral IV upon PT entry to room.     General Precautions: Standard, fall  Orthopedic Precautions: LUE non weight bearing  Braces: Shoulder abduction brace  Respiratory Status: Room air     Functional Mobility:  Transfers:     Sit to Stand:  moderate assistance with hemiwalker  Gait: Pt ambulated 30 ft. W min A to navigate hemiwalker, unable to cont ambulation due to fatigue/pain        Treatment & Education:  Pt edu  on importance of frequent mobility    Patient left up in chair with all lines intact, call button in reach, nurse notified, and  present..    GOALS:   Multidisciplinary Problems       Physical Therapy Goals          Problem: Physical Therapy    Goal Priority Disciplines Outcome Goal Variances Interventions   Physical Therapy Goal     PT, PT/OT Ongoing, Progressing     Description: Ambulate 50 ft. Using LRAD with CGA  5 Stairs: using LRAD with CGA  1 Step: using LRAD with CGA                       Time Tracking:     PT Received On:    PT Start Time: 1017     PT Stop Time: 1036  PT Total Time (min): 19 min     Billable Minutes: Gait Training 19    Treatment Type: Treatment  PT/PTA: PT     Number of PTA visits since last PT visit: 0     04/05/2024

## 2024-04-05 NOTE — PT/OT/SLP PROGRESS
"Occupational Therapy   Treatment    Name: Lazara Casillas  MRN: [id number]  Admitting Diagnosis:  Primary osteoarthritis of left shoulder  2 Days Post-Op    Recommendations:     Discharge Recommendations: Low Intensity Therapy  Discharge Equipment Recommendations:   (hemiwalker)  Barriers to discharge:  Inaccessible home environment    Assessment:     Lazara Casillas is a 73 y.o. female with a medical diagnosis of Primary osteoarthritis of left shoulder.  Performance deficits affecting function are weakness, impaired endurance, impaired coordination, decreased ROM, pain, decreased safety awareness, decreased lower extremity function, decreased upper extremity function, decreased coordination, impaired cognition, impaired balance, impaired functional mobility, gait instability, impaired self care skills, orthopedic precautions, impaired muscle length, impaired joint extensibility.     Rehab Prognosis:  Poor; patient would benefit from acute skilled OT services to address these deficits and reach maximum level of function.       Plan:     Patient to be seen daily to address the above listed problems via self-care/home management, therapeutic activities, therapeutic exercises  Plan of Care Expires: 04/09/24  Plan of Care Reviewed with: patient, spouse    Subjective     Chief Complaint: fatigue  Patient/Family Comments/goals: "My pain is close to a 10."  Pain/Comfort:  Pain Rating 1: 9/10  Location - Side 1: Left  Location - Orientation 1: generalized  Location 1: shoulder  Pain Addressed 1: Distraction, Reposition, Nurse notified    Objective:     Communicated with: NSSASHA prior to session.  Patient found up in chair with peripheral IV upon OT entry to room.    General Precautions: Standard, fall    Orthopedic Precautions:LUE non weight bearing  Braces: Shoulder abduction brace  Respiratory Status: Room air     Occupational Performance:     Treatment & Education:  Patient required max assist for removal of shoulder abduction " brace. OT reviewed LUE distal AROM HEP (30 reps of each exercise) as follows: elbow flexion and extension, forearm pronation and supination, radial and ulnar deviation, wrist flexion and extension, and digit flexion and extension. Throughout the exercises, pt required mod facilitation from OT to achieve full range, as she was very drowsy during session.     Patient left up in chair with all lines intact, call button in reach, RN notified, and RN present    GOALS:   Multidisciplinary Problems       Occupational Therapy Goals          Problem: Occupational Therapy    Goal Priority Disciplines Outcome Interventions   Occupational Therapy Goal     OT, PT/OT Ongoing, Progressing    Description: Pt will perform UB dress c max assist using hemitechniques by d/c. MET  Pt will perform LB dress c max using hemitechniques by d/c. MET  Pt will perform toilet t/f using LRAD c mod assist by d/c.  Pt will perform toileting c mod assist by d/c.   Pt will perform Walk in shower t/f c max assist by d/c.   Pt will be Ind c HEP and pxns Compliance by d/c.                         Time Tracking:     OT Date of Treatment: 04/05/24  OT Start Time: 1049  OT Stop Time: 1111  OT Total Time (min): 22 min    Billable Minutes:Therapeutic Exercise 22    OT/RUSS: OT          4/5/2024

## 2024-04-05 NOTE — PROGRESS NOTES
This am, patient appears drowsy and overmedicated.  Reviewed medications with patient to ensure accuracy and discovered baclofen was being use sparingly at home prior to surgery although it was listed on her med rec as every 8 hours around the clock.  Informed patient that she had been getting it every 8 hours around the clock and that we would discontinue the medication.  IV fluids ordered for fluid resuscitation.  Unfortunately, Patient is still not progressing as well as expected in PT/OT. Will keep hospitalized one more night, continue with aggressive PT/OT, for gait training, ADLs and strengthening.  We will continue IV fluids, use pain medications sparingly and Plan for discharge tomw if patient is deemed safe from a mobility/safety standpoint.       She is also complaining of constipation, has had no results with Linzess x 2 doses.  Magnesium citrate and Dulcolax suppositories ordered p.r.n..  Discussed with nursing staff importance of getting bowels moving as soon as possible.      DVT Prophylaxis: ASA 81mg BID, TEDS, SCDs, Early ambulation  GI Prophylaxis: Pepcid BID  Bowel Regimen: Linzess/ Senokot S/magnesium citrate/Dulcolax suppositories  Pain Controlled  (+ Voiding) (+ Flatus)        Jeny Marie FNP-C  Orthopedic Surgery  [facility] - [facility]     This note was created with the assistance of voice recognition software or phone dictation.  There may be transcription errors as a result of using this technology however minimal. Effort has been made to assure accuracy of transcription but any obvious errors or omissions should be clarified with the author of the document.

## 2024-04-06 LAB
POCT GLUCOSE: 105 MG/DL (ref 70–110)
POCT GLUCOSE: 121 MG/DL (ref 70–110)
POCT GLUCOSE: 121 MG/DL (ref 70–110)
VIEW PATHOLOGY REPORT (RELIAPATH): NORMAL

## 2024-04-06 PROCEDURE — 97535 SELF CARE MNGMENT TRAINING: CPT

## 2024-04-06 PROCEDURE — 97116 GAIT TRAINING THERAPY: CPT | Mod: CQ

## 2024-04-06 PROCEDURE — 25000003 PHARM REV CODE 250

## 2024-04-06 PROCEDURE — 97530 THERAPEUTIC ACTIVITIES: CPT

## 2024-04-06 PROCEDURE — 97530 THERAPEUTIC ACTIVITIES: CPT | Mod: CQ

## 2024-04-06 PROCEDURE — 63600175 PHARM REV CODE 636 W HCPCS: Performed by: NURSE PRACTITIONER

## 2024-04-06 PROCEDURE — 94799 UNLISTED PULMONARY SVC/PX: CPT | Mod: XB

## 2024-04-06 PROCEDURE — 94761 N-INVAS EAR/PLS OXIMETRY MLT: CPT

## 2024-04-06 PROCEDURE — 99900031 HC PATIENT EDUCATION (STAT)

## 2024-04-06 PROCEDURE — 25000003 PHARM REV CODE 250: Performed by: NURSE PRACTITIONER

## 2024-04-06 PROCEDURE — 11000001 HC ACUTE MED/SURG PRIVATE ROOM

## 2024-04-06 PROCEDURE — 25000003 PHARM REV CODE 250: Performed by: ORTHOPAEDIC SURGERY

## 2024-04-06 RX ORDER — LOPERAMIDE HYDROCHLORIDE 2 MG/1
2 CAPSULE ORAL 4 TIMES DAILY PRN
Status: DISCONTINUED | OUTPATIENT
Start: 2024-04-06 | End: 2024-04-09 | Stop reason: HOSPADM

## 2024-04-06 RX ADMIN — PROPRANOLOL HYDROCHLORIDE 10 MG: 10 TABLET ORAL at 02:04

## 2024-04-06 RX ADMIN — LOPERAMIDE HYDROCHLORIDE 2 MG: 2 CAPSULE ORAL at 06:04

## 2024-04-06 RX ADMIN — BUPROPION HYDROCHLORIDE 150 MG: 150 TABLET, EXTENDED RELEASE ORAL at 06:04

## 2024-04-06 RX ADMIN — UMECLIDINIUM BROMIDE AND VILANTEROL TRIFENATATE 1 PUFF: 62.5; 25 POWDER RESPIRATORY (INHALATION) at 09:04

## 2024-04-06 RX ADMIN — ESCITALOPRAM OXALATE 20 MG: 20 TABLET, FILM COATED ORAL at 09:04

## 2024-04-06 RX ADMIN — ASPIRIN 81 MG: 81 TABLET, COATED ORAL at 08:04

## 2024-04-06 RX ADMIN — PROPRANOLOL HYDROCHLORIDE 10 MG: 10 TABLET ORAL at 09:04

## 2024-04-06 RX ADMIN — LOSARTAN POTASSIUM 50 MG: 50 TABLET, FILM COATED ORAL at 09:04

## 2024-04-06 RX ADMIN — DOXEPIN HYDROCHLORIDE 10 MG: 10 CAPSULE ORAL at 08:04

## 2024-04-06 RX ADMIN — INSULIN DETEMIR 52 UNITS: 100 INJECTION, SOLUTION SUBCUTANEOUS at 08:04

## 2024-04-06 RX ADMIN — HYDROCHLOROTHIAZIDE 12.5 MG: 12.5 TABLET ORAL at 09:04

## 2024-04-06 RX ADMIN — MONTELUKAST SODIUM 10 MG: 5 TABLET, CHEWABLE ORAL at 08:04

## 2024-04-06 RX ADMIN — DULOXETINE HYDROCHLORIDE 60 MG: 30 CAPSULE, DELAYED RELEASE ORAL at 08:04

## 2024-04-06 RX ADMIN — PROPRANOLOL HYDROCHLORIDE 10 MG: 10 TABLET ORAL at 08:04

## 2024-04-06 RX ADMIN — HYDROCODONE BITARTRATE AND ACETAMINOPHEN 1 TABLET: 5; 325 TABLET ORAL at 08:04

## 2024-04-06 RX ADMIN — ISOSORBIDE MONONITRATE 60 MG: 60 TABLET, EXTENDED RELEASE ORAL at 09:04

## 2024-04-06 RX ADMIN — LINACLOTIDE 145 MCG: 145 CAPSULE, GELATIN COATED ORAL at 06:04

## 2024-04-06 RX ADMIN — FLUTICASONE FUROATE 1 PUFF: 100 POWDER RESPIRATORY (INHALATION) at 09:04

## 2024-04-06 RX ADMIN — ASPIRIN 81 MG: 81 TABLET, COATED ORAL at 09:04

## 2024-04-06 RX ADMIN — HYDROCODONE BITARTRATE AND ACETAMINOPHEN 1 TABLET: 5; 325 TABLET ORAL at 11:04

## 2024-04-06 RX ADMIN — ALUMINUM HYDROXIDE, MAGNESIUM HYDROXIDE, AND DIMETHICONE 30 ML: 200; 20; 200 SUSPENSION ORAL at 02:04

## 2024-04-06 NOTE — PROGRESS NOTES
[facility] - [facility]opa[facility]ics  Orthopedic Surgery  Progress Note:      Patient Name: Lazara Casillas  MRN: [id number]  Admission Date: 4/3/2024  Attending Provider:  Watson Hennessy MD  Hospital Length of Stay:  3 days  Postoperative Day: 3 Days Post-Op      SUBJECTIVE:  Postop day two status post left reverse total shoulder arthroplasty. Patient reports no acute issues overnight and adequate control of pain on current regimen.  Patient worked with physical therapy over the last 24 hours with improvements today, walked 40ft. Although PT reports patient still continues to be unsteady and would benefit from 1 more day of inpatient PT.      OBJECTIVE:    Physical Examination:  General: Well-developed, well-nourished.  Neuro: Alert and oriented x 3.  Psych: Normal mood and affect.  Left upper extremity:  Incisions clean, dry, and intact. No signs of infection. Neurovascular intact distally. Sensation intact. Abduction sling in place.    Vitals:    04/06/24 0708 04/06/24 0941 04/06/24 1111 04/06/24 1125   BP: 131/78   118/71   Patient Position: Sitting      Pulse: 67 67  62   Resp: 18 18 16 18   Temp: 98.1 °F (36.7 °C)   98.1 °F (36.7 °C)   TempSrc: Oral   Oral   SpO2: (!) 93%   95%   Weight:       Height:         I/O last 3 completed shifts:  In: 660 [P.O.:660]  Out: -     Medications:  Scheduled Meds:   aspirin  81 mg Oral BID    bisacodyL  10 mg Rectal Daily    buPROPion  150 mg Oral QAM    cariprazine  1.5 mg Oral Daily    doxepin  10 mg Oral QHS    DULoxetine  60 mg Oral QHS    EScitalopram oxalate  20 mg Oral Daily    umeclidinium-vilanteroL  1 puff Inhalation Daily    And    fluticasone furoate  1 puff Inhalation Daily    losartan  50 mg Oral Daily    And    hydroCHLOROthiazide  12.5 mg Oral Daily    insulin detemir U-100  52 Units Subcutaneous QHS    isosorbide mononitrate  60 mg Oral Daily    linaCLOtide  145 mcg Oral Before breakfast    montelukast  10 mg Oral QHS    propranoloL  10 mg Oral TID     senna-docusate 8.6-50 mg  2 tablet Oral QHS     Continuous Infusions:   sodium chloride 0.9% 75 mL/hr at 04/03/24 1312     PRN Meds:.albuterol, aluminum-magnesium hydroxide-simethicone, baclofen, dextrose 10%, dextrose 10%, diazePAM, glucagon (human recombinant), glucose, glucose, HYDROcodone-acetaminophen, HYDROcodone-acetaminophen, insulin aspart U-100, lactulose, meclizine, melatonin, methocarbamoL, ondansetron    Significant Labs: All pertinent labs within the past 24 hours have been reviewed.  Recent Lab Results  (Last 5 results in the past 72 hours)        04/05/24 2034 04/05/24  1148   04/05/24  0520   04/04/24  2106   04/04/24  1644        Anion Gap     8.0           BUN     16.7           BUN/CREAT RATIO     19           Calcium     8.7           Chloride     100           CO2     29           Creatinine     0.88           eGFR     >60           Glucose     102           Hematocrit     33.3           Hemoglobin     10.8           MCH     30.9           MCHC     32.4           MCV     95.4           MPV     9.6           nRBC     0.0           Platelet Count     271           POCT Glucose 146   100     108   160       Potassium     3.8           RBC     3.49           RDW     12.8           Sodium     137           WBC     6.94                                  Significant Imaging:  I have reviewed all pertinent imaging results/findings.  X-Ray Shoulder 2 or More Views Left    Result Date: 4/3/2024  EXAMINATION: XR SHOULDER COMPLETE 2 OR MORE VIEWS LEFT CLINICAL HISTORY: post-op;Strain of muscle(s) and tendon(s) of the rotator cuff of left shoulder, initial encounter TECHNIQUE: Two views COMPARISON: March 5, 2024. FINDINGS: There is interim placement of left shoulder arthroplasty which is in satisfactory position and alignment.     As above. Electronically signed by: Luc Chavez Date:    04/03/2024 Time:    13:37    CT Shoulder Without Contrast Left    Result Date: 3/13/2024  EXAMINATION: CT SHOULDER  WITHOUT CONTRAST LEFT CLINICAL HISTORY: Shoulder pain, chronic, osteoarthritis suspected;  Strain of muscle(s) and tendon(s) of the rotator cuff of left shoulder, initial encounter TECHNIQUE: Low-dose noncontrast images of the left shoulder were obtained.  Coronal and sagittal reformats submitted for review. COMPARISON: MRI shoulder 03/11/2024 FINDINGS: There is no fracture, dislocation or destructive osseous lesion.  Acromioclavicular and glenohumeral degenerative arthrosis is stable.  No significant subchondral cystic change.  Muscle bulk and attenuation is relatively well preserved.  No fatty atrophy.  No discrete full-thickness rotator cuff tear.  No space-occupying mass.  There is no evidence for bursitis.  Biceps tendon is grossly intact. No axillary adenopathy.  The visualized lungs are clear.  There are old healed left-sided rib fractures.  There is aortic and coronary atherosclerosis.  Prior sternotomy.     Degenerative arthrosis of the left glenohumeral and acromioclavicular joint.  No acute fracture or dislocation. Old healed left-sided rib fractures. Electronically signed by: Jose Alfredo Zavala Date:    03/13/2024 Time:    08:30    X-Ray Chest PA And Lateral    Result Date: 3/12/2024  EXAMINATION: XR CHEST PA AND LATERAL CLINICAL HISTORY: Primary osteoarthritis, left shoulder TECHNIQUE: Two views of the chest COMPARISON: 03/02/2020 FINDINGS: No focal opacification, pleural effusion, or pneumothorax. The cardiomediastinal silhouette is within normal limits with postsurgical changes of median sternotomy. No acute osseous abnormality.     No acute cardiopulmonary process. Electronically signed by: Sukhwinder Kern Date:    03/12/2024 Time:    12:23    MRI Shoulder Without Contrast Left    Result Date: 3/12/2024  EXAMINATION: MRI SHOULDER WITHOUT CONTRAST LEFT CLINICAL HISTORY: 73-year-old woman with left shoulder pain.  Strain of muscle(s) and tendon(s) of the rotator cuff of left shoulder, initial encounter  TECHNIQUE: Axial T2 fat sat, coronal T2 fat sat, sagittal T2 fat sat, and sagittal T1 non-contrast 1.5T magnetic resonance imaging was performed through the left shoulder per routine protocol. COMPARISON: None.  Correlation is made with left shoulder radiographs of 03/05/2024. FINDINGS: There is moderate supraspinatus and infraspinatus tendinosis, most advanced at the junctional zone of the tendons.  There is mild, low-grade bursal surface fraying across the distal insertional supraspinatus and infraspinatus tendons with no significant associated tendon attenuation or discrete tendon tear.  The teres minor tendon is intact with mild distal insertional tendinosis.  There is mild subscapularis tendinosis.  There is no muscle edema or atrophy.  There is no abnormal fluid distending the subacromial/subdeltoid bursa.  The proximal long head biceps tendon is intact and normal in location and appearance.  There is a moderate volume of fluid distending the biceps tendon sheath.  The left glenohumeral demonstrates no effusion.  There is mild attenuation and fibrillation of the glenoid and humeral head articular cartilage without focal cartilage defect.  There is no subchondral degenerative marrow change or significant osteophyte formation.  However, there is a sizable osteochondral body at the superior subscapularis recess, measuring 8 mm on sagittal T2 fat sat series 3, image 20.  No additional osteochondral body is identified.  There is mild degenerative fraying along the superior glenoid labrum without discrete labral tear, labral displacement, or paralabral cyst.  The glenoid labrum is otherwise normal.  No pathology is identified along the glenohumeral ligaments.  There is moderate degenerative arthrosis and associated hypertrophy at the AC joint with mild inferior encroachment and no acute pathology at the AC joint.  There is a Bigliani type 2 acromion without os acromiale.  There is mild cortical irregularity at the  middle third of the greater tuberosity consistent with the rotator cuff tendinopathy.  There is no significant marrow signal abnormality or osseous lesion.  Alignment at the shoulder is anatomic and there is no acute or chronic fracture.     Moderate supraspinatus and infraspinatus tendinosis with low-grade bursal surface fraying of both distal tendons and no significant rotator cuff tendon attenuation or discrete rotator cuff tear. Mild tendinosis involving the teres minor and subscapularis. Mild proximal long head biceps tenosynovitis suggested. Mild degenerative change at the glenohumeral joint with osteochondral body at the superior subscapularis recess. Moderate degenerative arthrosis and hypertrophy at the left AC joint with mild inferior encroachment. Electronically signed by: Guero Villar Date:    03/12/2024 Time:    08:20    X-Ray Shoulder 2 or More Views Left    Result Date: 3/5/2024  See Provider Notes for results. IMPRESSION: Please see provider office/clinic notes for radiology interpretation This procedure was auto-finalized by: Virtual Radiologist    X-Ray Shoulder 2 or More Views Left  Narrative: EXAMINATION:  XR SHOULDER COMPLETE 2 OR MORE VIEWS LEFT    CLINICAL HISTORY:  post-op;Strain of muscle(s) and tendon(s) of the rotator cuff of left shoulder, initial encounter    TECHNIQUE:  Two views    COMPARISON:  March 5, 2024.    FINDINGS:  There is interim placement of left shoulder arthroplasty which is in satisfactory position and alignment.  Impression: As above.    Electronically signed by: Luc Chavez  Date:    04/03/2024  Time:    13:37         ASSESSMENT/PLAN: 73 y.o. female 3 Days Post-Op s/p left reverse total shoulder arthroplasty  -Expected ABLA stable at [facility] 10.8/33.3 on AM labs.  -Continue with current pain control regimen  -Continue with current physical therapy and none weightbearing to left upper extremity  -Continue with DVT prophylaxis  -Possible discharge home tomorrow pending PT  evaluation    Carina Amaya, VALENTEP-C  Orthopedic Surgery  [facility] - [facility]opa[facility]ics

## 2024-04-06 NOTE — PLAN OF CARE
Problem: Physical Therapy  Goal: Physical Therapy Goal  Description: Ambulate 50 ft. Using LRAD with CGA  5 Stairs: using LRAD with CGA-met  1 Step: using LRAD with CGA  Outcome: Ongoing, Progressing

## 2024-04-06 NOTE — PT/OT/SLP PROGRESS
"Occupational Therapy   Treatment    Name: Lazara Casillas  MRN: [id number]  Admitting Diagnosis:  Primary osteoarthritis of left shoulder  3 Days Post-Op    Recommendations:     Discharge Recommendations: Moderate Intensity Therapy  Discharge Equipment Recommendations:   (hemiwalker)  Barriers to discharge:       Assessment:     Lazara Casillas is a 73 y.o. female with a medical diagnosis of Primary osteoarthritis of left shoulder.  She presents with decreased memory of therapy session from this morning a requesting an education session with . Performance deficits affecting function are weakness, impaired endurance, impaired self care skills, impaired functional mobility, gait instability, impaired balance, impaired cognition, decreased coordination, decreased upper extremity function, decreased lower extremity function, decreased safety awareness, pain, decreased ROM, orthopedic precautions, impaired muscle length, impaired joint extensibility.     Rehab Prognosis:  Fair; patient would benefit from acute skilled OT services to address these deficits and reach maximum level of function.       Plan:     Patient to be seen daily to address the above listed problems via self-care/home management, therapeutic activities, therapeutic exercises  Plan of Care Expires: 04/09/24  Plan of Care Reviewed with: patient, spouse    Subjective     Chief Complaint: "I'm so tired and my shoulder hurts. I really don't want to walk."  Patient/Family Comments/goals: "I just want to go home."  Pain/Comfort:  Pain Rating 1: 8/10  Location - Side 1: Left  Location - Orientation 1: generalized  Location 1: shoulder  Pain Addressed 1: Pre-medicate for activity, Reposition, Distraction  Pain Rating Post-Intervention 1: 8/10    Objective:     Communicated with: KYLE Smith, prior to session.  Patient found up in chair with peripheral IV with spouse present upon OT entry to room.    General Precautions: Standard, fall    Orthopedic " "Precautions:LUE non weight bearing  Braces: Shoulder abduction brace  Respiratory Status: Room air     Occupational Performance:     Functional Mobility/Transfers:  Patient completed Sit <> Stand Transfer with moderate assistance  with  hemiwalker   Patient completed Tub Transfer Step Transfer technique with moderate assistance with hemiwalker; pt required max A to sequence getting her body into the tub after seated on edge and required A with BLE and trunk management.   Patient attempted car t/f with car raised but was unable to successfully complete d/t safety concerns, decreased balance, and fatigue.   Functional Mobility: Pt performed Fm for ~30' with hemiwalker and mod A - min A for hemiwalker management and sequencing gait. Pt was noted shuffle steps and have instances of RLE buckling and LOB requiring assistance from therapist. Pt and pt's  did not seem concerned.    Treatment & Education:  Pt performed FM, attempted car t/f in car simulator made to mimic Sloane Leonardo  without success, and completed tub t/f with Max A, as pt and pts's  said, "She will not be able to fit a shower chair, herself, and a hemiwalker in our walk-in shower. We have a  tub that we can fit a TTB, maybe we could use that." At the end of the session, pt's  discussed the possibility of installing a built-in shower chair and grab bars in walk-in shower.     Patient left up in chair with all lines intact, call button in reach, KYLE Smith, notified, and  present    GOALS:   Multidisciplinary Problems       Occupational Therapy Goals          Problem: Occupational Therapy    Goal Priority Disciplines Outcome Interventions   Occupational Therapy Goal     OT, PT/OT Ongoing, Progressing    Description: Pt will perform UB dress c max assist using hemitechniques by d/c. MET  Pt will perform LB dress c max using hemitechniques by d/c. MET  Pt will perform toilet t/f using LRAD c mod assist by d/c.  Pt " will perform toileting c mod assist by d/c.   Pt will perform Walk in shower t/f c max assist by d/c.   Pt will be Ind c HEP and pxns Compliance by d/c.                         Time Tracking:     OT Date of Treatment: 04/06/24  OT Start Time: 1330  OT Stop Time: 1408  OT Total Time (min): 38 min    Billable Minutes:Therapeutic Activity 38    OT/RUSS: OT          4/6/2024

## 2024-04-06 NOTE — PT/OT/SLP PROGRESS
Physical Therapy Treatment    Patient Name:  Lazara Casillas   MRN:  [id number]    Recommendations:     Discharge Recommendations: Moderate Intensity Therapy  Discharge Equipment Recommendations: cane, quad (vs. hemiwalker)  Barriers to discharge:  impaired mobility    Assessment:     Lazara Casillas is a 73 y.o. female admitted with a medical diagnosis of Primary osteoarthritis of left shoulder.  She presents with the following impairments/functional limitations: weakness, impaired endurance, impaired functional mobility, gait instability, decreased upper extremity function, decreased lower extremity function, pain, decreased ROM, edema, impaired coordination, orthopedic precautions .    Rehab Prognosis: Good; patient would benefit from acute skilled PT services to address these deficits and reach maximum level of function.    Recent Surgery: Procedure(s) (LRB):  ARTHROPLASTY, SHOULDER, TOTAL, REVERSE (Left) 3 Days Post-Op    Plan:     During this hospitalization, patient to be seen daily (QD-BID) to address the identified rehab impairments via gait training, therapeutic activities, therapeutic exercises and progress toward the following goals:    Plan of Care Expires:  04/09/24    Subjective     Chief Complaint: pain in arm  Patient/Family Comments/goals: get better  Pain/Comfort:         Objective:     Communicated with rn prior to session.  Patient found up in chair with   upon PT entry to room.     General Precautions: Standard, fall  Orthopedic Precautions: LUE non weight bearing  Braces: Shoulder abduction brace  Respiratory Status: Room air     Functional Mobility:  Transfers:     Sit to Stand:  contact guard assistance with hemiwalker  Bed to Chair: contact guard assistance with  hemiwalker  using  Step Transfer  Gait: pt amb 40ft w/hemiwalker. Pt required constant vc for proper sequencing of walker.   Stairs:  Pt ascended/descended 6 stair(s) with No Assistive Device with right handrail with Contact Guard  Assistance. Pt required vc for proper technique. Pt required increase time.       Patient left up in chair with all lines intact and call button in reach..    GOALS:   Multidisciplinary Problems       Physical Therapy Goals          Problem: Physical Therapy    Goal Priority Disciplines Outcome Goal Variances Interventions   Physical Therapy Goal     PT, PT/OT Ongoing, Progressing     Description: Ambulate 50 ft. Using LRAD with CGA  5 Stairs: using LRAD with CGA-met  1 Step: using LRAD with CGA                       Time Tracking:     PT Received On:    PT Start Time: 0850     PT Stop Time: 0917  PT Total Time (min): 27 min     Billable Minutes: Gait Training 10 and Therapeutic Activity 17    Treatment Type: Treatment  PT/PTA: PTA     Number of PTA visits since last PT visit: 1 04/06/2024

## 2024-04-06 NOTE — PT/OT/SLP PROGRESS
"Occupational Therapy   Treatment    Name: Lazara Casillas  MRN: [id number]  Admitting Diagnosis:  Primary osteoarthritis of left shoulder  3 Days Post-Op    Recommendations:     Discharge Recommendations: Moderate Intensity Therapy  Discharge Equipment Recommendations:   (hemiwalker)  Barriers to discharge:       Assessment:     Lazara Casillas is a 73 y.o. female with a medical diagnosis of Primary osteoarthritis of left shoulder.  She presents with increased pain and decreased carry-over for UB dressing. Performance deficits affecting function are weakness, impaired endurance, impaired self care skills, impaired functional mobility, gait instability, impaired balance, impaired cognition, decreased coordination, decreased upper extremity function, decreased lower extremity function, decreased safety awareness, pain, decreased ROM, orthopedic precautions, impaired muscle length, impaired joint extensibility.     Rehab Prognosis:  Fair; patient would benefit from acute skilled OT services to address these deficits and reach maximum level of function.       Plan:     Patient to be seen daily to address the above listed problems via self-care/home management, therapeutic activities, therapeutic exercises  Plan of Care Expires: 04/09/24  Plan of Care Reviewed with: patient, spouse    Subjective     Chief Complaint: "My shoulder hurts anytime I move it, and I can't get dressed without help."  Patient/Family Comments/goals: "I want to go home."  Pain/Comfort:  Pain Rating 1: 8/10  Location - Side 1: Left  Location - Orientation 1: generalized  Location 1: shoulder  Pain Addressed 1: Pre-medicate for activity, Reposition, Distraction  Pain Rating Post-Intervention 1: 8/10    Objective:     Communicated with: KYLE Smith, prior to session.  Patient found up in chair with peripheral IV upon OT entry to room.    General Precautions: Standard, fall    Orthopedic Precautions:LUE non weight bearing  Braces: Shoulder abduction " brace  Respiratory Status: Room air     Activities of Daily Living:  Upper Body Dressing: Pt required assistance to orient tank-top correct way. Pt also required A for sequencing vinod dressing techniques when doff/luiza. Pt completed successfully after two unsuccessful attempts with min A for pulling down top. Pt also required total A to luiza/doff shoulder abduction brace d/t decreased memory of how to luiza/doff.  Pt requested education this afternoon at 1330 with  and therapist agreed.    Treatment & Education:  Pt demonstrated decreased memory for carryover for education regarding dressing. Pt required education for shoulder precautions as pt asked what were her precautions. Pt would benefit from SNF.    Patient left up in chair with all lines intact, call button in reach, and KYLE Smith, notified    GOALS:   Multidisciplinary Problems       Occupational Therapy Goals          Problem: Occupational Therapy    Goal Priority Disciplines Outcome Interventions   Occupational Therapy Goal     OT, PT/OT Ongoing, Progressing    Description: Pt will perform UB dress c max assist using hemitechniques by d/c. MET  Pt will perform LB dress c max using hemitechniques by d/c. MET  Pt will perform toilet t/f using LRAD c mod assist by d/c.  Pt will perform toileting c mod assist by d/c.   Pt will perform Walk in shower t/f c max assist by d/c.   Pt will be Ind c HEP and pxns Compliance by d/c.                         Time Tracking:     OT Date of Treatment: 04/06/24  OT Start Time: 0802  OT Stop Time: 0830  OT Total Time (min): 28 min    Billable Minutes:Self Care/Home Management 28    OT/RUSS: OT          4/6/2024

## 2024-04-06 NOTE — PLAN OF CARE
Problem: Adult Inpatient Plan of Care  Goal: Plan of Care Review  Outcome: Ongoing, Progressing  Goal: Optimal Comfort and Wellbeing  Outcome: Ongoing, Progressing  Goal: Readiness for Transition of Care  Outcome: Ongoing, Progressing     Problem: Infection  Goal: Absence of Infection Signs and Symptoms  Outcome: Ongoing, Progressing     Problem: Pain Acute  Goal: Acceptable Pain Control and Functional Ability  Outcome: Ongoing, Progressing     Problem: Fall Injury Risk  Goal: Absence of Fall and Fall-Related Injury  Outcome: Ongoing, Progressing     Problem: Bowel Motility Impaired (Shoulder Arthroplasty)  Goal: Effective Bowel Elimination  Outcome: Ongoing, Progressing

## 2024-04-07 LAB
POCT GLUCOSE: 104 MG/DL (ref 70–110)
POCT GLUCOSE: 117 MG/DL (ref 70–110)
POCT GLUCOSE: 129 MG/DL (ref 70–110)
POCT GLUCOSE: 82 MG/DL (ref 70–110)
POCT GLUCOSE: 89 MG/DL (ref 70–110)

## 2024-04-07 PROCEDURE — 25000003 PHARM REV CODE 250

## 2024-04-07 PROCEDURE — 25000003 PHARM REV CODE 250: Performed by: NURSE PRACTITIONER

## 2024-04-07 PROCEDURE — 97116 GAIT TRAINING THERAPY: CPT

## 2024-04-07 PROCEDURE — 94799 UNLISTED PULMONARY SVC/PX: CPT | Mod: XB

## 2024-04-07 PROCEDURE — 11000001 HC ACUTE MED/SURG PRIVATE ROOM

## 2024-04-07 PROCEDURE — 25000003 PHARM REV CODE 250: Performed by: ORTHOPAEDIC SURGERY

## 2024-04-07 PROCEDURE — 94761 N-INVAS EAR/PLS OXIMETRY MLT: CPT

## 2024-04-07 PROCEDURE — 99900031 HC PATIENT EDUCATION (STAT)

## 2024-04-07 PROCEDURE — 97535 SELF CARE MNGMENT TRAINING: CPT

## 2024-04-07 PROCEDURE — 97110 THERAPEUTIC EXERCISES: CPT

## 2024-04-07 PROCEDURE — 97530 THERAPEUTIC ACTIVITIES: CPT

## 2024-04-07 RX ORDER — TRAMADOL HYDROCHLORIDE 50 MG/1
50 TABLET ORAL EVERY 4 HOURS PRN
Status: DISCONTINUED | OUTPATIENT
Start: 2024-04-07 | End: 2024-04-09 | Stop reason: HOSPADM

## 2024-04-07 RX ORDER — ACETAMINOPHEN 500 MG
500 TABLET ORAL
Status: DISCONTINUED | OUTPATIENT
Start: 2024-04-07 | End: 2024-04-09 | Stop reason: HOSPADM

## 2024-04-07 RX ORDER — HYDROCODONE BITARTRATE AND ACETAMINOPHEN 5; 325 MG/1; MG/1
1 TABLET ORAL EVERY 4 HOURS PRN
Status: DISCONTINUED | OUTPATIENT
Start: 2024-04-07 | End: 2024-04-09 | Stop reason: HOSPADM

## 2024-04-07 RX ADMIN — PROPRANOLOL HYDROCHLORIDE 10 MG: 10 TABLET ORAL at 08:04

## 2024-04-07 RX ADMIN — ASPIRIN 81 MG: 81 TABLET, COATED ORAL at 08:04

## 2024-04-07 RX ADMIN — ESCITALOPRAM OXALATE 20 MG: 20 TABLET, FILM COATED ORAL at 09:04

## 2024-04-07 RX ADMIN — ASPIRIN 81 MG: 81 TABLET, COATED ORAL at 09:04

## 2024-04-07 RX ADMIN — FLUTICASONE FUROATE 1 PUFF: 100 POWDER RESPIRATORY (INHALATION) at 09:04

## 2024-04-07 RX ADMIN — TRAMADOL HYDROCHLORIDE 50 MG: 50 TABLET, COATED ORAL at 06:04

## 2024-04-07 RX ADMIN — UMECLIDINIUM BROMIDE AND VILANTEROL TRIFENATATE 1 PUFF: 62.5; 25 POWDER RESPIRATORY (INHALATION) at 09:04

## 2024-04-07 RX ADMIN — PROPRANOLOL HYDROCHLORIDE 10 MG: 10 TABLET ORAL at 03:04

## 2024-04-07 RX ADMIN — CARIPRAZINE 1.5 MG: 1.5 CAPSULE, GELATIN COATED ORAL at 09:04

## 2024-04-07 RX ADMIN — PROPRANOLOL HYDROCHLORIDE 10 MG: 10 TABLET ORAL at 09:04

## 2024-04-07 RX ADMIN — ISOSORBIDE MONONITRATE 60 MG: 60 TABLET, EXTENDED RELEASE ORAL at 09:04

## 2024-04-07 RX ADMIN — ACETAMINOPHEN 500 MG: 500 TABLET ORAL at 06:04

## 2024-04-07 RX ADMIN — SENNOSIDES AND DOCUSATE SODIUM 2 TABLET: 8.6; 5 TABLET ORAL at 08:04

## 2024-04-07 RX ADMIN — DOXEPIN HYDROCHLORIDE 10 MG: 10 CAPSULE ORAL at 08:04

## 2024-04-07 RX ADMIN — HYDROCHLOROTHIAZIDE 12.5 MG: 12.5 TABLET ORAL at 09:04

## 2024-04-07 RX ADMIN — LOSARTAN POTASSIUM 50 MG: 50 TABLET, FILM COATED ORAL at 09:04

## 2024-04-07 RX ADMIN — DULOXETINE HYDROCHLORIDE 60 MG: 30 CAPSULE, DELAYED RELEASE ORAL at 08:04

## 2024-04-07 RX ADMIN — MONTELUKAST SODIUM 10 MG: 5 TABLET, CHEWABLE ORAL at 08:04

## 2024-04-07 RX ADMIN — BUPROPION HYDROCHLORIDE 150 MG: 150 TABLET, EXTENDED RELEASE ORAL at 06:04

## 2024-04-07 RX ADMIN — ACETAMINOPHEN 500 MG: 500 TABLET ORAL at 09:04

## 2024-04-07 NOTE — PROGRESS NOTES
[facility] - [facility]  Orthopedic Surgery  Progress Note:      Patient Name: Lazara Casillas  MRN: [id number]  Admission Date: 4/3/2024  Attending Provider:  Watson Hennessy MD  Hospital Length of Stay:  4 days  Postoperative Day: 4 Days Post-Op      SUBJECTIVE:  Postop day two status post left reverse total shoulder arthroplasty. Patient reports no acute issues overnight and adequate control of pain on current regimen.  Patient worked with physical therapy over the last 24 hours with slow improvements today, walked 40ft. Although PT reports patient still continues to be unsteady and would benefit from possible in patient rehab.      OBJECTIVE:    Physical Examination:  General: Well-developed, well-nourished.  Neuro: Alert and oriented x 3.  Psych: Normal mood and affect.  Left upper extremity:  Incisions clean, dry, and intact. No signs of infection. Neurovascular intact distally. Sensation intact. Abduction sling in place.    Vitals:    04/07/24 0019 04/07/24 0310 04/07/24 0311 04/07/24 0736   BP: 130/72 (!) 158/91 (!) 150/85 (!) 170/82   Pulse: 73 72  62   Resp:    20   Temp: 98.3 °F (36.8 °C) 98.1 °F (36.7 °C)  98 °F (36.7 °C)   TempSrc: Oral Oral  Oral   SpO2: (!) 94% (!) 94%  96%   Weight:       Height:         I/O last 3 completed shifts:  In: 480 [P.O.:480]  Out: 240 [Urine:240]    Medications:  Scheduled Meds:   aspirin  81 mg Oral BID    bisacodyL  10 mg Rectal Daily    buPROPion  150 mg Oral QAM    cariprazine  1.5 mg Oral Daily    doxepin  10 mg Oral QHS    DULoxetine  60 mg Oral QHS    EScitalopram oxalate  20 mg Oral Daily    umeclidinium-vilanteroL  1 puff Inhalation Daily    And    fluticasone furoate  1 puff Inhalation Daily    losartan  50 mg Oral Daily    And    hydroCHLOROthiazide  12.5 mg Oral Daily    insulin detemir U-100  52 Units Subcutaneous QHS    isosorbide mononitrate  60 mg Oral Daily    linaCLOtide  145 mcg Oral Before breakfast    montelukast  10 mg Oral QHS     propranoloL  10 mg Oral TID    senna-docusate 8.6-50 mg  2 tablet Oral QHS     Continuous Infusions:   sodium chloride 0.9% 75 mL/hr at 04/03/24 1312     PRN Meds:.albuterol, aluminum-magnesium hydroxide-simethicone, baclofen, dextrose 10%, dextrose 10%, diazePAM, glucagon (human recombinant), glucose, glucose, HYDROcodone-acetaminophen, HYDROcodone-acetaminophen, insulin aspart U-100, lactulose, loperamide, meclizine, melatonin, methocarbamoL, ondansetron    Significant Labs: All pertinent labs within the past 24 hours have been reviewed.  Recent Lab Results  (Last 5 results in the past 72 hours)        04/07/24  0500   04/06/24 2022 04/06/24  1653   04/06/24  1144   04/06/24  0609        POCT Glucose 82   121   121   105   89                              Significant Imaging:  I have reviewed all pertinent imaging results/findings.  X-Ray Shoulder 2 or More Views Left    Result Date: 4/3/2024  EXAMINATION: XR SHOULDER COMPLETE 2 OR MORE VIEWS LEFT CLINICAL HISTORY: post-op;Strain of muscle(s) and tendon(s) of the rotator cuff of left shoulder, initial encounter TECHNIQUE: Two views COMPARISON: March 5, 2024. FINDINGS: There is interim placement of left shoulder arthroplasty which is in satisfactory position and alignment.     As above. Electronically signed by: Luc Chavez Date:    04/03/2024 Time:    13:37    CT Shoulder Without Contrast Left    Result Date: 3/13/2024  EXAMINATION: CT SHOULDER WITHOUT CONTRAST LEFT CLINICAL HISTORY: Shoulder pain, chronic, osteoarthritis suspected;  Strain of muscle(s) and tendon(s) of the rotator cuff of left shoulder, initial encounter TECHNIQUE: Low-dose noncontrast images of the left shoulder were obtained.  Coronal and sagittal reformats submitted for review. COMPARISON: MRI shoulder 03/11/2024 FINDINGS: There is no fracture, dislocation or destructive osseous lesion.  Acromioclavicular and glenohumeral degenerative arthrosis is stable.  No significant subchondral cystic  change.  Muscle bulk and attenuation is relatively well preserved.  No fatty atrophy.  No discrete full-thickness rotator cuff tear.  No space-occupying mass.  There is no evidence for bursitis.  Biceps tendon is grossly intact. No axillary adenopathy.  The visualized lungs are clear.  There are old healed left-sided rib fractures.  There is aortic and coronary atherosclerosis.  Prior sternotomy.     Degenerative arthrosis of the left glenohumeral and acromioclavicular joint.  No acute fracture or dislocation. Old healed left-sided rib fractures. Electronically signed by: Jose Alfredo Zavala Date:    03/13/2024 Time:    08:30    X-Ray Chest PA And Lateral    Result Date: 3/12/2024  EXAMINATION: XR CHEST PA AND LATERAL CLINICAL HISTORY: Primary osteoarthritis, left shoulder TECHNIQUE: Two views of the chest COMPARISON: 03/02/2020 FINDINGS: No focal opacification, pleural effusion, or pneumothorax. The cardiomediastinal silhouette is within normal limits with postsurgical changes of median sternotomy. No acute osseous abnormality.     No acute cardiopulmonary process. Electronically signed by: Sukhwinder Kern Date:    03/12/2024 Time:    12:23    MRI Shoulder Without Contrast Left    Result Date: 3/12/2024  EXAMINATION: MRI SHOULDER WITHOUT CONTRAST LEFT CLINICAL HISTORY: 73-year-old woman with left shoulder pain.  Strain of muscle(s) and tendon(s) of the rotator cuff of left shoulder, initial encounter TECHNIQUE: Axial T2 fat sat, coronal T2 fat sat, sagittal T2 fat sat, and sagittal T1 non-contrast 1.5T magnetic resonance imaging was performed through the left shoulder per routine protocol. COMPARISON: None.  Correlation is made with left shoulder radiographs of 03/05/2024. FINDINGS: There is moderate supraspinatus and infraspinatus tendinosis, most advanced at the junctional zone of the tendons.  There is mild, low-grade bursal surface fraying across the distal insertional supraspinatus and infraspinatus tendons with no  significant associated tendon attenuation or discrete tendon tear.  The teres minor tendon is intact with mild distal insertional tendinosis.  There is mild subscapularis tendinosis.  There is no muscle edema or atrophy.  There is no abnormal fluid distending the subacromial/subdeltoid bursa.  The proximal long head biceps tendon is intact and normal in location and appearance.  There is a moderate volume of fluid distending the biceps tendon sheath.  The left glenohumeral demonstrates no effusion.  There is mild attenuation and fibrillation of the glenoid and humeral head articular cartilage without focal cartilage defect.  There is no subchondral degenerative marrow change or significant osteophyte formation.  However, there is a sizable osteochondral body at the superior subscapularis recess, measuring 8 mm on sagittal T2 fat sat series 3, image 20.  No additional osteochondral body is identified.  There is mild degenerative fraying along the superior glenoid labrum without discrete labral tear, labral displacement, or paralabral cyst.  The glenoid labrum is otherwise normal.  No pathology is identified along the glenohumeral ligaments.  There is moderate degenerative arthrosis and associated hypertrophy at the AC joint with mild inferior encroachment and no acute pathology at the AC joint.  There is a Bigliani type 2 acromion without os acromiale.  There is mild cortical irregularity at the middle third of the greater tuberosity consistent with the rotator cuff tendinopathy.  There is no significant marrow signal abnormality or osseous lesion.  Alignment at the shoulder is anatomic and there is no acute or chronic fracture.     Moderate supraspinatus and infraspinatus tendinosis with low-grade bursal surface fraying of both distal tendons and no significant rotator cuff tendon attenuation or discrete rotator cuff tear. Mild tendinosis involving the teres minor and subscapularis. Mild proximal long head biceps  tenosynovitis suggested. Mild degenerative change at the glenohumeral joint with osteochondral body at the superior subscapularis recess. Moderate degenerative arthrosis and hypertrophy at the left AC joint with mild inferior encroachment. Electronically signed by: Guero Villar Date:    03/12/2024 Time:    08:20    X-Ray Shoulder 2 or More Views Left    Result Date: 3/5/2024  See Provider Notes for results. IMPRESSION: Please see provider office/clinic notes for radiology interpretation This procedure was auto-finalized by: [facility]    X-Ray Shoulder 2 or More Views Left  Narrative: EXAMINATION:  XR SHOULDER COMPLETE 2 OR MORE VIEWS LEFT    CLINICAL HISTORY:  post-op;Strain of muscle(s) and tendon(s) of the rotator cuff of left shoulder, initial encounter    TECHNIQUE:  Two views    COMPARISON:  March 5, 2024.    FINDINGS:  There is interim placement of left shoulder arthroplasty which is in satisfactory position and alignment.  Impression: As above.    Electronically signed by: Luc Chavez  Date:    04/03/2024  Time:    13:37         ASSESSMENT/PLAN: 73 y.o. female 4 Days Post-Op s/p left reverse total shoulder arthroplasty  -Expected ABLA stable at [facility] 10.8/33.3 on AM labs.  -Continue with current pain control regimen  -Continue with current physical therapy and none weightbearing to left upper extremity  -Continue with DVT prophylaxis  -Possible discharge home vs placement tomorrow pending PT evaluation    CANDELARIA LathamC  Orthopedic Surgery  [facility] - Orthopaedics

## 2024-04-07 NOTE — PT/OT/SLP PROGRESS
"Occupational Therapy   Treatment    Name: Lazara Casillas  MRN: [id number]  Admitting Diagnosis:  Primary osteoarthritis of left shoulder  4 Days Post-Op    Recommendations:     Discharge Recommendations: Moderate Intensity Therapy  Discharge Equipment Recommendations:   (hemiwalker)  Barriers to discharge:       Assessment:     Lazara Casillas is a 73 y.o. female with a medical diagnosis of Primary osteoarthritis of left shoulder.  She presents with increased coordination with functional mobility as compared to yesterday Pt still has posterior lean when standing and decreased safety awareness limiting functional outcomes. Performance deficits affecting function are weakness, impaired endurance, impaired sensation, impaired functional mobility, gait instability, impaired balance, impaired self care skills, impaired cognition, decreased upper extremity function, decreased lower extremity function, decreased safety awareness, impaired coordination, orthopedic precautions.     Rehab Prognosis:  Good; patient would benefit from acute skilled OT services to address these deficits and reach maximum level of function.       Plan:     Patient to be seen daily to address the above listed problems via self-care/home management, therapeutic activities, therapeutic exercises  Plan of Care Expires: 04/09/24  Plan of Care Reviewed with: patient, spouse    Subjective     Chief Complaint: "I was trying to take a nap but I'm not tired."  Patient/Family Comments/goals: "I think my brain feels off."  Pain/Comfort:  Pain Rating 1: 0/10  Pain Rating Post-Intervention 1: 0/10    Objective:     Communicated with: KYLE Smith, prior to session.  Patient found HOB elevated with peripheral IV upon OT entry to room.    General Precautions: Standard, fall    Orthopedic Precautions:LUE non weight bearing  Braces: N/A, Shoulder abduction brace  Respiratory Status: Room air     Occupational Performance:     Bed Mobility:    Patient completed " "Rolling/Turning to Left with Mod I with use of bed rails.  Patient completed HOB elevated to Sit with min A for trunk management    Functional Mobility/Transfers:  Patient completed Sit <> Stand Transfer with minimum assistance  with  hemiwalker   Patient attempted step t/f with hemiwalker to t/f into raised car using "curb" platform for assistance. Pt required min A to step onto "curb" platform with hemiwalker and min A for LLE management. Pt required mod Ax1 for sit<>stand out of car and was successful after x3 attempts d/t posterior lean. Pt required mod Ax2 to step down from "curb" platform with hemiwalker d/t pt's right foot getting fixed on platform while left foot was on ground. Pt's  assisted therapist to steady pt, while therapist facilitated R foot's movement and steadied pt.   Functional Mobility: Pt performed FM for 90' with hemiwalker with CGA. Pt was noted to have 2 instances of LOB but was able to correct self.     Treatment & Education:  Pt demonstrates better functional mobility as compared to yesterday, but pt is still unsafe to d/c home with help from spouse. Pt is a fall risk and demonstrates decreased safety awareness and insight to deficits.     Patient left up in chair with all lines intact, call button in reach, KYLE Smith, notified, and spouse present    GOALS:   Multidisciplinary Problems       Occupational Therapy Goals          Problem: Occupational Therapy    Goal Priority Disciplines Outcome Interventions   Occupational Therapy Goal     OT, PT/OT Ongoing, Progressing    Description: Pt will perform UB dress c max assist using hemitechniques by d/c. MET  Pt will perform LB dress c max using hemitechniques by d/c. MET  Pt will perform toilet t/f using LRAD c mod assist by d/c.  Pt will perform toileting c mod assist by d/c.   Pt will perform Walk in shower t/f c max assist by d/c.   Pt will be Ind c HEP and pxns Compliance by d/c.                         Time Tracking:     OT Date " of Treatment: 04/07/24  OT Start Time: 1440  OT Stop Time: 1506  OT Total Time (min): 26 min    Billable Minutes:Therapeutic Activity 26    OT/RUSS: OT          4/7/2024

## 2024-04-07 NOTE — PLAN OF CARE
Problem: Adult Inpatient Plan of Care  Goal: Plan of Care Review  Outcome: Ongoing, Progressing  Flowsheets (Taken 4/6/2024 2148)  Plan of Care Reviewed With: patient     Problem: Pain Acute  Goal: Acceptable Pain Control and Functional Ability  Outcome: Ongoing, Progressing  Intervention: Develop Pain Management Plan  Flowsheets (Taken 4/6/2024 2148)  Pain Management Interventions:   around-the-clock dosing utilized   medication offered   position adjusted   pillow support provided     Problem: Fall Injury Risk  Goal: Absence of Fall and Fall-Related Injury  Outcome: Ongoing, Progressing  Intervention: Identify and Manage Contributors  Flowsheets (Taken 4/6/2024 2148)  Self-Care Promotion: safe use of adaptive equipment encouraged  Medication Review/Management: medications reviewed     Problem: Bleeding (Shoulder Arthroplasty)  Goal: Absence of Bleeding  Outcome: Ongoing, Progressing  Intervention: Monitor and Manage Bleeding  Flowsheets (Taken 4/6/2024 2148)  Bleeding Management: dressing monitored     Problem: Pain (Shoulder Arthroplasty)  Goal: Acceptable Pain Control  Outcome: Ongoing, Progressing  Intervention: Prevent or Manage Pain  Flowsheets (Taken 4/6/2024 2148)  Pain Management Interventions:   around-the-clock dosing utilized   medication offered   position adjusted   pillow support provided     Problem: Diabetes Comorbidity  Goal: Blood Glucose Level Within Targeted Range  Outcome: Ongoing, Progressing  Intervention: Monitor and Manage Glycemia  Flowsheets (Taken 4/6/2024 2148)  Glycemic Management:   blood glucose monitored   supplemental insulin given

## 2024-04-07 NOTE — PT/OT/SLP PROGRESS
Physical Therapy Treatment    Patient Name:  Lazara Casillas   MRN:  [id number]    Recommendations:     Discharge Recommendations: Moderate Intensity Therapy  Discharge Equipment Recommendations: cane, quad (vs. hemiwalker)  Barriers to discharge: Inaccessible home    Assessment:     Lazara Casillas is a 73 y.o. female admitted with a medical diagnosis of Primary osteoarthritis of left shoulder.  She presents with the following impairments/functional limitations: weakness, impaired endurance, impaired sensation, impaired functional mobility, gait instability, impaired balance .    Rehab Prognosis: Fair; patient would benefit from acute skilled PT services to address these deficits and reach maximum level of function.    Recent Surgery: Procedure(s) (LRB):  ARTHROPLASTY, SHOULDER, TOTAL, REVERSE (Left) 4 Days Post-Op    Plan:     During this hospitalization, patient to be seen daily (QD-BID) to address the identified rehab impairments via gait training, therapeutic activities, therapeutic exercises and progress toward the following goals:    Plan of Care Expires:  04/09/24    Subjective     Chief Complaint: pain  Patient/Family Comments/goals: to be able to walk by myself  Pain/Comfort:  Pain Rating 1: 7/10  Location - Side 1: Left  Location - Orientation 1: generalized  Location 1: shoulder  Pain Addressed 1: Pre-medicate for activity, Nurse notified  Pain Rating Post-Intervention 1: 8/10      Objective:     Communicated with NSG prior to session.  Patient found up in chair with peripheral IV upon PT entry to room.     General Precautions: Standard, fall  Orthopedic Precautions: LUE non weight bearing  Braces: Shoulder abduction brace  Respiratory Status: Room air     Functional Mobility:  Transfers:     Sit to Stand:  moderate assistance with hemiwalker  Gait: 25 feet with hemiwalker with ModA for safety, balance, sequencing with multiple LOB with recovery by PT    Treatment & Education:  Pt had LOB during sit to stand  posteriorly with PT recovery. Pt reports that she has 5 steps to enter home. Pt is absolutely not appropriate to go home due to decreased safety. She is unable to initiate a t/f without assistance. She would be a good candidate for SNF or inpatient rehab.    Patient left up in chair with call button in reach..    GOALS:   Multidisciplinary Problems       Physical Therapy Goals          Problem: Physical Therapy    Goal Priority Disciplines Outcome Goal Variances Interventions   Physical Therapy Goal     PT, PT/OT Ongoing, Progressing     Description: Ambulate 50 ft. Using LRAD with CGA  5 Stairs: using LRAD with CGA-met  1 Step: using LRAD with CGA                       Time Tracking:     PT Received On: 04/07/24  PT Start Time: 0830     PT Stop Time: 0900  PT Total Time (min): 30 min     Billable Minutes: Gait Training 30    Treatment Type: Treatment  PT/PTA: PT     Number of PTA visits since last PT visit: 1 04/07/2024

## 2024-04-07 NOTE — PT/OT/SLP PROGRESS
"Occupational Therapy   Treatment    Name: Lazara Casillas  MRN: [id number]  Admitting Diagnosis:  Primary osteoarthritis of left shoulder  4 Days Post-Op    Recommendations:     Discharge Recommendations: Moderate Intensity Therapy  Discharge Equipment Recommendations:   (hemiwalker)  Barriers to discharge:       Assessment:     Lazara Casillas is a 73 y.o. female with a medical diagnosis of Primary osteoarthritis of left shoulder.  She presents with decreased functional mobility and carry over for education. Performance deficits affecting function are weakness, impaired endurance, impaired sensation, impaired functional mobility, gait instability, impaired balance.     Rehab Prognosis:  Good; patient would benefit from acute skilled OT services to address these deficits and reach maximum level of function.       Plan:     Patient to be seen daily to address the above listed problems via self-care/home management, therapeutic activities, therapeutic exercises  Plan of Care Expires: 04/09/24  Plan of Care Reviewed with: patient, spouse    Subjective     Chief Complaint: "I am fine with going home or going to SNF."  Patient/Family Comments/goals: Pt and pt's  wants to find a safe d/c option that is cost-efficient for them.   Pain/Comfort:  Pain Rating 1: 0/10  Pain Rating Post-Intervention 1: 0/10    Objective:     Communicated with: KYLE Smith, prior to session.  Patient found up in chair with peripheral IV upon OT entry to room.    General Precautions: Standard, fall    Orthopedic Precautions:LUE non weight bearing  Braces: Shoulder abduction brace  Respiratory Status: Room air     Occupational Performance:     Functional Mobility/Transfers:  Patient completed Sit <> Stand Transfer with minimum assistance for lifting and steadying d/t increased posterior lean with hemiwalker   Patient completed Toilet Transfer Step Transfer technique with minimum assistance with hemiwalker management and sequencing.  Functional " "Mobility: Pt performed FM for ~12' within room for BADL completion with hemiwalker with min A for hemiwalker management and steadying A.    Activities of Daily Living:  Toileting: minimum assistance for clothing management for safety d/t decreased balance    Treatment & Education:  Pt tolerated elbow, forearm, wrist, and digit AROM therex while seated without shoulder abduction brace for improved UE function and to decrease risk for tightness. Pt reported feeling slight pain with elbow flexion and extension, saying, "It feels really stiff to me." Therapist spoke with pt and pt's spouse regarding decreased safety for pt if she were to d/c to home. Pt and pt's spouse verbalized understanding and agreed. Therapist provided edu for IPR vs SNF. Pt and pt's spouse verbalized understanding and expressed that they want the most cost-efficient option, as pt is not safe to d/c home.     Patient left up in chair with all lines intact, call button in reach, and spouse present.    GOALS:   Multidisciplinary Problems       Occupational Therapy Goals          Problem: Occupational Therapy    Goal Priority Disciplines Outcome Interventions   Occupational Therapy Goal     OT, PT/OT Ongoing, Progressing    Description: Pt will perform UB dress c max assist using hemitechniques by d/c. MET  Pt will perform LB dress c max using hemitechniques by d/c. MET  Pt will perform toilet t/f using LRAD c mod assist by d/c.  Pt will perform toileting c mod assist by d/c.   Pt will perform Walk in shower t/f c max assist by d/c.   Pt will be Ind c HEP and pxns Compliance by d/c.                         Time Tracking:     OT Date of Treatment: 04/07/24  OT Start Time: 1024  OT Stop Time: 1056  OT Total Time (min): 32 min    Billable Minutes: Self management 20  Therapeutic Exercise 12    OT/RUSS: OT          4/7/2024  "

## 2024-04-08 LAB
GLUCOSE SERPL-MCNC: 132 MG/DL (ref 70–110)
POCT GLUCOSE: 102 MG/DL (ref 70–110)
POCT GLUCOSE: 122 MG/DL (ref 70–110)

## 2024-04-08 PROCEDURE — 25000003 PHARM REV CODE 250: Performed by: ORTHOPAEDIC SURGERY

## 2024-04-08 PROCEDURE — 63600175 PHARM REV CODE 636 W HCPCS: Performed by: NURSE PRACTITIONER

## 2024-04-08 PROCEDURE — 97530 THERAPEUTIC ACTIVITIES: CPT | Mod: CQ

## 2024-04-08 PROCEDURE — 97110 THERAPEUTIC EXERCISES: CPT

## 2024-04-08 PROCEDURE — 11000001 HC ACUTE MED/SURG PRIVATE ROOM

## 2024-04-08 PROCEDURE — 25000003 PHARM REV CODE 250: Performed by: NURSE PRACTITIONER

## 2024-04-08 PROCEDURE — 97530 THERAPEUTIC ACTIVITIES: CPT

## 2024-04-08 PROCEDURE — 99900035 HC TECH TIME PER 15 MIN (STAT)

## 2024-04-08 PROCEDURE — 25000003 PHARM REV CODE 250

## 2024-04-08 PROCEDURE — 94799 UNLISTED PULMONARY SVC/PX: CPT | Mod: XB

## 2024-04-08 PROCEDURE — 97116 GAIT TRAINING THERAPY: CPT | Mod: CQ

## 2024-04-08 PROCEDURE — 99900031 HC PATIENT EDUCATION (STAT)

## 2024-04-08 PROCEDURE — 94761 N-INVAS EAR/PLS OXIMETRY MLT: CPT

## 2024-04-08 RX ADMIN — TRAMADOL HYDROCHLORIDE 50 MG: 50 TABLET, COATED ORAL at 08:04

## 2024-04-08 RX ADMIN — ACETAMINOPHEN 500 MG: 500 TABLET ORAL at 10:04

## 2024-04-08 RX ADMIN — HYDROCHLOROTHIAZIDE 12.5 MG: 12.5 TABLET ORAL at 08:04

## 2024-04-08 RX ADMIN — LOSARTAN POTASSIUM 50 MG: 50 TABLET, FILM COATED ORAL at 08:04

## 2024-04-08 RX ADMIN — ESCITALOPRAM OXALATE 20 MG: 20 TABLET, FILM COATED ORAL at 08:04

## 2024-04-08 RX ADMIN — DOXEPIN HYDROCHLORIDE 10 MG: 10 CAPSULE ORAL at 08:04

## 2024-04-08 RX ADMIN — DULOXETINE HYDROCHLORIDE 60 MG: 30 CAPSULE, DELAYED RELEASE ORAL at 08:04

## 2024-04-08 RX ADMIN — ACETAMINOPHEN 500 MG: 500 TABLET ORAL at 06:04

## 2024-04-08 RX ADMIN — BUPROPION HYDROCHLORIDE 150 MG: 150 TABLET, EXTENDED RELEASE ORAL at 06:04

## 2024-04-08 RX ADMIN — FLUTICASONE FUROATE 1 PUFF: 100 POWDER RESPIRATORY (INHALATION) at 08:04

## 2024-04-08 RX ADMIN — SENNOSIDES AND DOCUSATE SODIUM 2 TABLET: 8.6; 5 TABLET ORAL at 08:04

## 2024-04-08 RX ADMIN — ISOSORBIDE MONONITRATE 60 MG: 60 TABLET, EXTENDED RELEASE ORAL at 08:04

## 2024-04-08 RX ADMIN — PROPRANOLOL HYDROCHLORIDE 10 MG: 10 TABLET ORAL at 02:04

## 2024-04-08 RX ADMIN — INSULIN DETEMIR 52 UNITS: 100 INJECTION, SOLUTION SUBCUTANEOUS at 08:04

## 2024-04-08 RX ADMIN — PROPRANOLOL HYDROCHLORIDE 10 MG: 10 TABLET ORAL at 08:04

## 2024-04-08 RX ADMIN — ASPIRIN 81 MG: 81 TABLET, COATED ORAL at 08:04

## 2024-04-08 RX ADMIN — UMECLIDINIUM BROMIDE AND VILANTEROL TRIFENATATE 1 PUFF: 62.5; 25 POWDER RESPIRATORY (INHALATION) at 08:04

## 2024-04-08 RX ADMIN — MECLIZINE 25 MG: 12.5 TABLET ORAL at 08:04

## 2024-04-08 RX ADMIN — ACETAMINOPHEN 500 MG: 500 TABLET ORAL at 05:04

## 2024-04-08 RX ADMIN — ACETAMINOPHEN 500 MG: 500 TABLET ORAL at 02:04

## 2024-04-08 RX ADMIN — MONTELUKAST SODIUM 10 MG: 5 TABLET, CHEWABLE ORAL at 08:04

## 2024-04-08 NOTE — PT/OT/SLP PROGRESS
"Occupational Therapy   Treatment    Name: Lazara Casillas  MRN: [id number]  Admitting Diagnosis:  Primary osteoarthritis of left shoulder  5 Days Post-Op    Recommendations:     Discharge Recommendations: Moderate Intensity Therapy  Discharge Equipment Recommendations:   (hemiwalker)  Barriers to discharge:  Inaccessible home environment    Assessment:     Lazara Casillas is a 73 y.o. female with a medical diagnosis of Primary osteoarthritis of left shoulder. Performance deficits affecting function are weakness, impaired endurance, impaired self care skills, impaired functional mobility, gait instability, impaired balance, decreased coordination, pain, decreased lower extremity function, decreased upper extremity function, decreased ROM, orthopedic precautions, impaired muscle length, impaired joint extensibility.     Rehab Prognosis:  Good; patient would benefit from acute skilled OT services to address these deficits and reach maximum level of function.       Plan:     Patient to be seen daily to address the above listed problems via self-care/home management, therapeutic activities, therapeutic exercises  Plan of Care Expires: 04/09/24  Plan of Care Reviewed with: patient, spouse    Subjective     Chief Complaint: pain in back  Patient/Family Comments/goals: "My back is killing me."  Pain/Comfort:  Pain Rating 1: other (see comments) (did not give numerical value, but patient reporting her back is "killing" her)  Location - Side 1: Left  Location - Orientation 1: generalized  Location 1: back  Pain Addressed 1: Distraction, Reposition, Cessation of Activity  Pain Rating 2: 8/10  Location - Orientation 2: generalized  Location 2: back  Pain Addressed 2: Distraction, Reposition, Pre-medicate for activity    Objective:     Patient found  in bathroom  with peripheral IV upon OT entry to room.    General Precautions: Standard, fall    Orthopedic Precautions:LUE non weight bearing  Braces: Shoulder abduction " brace  Respiratory Status: Room air     Occupational Performance:     Functional Mobility/Transfers:  Patient completed Toilet Transfer Step Transfer technique with moderate assistance with  hemiwalker  Functional Mobility: Patient performed FM in hallway with CGA and hemiwalker. She was able to tolerate ~60 feet before reporting that she could not go anymore due to back pain. OT inquired if she would be willing to take seated rest break and then perform a WIS transfer, pt declined and said she wanted to stop.    Activities of Daily Living:  Lower Body Dressing: total assistance for threading legs and pulling brief up following toileting  Toileting: total assistance for posterior perineal hygiene, as well as clothing management      Patient left up in chair with all lines intact, call button in reach, and spouse present    GOALS:   Multidisciplinary Problems       Occupational Therapy Goals          Problem: Occupational Therapy    Goal Priority Disciplines Outcome Interventions   Occupational Therapy Goal     OT, PT/OT Ongoing, Progressing    Description: Pt will perform UB dress c max assist using hemitechniques by d/c. MET  Pt will perform LB dress c max using hemitechniques by d/c. MET  Pt will perform toilet t/f using LRAD c mod assist by d/c. MET  Pt will perform toileting c mod assist by d/c.   Pt will perform Walk in shower t/f c max assist by d/c.   Pt will be Ind c HEP and pxns Compliance by d/c.                         Time Tracking:     OT Date of Treatment: 04/08/24  OT Start Time: 1412  OT Stop Time: 1430  OT Total Time (min): 18 min    Billable Minutes:Therapeutic Activity 18    OT/RUSS: OT          4/8/2024

## 2024-04-08 NOTE — PLAN OF CARE
Problem: Occupational Therapy  Goal: Occupational Therapy Goal  Description: Pt will perform UB dress c max assist using hemitechniques by d/c. MET  Pt will perform LB dress c max using hemitechniques by d/c. MET  Pt will perform toilet t/f using LRAD c mod assist by d/c. MET  Pt will perform toileting c mod assist by d/c.   Pt will perform Walk in shower t/f c max assist by d/c.   Pt will be Ind c HEP and pxns Compliance by d/c.    Outcome: Ongoing, Progressing

## 2024-04-08 NOTE — PLAN OF CARE
Pt still requiring min asst to ambulate with multiple LOB. Lawson walker purchased.     MD requesting SNF placement.     F/u with pt &  @ bedside -- discuss dcp options. Pt/hsb agreed to [facility] bed eval. Foc obtained.     Called referral to [facility]. Await auth.

## 2024-04-08 NOTE — PT/OT/SLP PROGRESS
"Occupational Therapy   Treatment    Name: Lazara Casillas  MRN: [id number]  Admitting Diagnosis:  Primary osteoarthritis of left shoulder  5 Days Post-Op    Recommendations:     Discharge Recommendations: Moderate Intensity Therapy  Discharge Equipment Recommendations:   (hemiwalker)  Barriers to discharge:  Inaccessible home environment    Assessment:     Lazara Casillas is a 73 y.o. female with a medical diagnosis of Primary osteoarthritis of left shoulder. Performance deficits affecting function are weakness, impaired endurance, impaired self care skills, impaired functional mobility, impaired balance, gait instability, impaired cognition, decreased upper extremity function, decreased lower extremity function, decreased safety awareness, pain, decreased ROM, impaired coordination, orthopedic precautions, impaired muscle length, impaired joint extensibility.     Rehab Prognosis:  Fair; patient would benefit from acute skilled OT services to address these deficits and reach maximum level of function.       Plan:     Patient to be seen daily to address the above listed problems via self-care/home management, therapeutic activities, therapeutic exercises  Plan of Care Expires: 04/09/24  Plan of Care Reviewed with: patient, spouse    Subjective     Chief Complaint: shoulder and back pain  Patient/Family Comments/goals: "My arm is so stiff."  Pain/Comfort:  Pain Rating 1: 8/10  Location - Side 1: Left  Location - Orientation 1: generalized  Location 1: shoulder  Pain Addressed 1: Distraction, Reposition, Pre-medicate for activity  Pain Rating 2: 8/10  Location - Orientation 2: generalized  Location 2: back  Pain Addressed 2: Distraction, Reposition, Pre-medicate for activity    Objective:     Patient found up in chair with peripheral IV upon OT entry to room.    General Precautions: Standard, fall    Orthopedic Precautions:LUE non weight bearing  Braces: Shoulder abduction brace  Respiratory Status: Room air     Occupational " Performance:     Treatment & Education:  OT reviewed technique for doffing and donning shoulder abduction brace with patient and spouse. Performed distal AROM HEP, 30 reps of each exercise. Patient has improved in her performance with HEP, requiring less cues from OT. Her cognition and orientation to situation appears to be greatly improved compared to sessions last week.    Patient left up in chair with all lines intact, call button in reach, and spouse present    GOALS:   Multidisciplinary Problems       Occupational Therapy Goals          Problem: Occupational Therapy    Goal Priority Disciplines Outcome Interventions   Occupational Therapy Goal     OT, PT/OT Ongoing, Progressing    Description: Pt will perform UB dress c max assist using hemitechniques by d/c. MET  Pt will perform LB dress c max using hemitechniques by d/c. MET  Pt will perform toilet t/f using LRAD c mod assist by d/c.  Pt will perform toileting c mod assist by d/c.   Pt will perform Walk in shower t/f c max assist by d/c.   Pt will be Ind c HEP and pxns Compliance by d/c.                         Time Tracking:     OT Date of Treatment: 04/08/24  OT Start Time: 1112  OT Stop Time: 1135  OT Total Time (min): 23 min    Billable Minutes:Therapeutic Exercise 23    OT/RUSS: OT          4/8/2024

## 2024-04-08 NOTE — PLAN OF CARE
Problem: Adult Inpatient Plan of Care  Goal: Plan of Care Review  Outcome: Ongoing, Progressing  Flowsheets (Taken 4/7/2024 2054)  Plan of Care Reviewed With: patient  Goal: Absence of Hospital-Acquired Illness or Injury  Outcome: Ongoing, Progressing  Intervention: Identify and Manage Fall Risk  Flowsheets (Taken 4/7/2024 2054)  Safety Promotion/Fall Prevention:   assistive device/personal item within reach   instructed to call staff for mobility   nonskid shoes/socks when out of bed   lighting adjusted   medications reviewed     Problem: Pain Acute  Goal: Acceptable Pain Control and Functional Ability  Outcome: Ongoing, Progressing  Intervention: Develop Pain Management Plan  Flowsheets (Taken 4/7/2024 2054)  Pain Management Interventions:   care clustered   pain management plan reviewed with patient/caregiver   quiet environment facilitated   relaxation techniques promoted   position adjusted   pillow support provided     Problem: Fall Injury Risk  Goal: Absence of Fall and Fall-Related Injury  Outcome: Ongoing, Progressing  Intervention: Identify and Manage Contributors  Flowsheets (Taken 4/7/2024 2054)  Self-Care Promotion: safe use of adaptive equipment encouraged  Medication Review/Management: medications reviewed     Problem: Diabetes Comorbidity  Goal: Blood Glucose Level Within Targeted Range  Outcome: Ongoing, Progressing  Intervention: Monitor and Manage Glycemia  Flowsheets (Taken 4/7/2024 2054)  Glycemic Management: blood glucose monitored

## 2024-04-08 NOTE — PROGRESS NOTES
[facility] - Orthopaedics  Orthopedic Surgery  Progress Note:      Patient Name: Lazara Casillas  MRN: [id number]  Admission Date: 4/3/2024  Attending Provider:  Watson Hennessy MD  Hospital Length of Stay:  5 days  Postoperative Day: 5 Days Post-Op      SUBJECTIVE:  Postop day five status post left reverse total shoulder arthroplasty. Patient reports no acute issues overnight and adequate control of pain on current regimen.  Patient worked with physical therapy over the last 24 hours with slow improvements today, able to ambulate 117 ft per PT notes. She continues to require significant assistance with appropriate sequence with hemiwalker.       OBJECTIVE:    Physical Examination:  General: Well-developed, well-nourished.  Neuro: Alert and oriented x 3.  Psych: Normal mood and affect.  Left upper extremity:  Incisions clean, dry, and intact. No signs of infection. Neurovascular intact distally. Sensation intact. Abduction sling in place.    Vitals:    04/08/24 0719 04/08/24 0801 04/08/24 0820 04/08/24 1106   BP: (!) 150/65   (!) 132/55   Pulse: 64  64 63   Resp:   18    Temp: 97.5 °F (36.4 °C)   98.2 °F (36.8 °C)   TempSrc: Oral   Oral   SpO2: (!) 94% (!) 94%  (!) 94%   Weight:       Height:         I/O last 3 completed shifts:  In: 720 [P.O.:720]  Out: 640 [Urine:640]    Medications:  Scheduled Meds:   acetaminophen  500 mg Oral Q4H    aspirin  81 mg Oral BID    buPROPion  150 mg Oral QAM    cariprazine  1.5 mg Oral Daily    doxepin  10 mg Oral QHS    DULoxetine  60 mg Oral QHS    EScitalopram oxalate  20 mg Oral Daily    umeclidinium-vilanteroL  1 puff Inhalation Daily    And    fluticasone furoate  1 puff Inhalation Daily    losartan  50 mg Oral Daily    And    hydroCHLOROthiazide  12.5 mg Oral Daily    insulin detemir U-100  52 Units Subcutaneous QHS    isosorbide mononitrate  60 mg Oral Daily    linaCLOtide  145 mcg Oral Before breakfast    montelukast  10 mg Oral QHS    propranoloL  10 mg Oral  TID    senna-docusate 8.6-50 mg  2 tablet Oral QHS     Continuous Infusions:   sodium chloride 0.9% 75 mL/hr at 04/03/24 1312     PRN Meds:.albuterol, aluminum-magnesium hydroxide-simethicone, baclofen, dextrose 10%, dextrose 10%, diazePAM, glucagon (human recombinant), glucose, glucose, HYDROcodone-acetaminophen, insulin aspart U-100, lactulose, loperamide, meclizine, melatonin, methocarbamoL, ondansetron, tramadol    Significant Labs: All pertinent labs within the past 24 hours have been reviewed.  Recent Lab Results  (Last 5 results in the past 72 hours)        04/08/24  0531   04/07/24  1933   04/07/24  1649   04/07/24  1113   04/07/24  0500        POCT Glucose 102   104   129   117   82                              Significant Imaging:  I have reviewed all pertinent imaging results/findings.  X-Ray Shoulder 2 or More Views Left    Result Date: 4/3/2024  EXAMINATION: XR SHOULDER COMPLETE 2 OR MORE VIEWS LEFT CLINICAL HISTORY: post-op;Strain of muscle(s) and tendon(s) of the rotator cuff of left shoulder, initial encounter TECHNIQUE: Two views COMPARISON: March 5, 2024. FINDINGS: There is interim placement of left shoulder arthroplasty which is in satisfactory position and alignment.     As above. Electronically signed by: Luc Chavez Date:    04/03/2024 Time:    13:37    CT Shoulder Without Contrast Left    Result Date: 3/13/2024  EXAMINATION: CT SHOULDER WITHOUT CONTRAST LEFT CLINICAL HISTORY: Shoulder pain, chronic, osteoarthritis suspected;  Strain of muscle(s) and tendon(s) of the rotator cuff of left shoulder, initial encounter TECHNIQUE: Low-dose noncontrast images of the left shoulder were obtained.  Coronal and sagittal reformats submitted for review. COMPARISON: MRI shoulder 03/11/2024 FINDINGS: There is no fracture, dislocation or destructive osseous lesion.  Acromioclavicular and glenohumeral degenerative arthrosis is stable.  No significant subchondral cystic change.  Muscle bulk and attenuation is  relatively well preserved.  No fatty atrophy.  No discrete full-thickness rotator cuff tear.  No space-occupying mass.  There is no evidence for bursitis.  Biceps tendon is grossly intact. No axillary adenopathy.  The visualized lungs are clear.  There are old healed left-sided rib fractures.  There is aortic and coronary atherosclerosis.  Prior sternotomy.     Degenerative arthrosis of the left glenohumeral and acromioclavicular joint.  No acute fracture or dislocation. Old healed left-sided rib fractures. Electronically signed by: Jose Alfredo Zavala Date:    03/13/2024 Time:    08:30    X-Ray Chest PA And Lateral    Result Date: 3/12/2024  EXAMINATION: XR CHEST PA AND LATERAL CLINICAL HISTORY: Primary osteoarthritis, left shoulder TECHNIQUE: Two views of the chest COMPARISON: 03/02/2020 FINDINGS: No focal opacification, pleural effusion, or pneumothorax. The cardiomediastinal silhouette is within normal limits with postsurgical changes of median sternotomy. No acute osseous abnormality.     No acute cardiopulmonary process. Electronically signed by: Sukhwinder Kern Date:    03/12/2024 Time:    12:23    MRI Shoulder Without Contrast Left    Result Date: 3/12/2024  EXAMINATION: MRI SHOULDER WITHOUT CONTRAST LEFT CLINICAL HISTORY: 73-year-old woman with left shoulder pain.  Strain of muscle(s) and tendon(s) of the rotator cuff of left shoulder, initial encounter TECHNIQUE: Axial T2 fat sat, coronal T2 fat sat, sagittal T2 fat sat, and sagittal T1 non-contrast 1.5T magnetic resonance imaging was performed through the left shoulder per routine protocol. COMPARISON: None.  Correlation is made with left shoulder radiographs of 03/05/2024. FINDINGS: There is moderate supraspinatus and infraspinatus tendinosis, most advanced at the junctional zone of the tendons.  There is mild, low-grade bursal surface fraying across the distal insertional supraspinatus and infraspinatus tendons with no significant associated tendon attenuation  or discrete tendon tear.  The teres minor tendon is intact with mild distal insertional tendinosis.  There is mild subscapularis tendinosis.  There is no muscle edema or atrophy.  There is no abnormal fluid distending the subacromial/subdeltoid bursa.  The proximal long head biceps tendon is intact and normal in location and appearance.  There is a moderate volume of fluid distending the biceps tendon sheath.  The left glenohumeral demonstrates no effusion.  There is mild attenuation and fibrillation of the glenoid and humeral head articular cartilage without focal cartilage defect.  There is no subchondral degenerative marrow change or significant osteophyte formation.  However, there is a sizable osteochondral body at the superior subscapularis recess, measuring 8 mm on sagittal T2 fat sat series 3, image 20.  No additional osteochondral body is identified.  There is mild degenerative fraying along the superior glenoid labrum without discrete labral tear, labral displacement, or paralabral cyst.  The glenoid labrum is otherwise normal.  No pathology is identified along the glenohumeral ligaments.  There is moderate degenerative arthrosis and associated hypertrophy at the AC joint with mild inferior encroachment and no acute pathology at the AC joint.  There is a Bigliani type 2 acromion without os acromiale.  There is mild cortical irregularity at the middle third of the greater tuberosity consistent with the rotator cuff tendinopathy.  There is no significant marrow signal abnormality or osseous lesion.  Alignment at the shoulder is anatomic and there is no acute or chronic fracture.     Moderate supraspinatus and infraspinatus tendinosis with low-grade bursal surface fraying of both distal tendons and no significant rotator cuff tendon attenuation or discrete rotator cuff tear. Mild tendinosis involving the teres minor and subscapularis. Mild proximal long head biceps tenosynovitis suggested. Mild degenerative  change at the glenohumeral joint with osteochondral body at the superior subscapularis recess. Moderate degenerative arthrosis and hypertrophy at the left AC joint with mild inferior encroachment. Electronically signed by: Guero Villar Date:    03/12/2024 Time:    08:20    X-Ray Shoulder 2 or More Views Left    Result Date: 3/5/2024  See Provider Notes for results. IMPRESSION: Please see provider office/clinic notes for radiology interpretation This procedure was auto-finalized by: Virtual Radi[facility]    X-Ray Shoulder 2 or More Views Left  Narrative: EXAMINATION:  XR SHOULDER COMPLETE 2 OR MORE VIEWS LEFT    CLINICAL HISTORY:  post-op;Strain of muscle(s) and tendon(s) of the rotator cuff of left shoulder, initial encounter    TECHNIQUE:  Two views    COMPARISON:  March 5, 2024.    FINDINGS:  There is interim placement of left shoulder arthroplasty which is in satisfactory position and alignment.  Impression: As above.    Electronically signed by: Luc Chavez  Date:    04/03/2024  Time:    13:37         ASSESSMENT/PLAN: 73 y.o. female 5 Days Post-Op s/p left reverse total shoulder arthroplasty  -Continue with current pain control regimen  -Continue with DVT prophylaxis  -Continue with current physical therapy and non weightbearing to left upper extremity  -She continues to make slow improvements per PT, but still requiring significant assistance during ambulation.  Discussed possible discharge home with home health versus rehab/SNF placement.  Disposition pending.    OSCAR Bal  Orthopedic Surgery  [facility] - Orthopaedics

## 2024-04-08 NOTE — PT/OT/SLP PROGRESS
Physical Therapy Treatment    Patient Name:  Lazara Casillas   MRN:  [id number]    Recommendations:     Discharge Recommendations: Moderate Intensity Therapy  Discharge Equipment Recommendations: cane, quad (vs. hemiwalker)  Barriers to discharge:  impaired mobility    Assessment:     Lazara Casillas is a 73 y.o. female admitted with a medical diagnosis of Primary osteoarthritis of left shoulder.  She presents with the following impairments/functional limitations: weakness, impaired endurance, impaired sensation, impaired functional mobility, gait instability, impaired balance .    Rehab Prognosis: Good; patient would benefit from acute skilled PT services to address these deficits and reach maximum level of function.    Recent Surgery: Procedure(s) (LRB):  ARTHROPLASTY, SHOULDER, TOTAL, REVERSE (Left) 5 Days Post-Op    Plan:     During this hospitalization, patient to be seen daily (QD-BID) to address the identified rehab impairments via gait training, therapeutic activities, therapeutic exercises and progress toward the following goals:    Plan of Care Expires:  04/09/24    Subjective     Chief Complaint: pt stated she feels dizzy  Patient/Family Comments/goals: n/a  Pain/Comfort:         Objective:     Communicated with rn prior to session.  Patient found supine with   upon PT entry to room.     General Precautions: Standard, fall  Orthopedic Precautions: LUE non weight bearing  Braces: Shoulder abduction brace  Respiratory Status: Room air     Functional Mobility:  Bed Mobility:     Supine to Sit: stand by assistance  Sit to Supine: moderate assistance, required assistance w/bilateral LE  Transfers:     Sit to Stand:  minimum assistance with hemiwalker  Bed to Chair: contact guard assistance with  hemiwalker  using  Step Transfer  Gait: pt amb 117ft w/ vinod walker and min assist for stability.        Treatment & Education:  Pt requires vcv throughout entire amb for proper sequence with hemiwalker. Pt amb with decrease  speed.     Patient left supine with all lines intact and call button in reach..    GOALS:   Multidisciplinary Problems       Physical Therapy Goals          Problem: Physical Therapy    Goal Priority Disciplines Outcome Goal Variances Interventions   Physical Therapy Goal     PT, PT/OT Ongoing, Progressing     Description: Ambulate 50 ft. Using LRAD with CGA  5 Stairs: using LRAD with CGA-met  1 Step: using LRAD with CGA                       Time Tracking:     PT Received On:    PT Start Time: 0901     PT Stop Time: 0924  PT Total Time (min): 23 min     Billable Minutes: Gait Training 13 and Therapeutic Activity 10    Treatment Type: Treatment  PT/PTA: PTA     Number of PTA visits since last PT visit: 1 04/08/2024

## 2024-04-09 ENCOUNTER — HOSPITAL ENCOUNTER (INPATIENT)
Facility: HOSPITAL | Age: 74
LOS: 8 days | Discharge: HOME HEALTH | DRG: 950 | End: 2024-04-17
Attending: INTERNAL MEDICINE | Admitting: INTERNAL MEDICINE
Payer: MEDICARE

## 2024-04-09 VITALS
DIASTOLIC BLOOD PRESSURE: 78 MMHG | RESPIRATION RATE: 18 BRPM | OXYGEN SATURATION: 93 % | SYSTOLIC BLOOD PRESSURE: 145 MMHG | HEIGHT: 65 IN | WEIGHT: 206 LBS | TEMPERATURE: 99 F | HEART RATE: 64 BPM | BODY MASS INDEX: 34.32 KG/M2

## 2024-04-09 DIAGNOSIS — S46.012A TRAUMATIC INCOMPLETE TEAR OF LEFT ROTATOR CUFF, INITIAL ENCOUNTER: ICD-10-CM

## 2024-04-09 DIAGNOSIS — J44.9 CHRONIC OBSTRUCTIVE PULMONARY DISEASE, UNSPECIFIED COPD TYPE: Primary | ICD-10-CM

## 2024-04-09 DIAGNOSIS — Z86.79 HISTORY OF CORONARY ARTERY DISEASE: ICD-10-CM

## 2024-04-09 DIAGNOSIS — M19.012 PRIMARY OSTEOARTHRITIS OF LEFT SHOULDER: ICD-10-CM

## 2024-04-09 DIAGNOSIS — Z98.890 S/P ROTATOR CUFF REPAIR: ICD-10-CM

## 2024-04-09 DIAGNOSIS — R29.6 FALLS FREQUENTLY: ICD-10-CM

## 2024-04-09 LAB
POCT GLUCOSE: 104 MG/DL (ref 70–110)
POCT GLUCOSE: 110 MG/DL (ref 70–110)
POCT GLUCOSE: 116 MG/DL (ref 70–110)

## 2024-04-09 PROCEDURE — 25000003 PHARM REV CODE 250: Performed by: INTERNAL MEDICINE

## 2024-04-09 PROCEDURE — 63600175 PHARM REV CODE 636 W HCPCS: Performed by: INTERNAL MEDICINE

## 2024-04-09 PROCEDURE — 94761 N-INVAS EAR/PLS OXIMETRY MLT: CPT

## 2024-04-09 PROCEDURE — 25000003 PHARM REV CODE 250: Performed by: NURSE PRACTITIONER

## 2024-04-09 PROCEDURE — 94799 UNLISTED PULMONARY SVC/PX: CPT | Mod: XB

## 2024-04-09 PROCEDURE — 25000003 PHARM REV CODE 250

## 2024-04-09 PROCEDURE — 99900035 HC TECH TIME PER 15 MIN (STAT)

## 2024-04-09 PROCEDURE — 97162 PT EVAL MOD COMPLEX 30 MIN: CPT

## 2024-04-09 PROCEDURE — 99900031 HC PATIENT EDUCATION (STAT)

## 2024-04-09 PROCEDURE — 25000003 PHARM REV CODE 250: Performed by: ORTHOPAEDIC SURGERY

## 2024-04-09 PROCEDURE — 11000004 HC SNF PRIVATE

## 2024-04-09 RX ORDER — LOSARTAN POTASSIUM 25 MG/1
50 TABLET ORAL DAILY
Status: DISCONTINUED | OUTPATIENT
Start: 2024-04-10 | End: 2024-04-17 | Stop reason: HOSPADM

## 2024-04-09 RX ORDER — TRAMADOL HYDROCHLORIDE 50 MG/1
50 TABLET ORAL EVERY 4 HOURS PRN
Status: DISCONTINUED | OUTPATIENT
Start: 2024-04-09 | End: 2024-04-09

## 2024-04-09 RX ORDER — POLYETHYLENE GLYCOL 3350 17 G/17G
17 POWDER, FOR SOLUTION ORAL DAILY PRN
Status: DISCONTINUED | OUTPATIENT
Start: 2024-04-09 | End: 2024-04-09

## 2024-04-09 RX ORDER — CEFADROXIL 500 MG/1
500 CAPSULE ORAL EVERY 12 HOURS
Status: CANCELLED | OUTPATIENT
Start: 2024-04-09

## 2024-04-09 RX ORDER — GLUCAGON 1 MG
1 KIT INJECTION
Status: CANCELLED | OUTPATIENT
Start: 2024-04-09

## 2024-04-09 RX ORDER — IBUPROFEN 200 MG
24 TABLET ORAL
Status: DISCONTINUED | OUTPATIENT
Start: 2024-04-09 | End: 2024-04-17 | Stop reason: HOSPADM

## 2024-04-09 RX ORDER — IPRATROPIUM BROMIDE AND ALBUTEROL SULFATE 2.5; .5 MG/3ML; MG/3ML
3 SOLUTION RESPIRATORY (INHALATION) EVERY 6 HOURS PRN
COMMUNITY

## 2024-04-09 RX ORDER — ACETAMINOPHEN 500 MG
500 TABLET ORAL EVERY 6 HOURS PRN
Status: DISCONTINUED | OUTPATIENT
Start: 2024-04-09 | End: 2024-04-17 | Stop reason: HOSPADM

## 2024-04-09 RX ORDER — HYDROCHLOROTHIAZIDE 12.5 MG/1
12.5 TABLET ORAL DAILY
Status: DISCONTINUED | OUTPATIENT
Start: 2024-04-10 | End: 2024-04-17 | Stop reason: HOSPADM

## 2024-04-09 RX ORDER — ASPIRIN 81 MG/1
81 TABLET ORAL 2 TIMES DAILY
Status: DISCONTINUED | OUTPATIENT
Start: 2024-04-09 | End: 2024-04-17 | Stop reason: HOSPADM

## 2024-04-09 RX ORDER — AMOXICILLIN 250 MG
1 CAPSULE ORAL NIGHTLY
Status: DISCONTINUED | OUTPATIENT
Start: 2024-04-09 | End: 2024-04-09

## 2024-04-09 RX ORDER — GLUCAGON 1 MG
1 KIT INJECTION
Status: DISCONTINUED | OUTPATIENT
Start: 2024-04-09 | End: 2024-04-17 | Stop reason: HOSPADM

## 2024-04-09 RX ORDER — POLYETHYLENE GLYCOL 3350 17 G/17G
17 POWDER, FOR SOLUTION ORAL 2 TIMES DAILY PRN
Status: DISCONTINUED | OUTPATIENT
Start: 2024-04-09 | End: 2024-04-11

## 2024-04-09 RX ORDER — ONDANSETRON HYDROCHLORIDE 2 MG/ML
4 INJECTION, SOLUTION INTRAVENOUS EVERY 6 HOURS PRN
Status: DISCONTINUED | OUTPATIENT
Start: 2024-04-09 | End: 2024-04-17 | Stop reason: HOSPADM

## 2024-04-09 RX ORDER — TRAMADOL HYDROCHLORIDE 50 MG/1
50 TABLET ORAL EVERY 4 HOURS PRN
Status: CANCELLED | OUTPATIENT
Start: 2024-04-09

## 2024-04-09 RX ORDER — PROPRANOLOL HYDROCHLORIDE 10 MG/1
10 TABLET ORAL 3 TIMES DAILY
Status: DISCONTINUED | OUTPATIENT
Start: 2024-04-09 | End: 2024-04-17 | Stop reason: HOSPADM

## 2024-04-09 RX ORDER — METHOCARBAMOL 500 MG/1
500 TABLET, FILM COATED ORAL EVERY 8 HOURS PRN
Status: DISCONTINUED | OUTPATIENT
Start: 2024-04-09 | End: 2024-04-17 | Stop reason: HOSPADM

## 2024-04-09 RX ORDER — ALBUTEROL SULFATE 0.63 MG/3ML
0.63 SOLUTION RESPIRATORY (INHALATION) EVERY 6 HOURS PRN
Status: DISCONTINUED | OUTPATIENT
Start: 2024-04-09 | End: 2024-04-17

## 2024-04-09 RX ORDER — TRAMADOL HYDROCHLORIDE 50 MG/1
50 TABLET ORAL EVERY 4 HOURS PRN
Status: DISCONTINUED | OUTPATIENT
Start: 2024-04-09 | End: 2024-04-17 | Stop reason: HOSPADM

## 2024-04-09 RX ORDER — BUPROPION HYDROCHLORIDE 150 MG/1
150 TABLET, EXTENDED RELEASE ORAL EVERY MORNING
Status: DISCONTINUED | OUTPATIENT
Start: 2024-04-09 | End: 2024-04-11

## 2024-04-09 RX ORDER — MECLIZINE HCL 12.5 MG 12.5 MG/1
25 TABLET ORAL 3 TIMES DAILY PRN
Status: DISCONTINUED | OUTPATIENT
Start: 2024-04-09 | End: 2024-04-17 | Stop reason: HOSPADM

## 2024-04-09 RX ORDER — CEFADROXIL 500 MG/1
500 CAPSULE ORAL EVERY 12 HOURS
Status: COMPLETED | OUTPATIENT
Start: 2024-04-09 | End: 2024-04-12

## 2024-04-09 RX ORDER — ACETAMINOPHEN 325 MG/1
650 TABLET ORAL EVERY 6 HOURS PRN
Status: DISCONTINUED | OUTPATIENT
Start: 2024-04-09 | End: 2024-04-17 | Stop reason: HOSPADM

## 2024-04-09 RX ORDER — DULOXETIN HYDROCHLORIDE 30 MG/1
60 CAPSULE, DELAYED RELEASE ORAL NIGHTLY
Status: DISCONTINUED | OUTPATIENT
Start: 2024-04-09 | End: 2024-04-17 | Stop reason: HOSPADM

## 2024-04-09 RX ORDER — FLUTICASONE FUROATE AND VILANTEROL 100; 25 UG/1; UG/1
1 POWDER RESPIRATORY (INHALATION) DAILY
Status: DISCONTINUED | OUTPATIENT
Start: 2024-04-09 | End: 2024-04-17 | Stop reason: HOSPADM

## 2024-04-09 RX ORDER — INSULIN ASPART 100 [IU]/ML
1-10 INJECTION, SOLUTION INTRAVENOUS; SUBCUTANEOUS
Status: CANCELLED | OUTPATIENT
Start: 2024-04-09

## 2024-04-09 RX ORDER — ALUMINUM HYDROXIDE, MAGNESIUM HYDROXIDE, AND SIMETHICONE 1200; 120; 1200 MG/30ML; MG/30ML; MG/30ML
30 SUSPENSION ORAL EVERY 6 HOURS PRN
Status: DISCONTINUED | OUTPATIENT
Start: 2024-04-09 | End: 2024-04-17 | Stop reason: HOSPADM

## 2024-04-09 RX ORDER — ISOSORBIDE MONONITRATE 30 MG/1
60 TABLET, EXTENDED RELEASE ORAL DAILY
Status: DISCONTINUED | OUTPATIENT
Start: 2024-04-10 | End: 2024-04-17 | Stop reason: HOSPADM

## 2024-04-09 RX ORDER — ACETAMINOPHEN 500 MG
500 TABLET ORAL
Status: DISCONTINUED | OUTPATIENT
Start: 2024-04-09 | End: 2024-04-10

## 2024-04-09 RX ORDER — MONTELUKAST SODIUM 10 MG/1
10 TABLET ORAL NIGHTLY
Status: DISCONTINUED | OUTPATIENT
Start: 2024-04-09 | End: 2024-04-17 | Stop reason: HOSPADM

## 2024-04-09 RX ORDER — AMOXICILLIN 250 MG
1 CAPSULE ORAL 2 TIMES DAILY
Status: DISCONTINUED | OUTPATIENT
Start: 2024-04-10 | End: 2024-04-11

## 2024-04-09 RX ORDER — TALC
6 POWDER (GRAM) TOPICAL NIGHTLY PRN
Status: DISCONTINUED | OUTPATIENT
Start: 2024-04-09 | End: 2024-04-17 | Stop reason: HOSPADM

## 2024-04-09 RX ORDER — AMOXICILLIN 250 MG
2 CAPSULE ORAL NIGHTLY
Status: CANCELLED | OUTPATIENT
Start: 2024-04-09

## 2024-04-09 RX ORDER — AMOXICILLIN 250 MG
1 CAPSULE ORAL NIGHTLY PRN
Status: DISCONTINUED | OUTPATIENT
Start: 2024-04-09 | End: 2024-04-09

## 2024-04-09 RX ORDER — POLYETHYLENE GLYCOL 3350 17 G/17G
17 POWDER, FOR SOLUTION ORAL DAILY
Status: CANCELLED | OUTPATIENT
Start: 2024-04-09

## 2024-04-09 RX ORDER — IBUPROFEN 200 MG
24 TABLET ORAL
Status: CANCELLED | OUTPATIENT
Start: 2024-04-09

## 2024-04-09 RX ORDER — IBUPROFEN 200 MG
16 TABLET ORAL
Status: CANCELLED | OUTPATIENT
Start: 2024-04-09

## 2024-04-09 RX ORDER — DIAZEPAM 10 MG/1
10 TABLET ORAL 2 TIMES DAILY PRN
Status: DISCONTINUED | OUTPATIENT
Start: 2024-04-09 | End: 2024-04-17 | Stop reason: HOSPADM

## 2024-04-09 RX ORDER — MUPIROCIN 20 MG/G
OINTMENT TOPICAL 2 TIMES DAILY
Status: COMPLETED | OUTPATIENT
Start: 2024-04-09 | End: 2024-04-13

## 2024-04-09 RX ORDER — INSULIN ASPART 100 [IU]/ML
1-10 INJECTION, SOLUTION INTRAVENOUS; SUBCUTANEOUS
Status: DISCONTINUED | OUTPATIENT
Start: 2024-04-09 | End: 2024-04-17 | Stop reason: HOSPADM

## 2024-04-09 RX ORDER — METHOCARBAMOL 500 MG/1
500 TABLET, FILM COATED ORAL EVERY 8 HOURS PRN
Status: CANCELLED | OUTPATIENT
Start: 2024-04-09

## 2024-04-09 RX ORDER — ESCITALOPRAM OXALATE 10 MG/1
20 TABLET ORAL DAILY
Status: DISCONTINUED | OUTPATIENT
Start: 2024-04-10 | End: 2024-04-17 | Stop reason: HOSPADM

## 2024-04-09 RX ORDER — ACETAMINOPHEN 500 MG
500 TABLET ORAL
Status: CANCELLED | OUTPATIENT
Start: 2024-04-09

## 2024-04-09 RX ORDER — ASPIRIN 81 MG/1
81 TABLET ORAL 2 TIMES DAILY
Status: CANCELLED | OUTPATIENT
Start: 2024-04-09 | End: 2024-05-21

## 2024-04-09 RX ORDER — CEFADROXIL 500 MG/1
500 CAPSULE ORAL EVERY 12 HOURS
Status: DISCONTINUED | OUTPATIENT
Start: 2024-04-09 | End: 2024-04-09 | Stop reason: HOSPADM

## 2024-04-09 RX ORDER — IBUPROFEN 200 MG
16 TABLET ORAL
Status: DISCONTINUED | OUTPATIENT
Start: 2024-04-09 | End: 2024-04-17 | Stop reason: HOSPADM

## 2024-04-09 RX ADMIN — ACETAMINOPHEN 500 MG: 500 TABLET ORAL at 07:04

## 2024-04-09 RX ADMIN — LINACLOTIDE 145 MCG: 145 CAPSULE, GELATIN COATED ORAL at 08:04

## 2024-04-09 RX ADMIN — MUPIROCIN 1 G: 20 OINTMENT TOPICAL at 08:04

## 2024-04-09 RX ADMIN — ACETAMINOPHEN 500 MG: 500 TABLET ORAL at 03:04

## 2024-04-09 RX ADMIN — ACETAMINOPHEN 500 MG: 500 TABLET ORAL at 01:04

## 2024-04-09 RX ADMIN — ACETAMINOPHEN 500 MG: 500 TABLET ORAL at 11:04

## 2024-04-09 RX ADMIN — LOSARTAN POTASSIUM 50 MG: 50 TABLET, FILM COATED ORAL at 09:04

## 2024-04-09 RX ADMIN — CARIPRAZINE 1.5 MG: 1.5 CAPSULE, GELATIN COATED ORAL at 09:04

## 2024-04-09 RX ADMIN — DIAZEPAM 10 MG: 10 TABLET ORAL at 06:04

## 2024-04-09 RX ADMIN — ASPIRIN 81 MG: 81 TABLET, COATED ORAL at 09:04

## 2024-04-09 RX ADMIN — CEFADROXIL 500 MG: 500 CAPSULE ORAL at 09:04

## 2024-04-09 RX ADMIN — ESCITALOPRAM OXALATE 20 MG: 20 TABLET, FILM COATED ORAL at 09:04

## 2024-04-09 RX ADMIN — HYDROCHLOROTHIAZIDE 12.5 MG: 12.5 TABLET ORAL at 09:04

## 2024-04-09 RX ADMIN — BUPROPION HYDROCHLORIDE 150 MG: 150 TABLET, EXTENDED RELEASE ORAL at 06:04

## 2024-04-09 RX ADMIN — MONTELUKAST 10 MG: 10 TABLET, FILM COATED ORAL at 08:04

## 2024-04-09 RX ADMIN — PROPRANOLOL HYDROCHLORIDE 10 MG: 10 TABLET ORAL at 09:04

## 2024-04-09 RX ADMIN — MUPIROCIN 1 G: 20 OINTMENT TOPICAL at 12:04

## 2024-04-09 RX ADMIN — ISOSORBIDE MONONITRATE 60 MG: 60 TABLET, EXTENDED RELEASE ORAL at 09:04

## 2024-04-09 RX ADMIN — CEFADROXIL 500 MG: 500 CAPSULE ORAL at 08:04

## 2024-04-09 RX ADMIN — ACETAMINOPHEN 500 MG: 500 TABLET ORAL at 06:04

## 2024-04-09 RX ADMIN — SENNOSIDES AND DOCUSATE SODIUM 1 TABLET: 50; 8.6 TABLET ORAL at 03:04

## 2024-04-09 RX ADMIN — ASPIRIN 81 MG: 81 TABLET, COATED ORAL at 08:04

## 2024-04-09 RX ADMIN — POLYETHYLENE GLYCOL 3350 17 G: 17 POWDER, FOR SOLUTION ORAL at 08:04

## 2024-04-09 RX ADMIN — PROPRANOLOL HYDROCHLORIDE 10 MG: 10 TABLET ORAL at 08:04

## 2024-04-09 RX ADMIN — ACETAMINOPHEN 500 MG: 500 TABLET ORAL at 12:04

## 2024-04-09 RX ADMIN — TRAMADOL HYDROCHLORIDE 50 MG: 50 TABLET, COATED ORAL at 01:04

## 2024-04-09 RX ADMIN — METHOCARBAMOL TABLETS 500 MG: 500 TABLET, COATED ORAL at 08:04

## 2024-04-09 RX ADMIN — INSULIN DETEMIR 52 UNITS: 100 INJECTION, SOLUTION SUBCUTANEOUS at 08:04

## 2024-04-09 RX ADMIN — DULOXETINE HYDROCHLORIDE 60 MG: 30 CAPSULE, DELAYED RELEASE ORAL at 08:04

## 2024-04-09 RX ADMIN — PROPRANOLOL HYDROCHLORIDE 10 MG: 10 TABLET ORAL at 03:04

## 2024-04-09 NOTE — H&P
[facility] - Medical Surgical Unit  [facility] Medicine  History & Physical    Patient Name: Lazara Casillas  MRN: [id number]  Patient Class: IP- Swing  Admission Date: 4/9/2024  Attending Physician: Gris Bingham DO   Primary Care Provider: Stacey, Primary Doctor         Patient information was obtained from patient, spouse/SO, and ER records.     Subjective:     Principal Problem:S/P rotator cuff repair    Chief Complaint:   Chief Complaint   Patient presents with    rehab for left shoulder s/p rotator cuff repair.        HPI: 74 yo CF admitted to swing bed on 4/9/24 for PT/OT s/p elective left reverse total shoulder arthroplasty on 4/4/24 by Dr. Watson Hennessy. PMH includes HTN, DMT2, anxiety, depression, frequent falls, constipation, allergies, COPD. Pt's  is at bedside. Medications reviewed with the pt. She reports that she was falling frequently at home due to weakness in her legs. She also has chronic dizziness/imbalance for which she takes meclezine 1-2 times daily. She last fell about 2 months ago when she injured her left shoulder. She currently denies SOB, CP, abd pain. Endorses b/l leg weakness and discomfort, along with left shoulder pain and decreased ROM.     Past Medical History:   Diagnosis Date    Anxiety disorder, unspecified     COPD (chronic obstructive pulmonary disease)     Depression     Diabetes mellitus, type 2     Hypertension     YE (obstructive sleep apnea)     Diagnosed years ago-does not sleep with c-pap       Past Surgical History:   Procedure Laterality Date    CHOLECYSTECTOMY      COLONOSCOPY      CORONARY ARTERY BYPASS GRAFT  2017    HYSTERECTOMY      REVERSE TOTAL SHOULDER ARTHROPLASTY Left 4/3/2024    Procedure: ARTHROPLASTY, SHOULDER, TOTAL, REVERSE;  Surgeon: Watson Hennessy MD;  Location: [facility];  Service: Orthopedics;  Laterality: Left;    ROTATOR CUFF REPAIR Right 03/27/2017       Review of patient's allergies indicates:   Allergen Reactions    Sulfa (sulfonamide  antibiotics)        Current Facility-Administered Medications on File Prior to Encounter   Medication    [DISCONTINUED] 0.9%  NaCl infusion    [DISCONTINUED] acetaminophen tablet 500 mg    [DISCONTINUED] albuterol nebulizer solution 0.63 mg    [DISCONTINUED] aluminum-magnesium hydroxide-simethicone 200-200-20 mg/5 mL suspension 30 mL    [DISCONTINUED] aspirin EC tablet 81 mg    [DISCONTINUED] baclofen tablet 10 mg    [DISCONTINUED] buPROPion TBSR 12 hr tablet 150 mg    [DISCONTINUED] cariprazine Cap 1.5 mg    [DISCONTINUED] cefadroxil capsule 500 mg    [DISCONTINUED] dextrose 10% bolus 125 mL 125 mL    [DISCONTINUED] dextrose 10% bolus 250 mL 250 mL    [DISCONTINUED] diazePAM tablet 10 mg    [DISCONTINUED] doxepin capsule 10 mg    [DISCONTINUED] DULoxetine DR capsule 60 mg    [DISCONTINUED] EScitalopram oxalate tablet 20 mg    [DISCONTINUED] fluticasone furoate 100 mcg/actuation inhaler 1 puff    [DISCONTINUED] glucagon (human recombinant) injection 1 mg    [DISCONTINUED] glucose chewable tablet 16 g    [DISCONTINUED] glucose chewable tablet 24 g    [DISCONTINUED] hydroCHLOROthiazide tablet 12.5 mg    [DISCONTINUED] HYDROcodone-acetaminophen 5-325 mg per tablet 1 tablet    [DISCONTINUED] insulin aspart U-100 injection 1-10 Units    [DISCONTINUED] insulin detemir U-100 injection 52 Units    [DISCONTINUED] isosorbide mononitrate 24 hr tablet 60 mg    [DISCONTINUED] lactulose 20 gram/30 mL solution Soln 20 g    [DISCONTINUED] linaCLOtide capsule 145 mcg    [DISCONTINUED] loperamide capsule 2 mg    [DISCONTINUED] losartan tablet 50 mg    [DISCONTINUED] meclizine tablet 25 mg    [DISCONTINUED] melatonin tablet 6 mg    [DISCONTINUED] methocarbamoL tablet 750 mg    [DISCONTINUED] montelukast chewable tablet 10 mg    [DISCONTINUED] ondansetron injection 4 mg    [DISCONTINUED] propranoloL tablet 10 mg    [DISCONTINUED] senna-docusate 8.6-50 mg per tablet 2 tablet    [DISCONTINUED] traMADoL tablet 50 mg    [DISCONTINUED]  "umeclidinium-vilanteroL 62.5-25 mcg/actuation DsDv 1 puff     Current Outpatient Medications on File Prior to Encounter   Medication Sig    buPROPion (WELLBUTRIN SR) 150 MG TBSR 12 hr tablet Take 150 mg by mouth every morning.    doxepin (SINEQUAN) 10 MG capsule Take 10 mg by mouth every evening.    DULoxetine (CYMBALTA) 60 MG capsule Take 60 mg by mouth nightly. For restless legs    EScitalopram oxalate (LEXAPRO) 20 MG tablet Take 20 mg by mouth once daily.    insulin (BASAGLAR KWIKPEN U-100 INSULIN) glargine 100 units/mL SubQ pen Inject 52 Units into the skin every evening.    isosorbide mononitrate (IMDUR) 60 MG 24 hr tablet Take 60 mg by mouth once daily.    linaCLOtide (LINZESS) 145 mcg Cap capsule Take 145 mcg by mouth before breakfast.    losartan-hydrochlorothiazide 50-12.5 mg (HYZAAR) 50-12.5 mg per tablet Take 1 tablet by mouth once daily.    montelukast (SINGULAIR) 10 mg tablet Take 10 mg by mouth every evening.    propranoloL (INDERAL) 10 MG tablet Take 10 mg by mouth 3 (three) times daily.    VRAYLAR 1.5 mg Cap Take 1.5 mg by mouth once daily.    albuterol (ACCUNEB) 0.63 mg/3 mL Nebu Take 0.63 mg by nebulization every 6 (six) hours as needed. Rescue    BD KATY 2ND GEN PEN NEEDLE 32 gauge x 5/32" Ndle SMARTSIG:injection 3 Times Daily    cetirizine 10 mg chewable tablet Take 10 mg by mouth once daily.    diclofenac sodium (VOLTAREN) 1 % Gel daily as needed.    fluticasone propionate (FLONASE) 50 mcg/actuation nasal spray 1 spray by Each Nostril route 2 (two) times daily.    fluticasone-umeclidin-vilanter (TRELEGY ELLIPTA) 100-62.5-25 mcg DsDv Inhale 1 puff into the lungs once daily.    meclizine (ANTIVERT) 25 mg tablet Take 25 mg by mouth 3 (three) times daily as needed for Dizziness.    nitroGLYCERIN (NITROSTAT) 0.4 MG SL tablet Place 0.4 mg under the tongue every 5 (five) minutes as needed for Chest pain.    ondansetron (ZOFRAN-ODT) 8 MG TbDL Take 8 mg by mouth 3 (three) times daily as needed.    " ONETOUCH ULTRA TEST Strp 3 (three) times daily.    potassium chloride (KLOR-CON) 8 MEQ TbSR Take 8 mEq by mouth once.    [DISCONTINUED] aspirin 81 MG Chew Take 1 tablet (81 mg total) by mouth 2 (two) times a day. Blood clot prevention    [DISCONTINUED] baclofen (LIORESAL) 10 MG tablet Take 10 mg by mouth 3 (three) times daily as needed.    [DISCONTINUED] cefadroxil (DURICEF) 500 MG Cap Take 1 capsule (500 mg total) by mouth every 12 (twelve) hours. for 7 days    [DISCONTINUED] diazePAM (VALIUM) 10 MG Tab Take 10 mg by mouth 2 (two) times daily as needed.    [DISCONTINUED] HYDROcodone-acetaminophen (NORCO) 5-325 mg per tablet Take 1 tablet by mouth every 4 (four) hours as needed for Pain.    [DISCONTINUED] polyethylene glycol (GLYCOLAX) 17 gram PwPk Take 17 g by mouth 2 (two) times daily. Constipation PREVENTION for 14 days     Family History       Problem Relation (Age of Onset)    No Known Problems Mother, Father          Tobacco Use    Smoking status: Former     Current packs/day: 0.00     Types: Cigarettes     Quit date:      Years since quittin.2    Smokeless tobacco: Never   Substance and Sexual Activity    Alcohol use: Never    Drug use: Never    Sexual activity: Not on file     Review of Systems   Constitutional:  Positive for activity change and fatigue. Negative for appetite change and fever.   Respiratory:  Negative for chest tightness and shortness of breath.    Cardiovascular:  Negative for chest pain and leg swelling.   Gastrointestinal:  Negative for abdominal distention, abdominal pain, constipation, diarrhea, nausea and vomiting.   Musculoskeletal:  Positive for arthralgias and myalgias.   Skin:  Negative for rash and wound.   Neurological:  Positive for dizziness and weakness.     Objective:     Vital Signs (Most Recent):  Temp: 98.6 °F (37 °C) (24 110)  Pulse: 69 (24 110)  Resp: 20 (24 1349)  BP: (!) 153/70 (24 110)  SpO2: (!) 93 % (24 110) Vital Signs  (24h Range):  Temp:  [98 °F (36.7 °C)-98.7 °F (37.1 °C)] 98.6 °F (37 °C)  Pulse:  [56-69] 69  Resp:  [18-20] 20  SpO2:  [93 %-98 %] 93 %  BP: (145-173)/(70-79) 153/70     Weight: 93.5 kg (206 lb 2.1 oz)  Body mass index is 34.3 kg/m².     Physical Exam  Vitals and nursing note reviewed.   Constitutional:       General: She is not in acute distress.     Appearance: Normal appearance.   HENT:      Head: Normocephalic and atraumatic.      Nose: Nose normal.   Eyes:      Conjunctiva/sclera: Conjunctivae normal.   Cardiovascular:      Rate and Rhythm: Normal rate and regular rhythm.      Pulses: Normal pulses.      Heart sounds: Normal heart sounds. No murmur heard.     No gallop.   Pulmonary:      Effort: Pulmonary effort is normal.      Breath sounds: Normal breath sounds. No wheezing, rhonchi or rales.   Abdominal:      General: Bowel sounds are normal. There is no distension.      Palpations: Abdomen is soft.      Tenderness: There is no abdominal tenderness. There is no guarding.   Musculoskeletal:         General: No deformity.      Cervical back: Normal range of motion and neck supple.      Right lower leg: No edema.      Left lower leg: No edema.      Comments: Decreased ROM left shoulder     Skin:     General: Skin is warm and dry.      Findings: No rash.   Neurological:      General: No focal deficit present.      Mental Status: She is alert and oriented to person, place, and time. Mental status is at baseline.      Gait: Gait normal.   Psychiatric:         Mood and Affect: Mood normal.         Thought Content: Thought content normal.         Judgment: Judgment normal.                Significant Labs: All pertinent labs within the past 24 hours have been reviewed.  Recent Lab Results         04/09/24  1101   04/09/24  0612   04/08/24  2042   04/08/24  1600        POC Glucose       132       POCT Glucose 110   116   122                 Significant Imaging: I have reviewed all pertinent imaging results/findings  within the past 24 hours.  Assessment/Plan:     * S/P rotator cuff repair  Admit to swing bed.  PT/OT  Pain control.  ISS  Home/transfer medications reconciled.        Falls frequently  PT/OT      Hypertension  Chronic, controlled. Latest blood pressure and vitals reviewed-     Temp:  [98 °F (36.7 °C)-98.7 °F (37.1 °C)]   Pulse:  [56-69]   Resp:  [18-20]   BP: (145-173)/(70-79)   SpO2:  [93 %-98 %] .   Home meds for hypertension were reviewed and noted below.   Hypertension Medications               isosorbide mononitrate (IMDUR) 60 MG 24 hr tablet Take 60 mg by mouth once daily.    losartan-hydrochlorothiazide 50-12.5 mg (HYZAAR) 50-12.5 mg per tablet Take 1 tablet by mouth once daily.    nitroGLYCERIN (NITROSTAT) 0.4 MG SL tablet Place 0.4 mg under the tongue every 5 (five) minutes as needed for Chest pain.    propranoloL (INDERAL) 10 MG tablet Take 10 mg by mouth 3 (three) times daily.            While in the hospital, will manage blood pressure as follows; Continue home antihypertensive regimen    Will utilize p.r.n. blood pressure medication only if patient's blood pressure greater than 160/100 and she develops symptoms such as worsening chest pain or shortness of breath.    Diabetes mellitus, type 2  Patient's FSGs are controlled on current medication regimen.  Last A1c reviewed-   Lab Results   Component Value Date    HGBA1C 5.2 03/12/2024     Most recent fingerstick glucose reviewed-   Recent Labs   Lab 04/08/24  2042 04/09/24  0612 04/09/24  1101   POCTGLUCOSE 122* 116* 110     Current correctional scale  moderate  Maintain anti-hyperglycemic dose as follows-   Antihyperglycemics (From admission, onward)      Start     Stop Route Frequency Ordered    04/09/24 2100  insulin detemir U-100 injection 52 Units         -- SubQ Nightly 04/09/24 1256    04/09/24 1107  insulin aspart U-100 injection 1-10 Units         -- SubQ Before meals & nightly PRN 04/09/24 1107          Hold Oral hypoglycemics while patient is  in the hospital.    Depression  Home meds reconciled.        COPD (chronic obstructive pulmonary disease)  Home meds reconciled.    Anxiety disorder, unspecified  Home meds reconciled.      Traumatic incomplete tear of left rotator cuff        History of coronary artery disease        Primary osteoarthritis of left shoulder          VTE Risk Mitigation (From admission, onward)           Ordered     Place JOE hose  Until discontinued        Comments: Remove at least daily for skin assessment    04/09/24 7444                                    SKYE COLEMAN DO  Department of Hospital Medicine  [facility] - Medical Surgical Unit

## 2024-04-09 NOTE — PLAN OF CARE
Problem: Adult Inpatient Plan of Care  Goal: Plan of Care Review  Outcome: Met  Goal: Patient-Specific Goal (Individualized)  Outcome: Met  Goal: Absence of Hospital-Acquired Illness or Injury  Outcome: Met  Goal: Optimal Comfort and Wellbeing  Outcome: Met  Goal: Readiness for Transition of Care  Outcome: Met     Problem: Infection  Goal: Absence of Infection Signs and Symptoms  Outcome: Met     Problem: Pain Acute  Goal: Acceptable Pain Control and Functional Ability  Outcome: Met     Problem: Fall Injury Risk  Goal: Absence of Fall and Fall-Related Injury  Outcome: Met     Problem: COPD (Chronic Obstructive Pulmonary Disease) Comorbidity  Goal: Maintenance of COPD Symptom Control  Outcome: Met     Problem: Hypertension Comorbidity  Goal: Blood Pressure in Desired Range  Outcome: Met     Problem: Obstructive Sleep Apnea Risk or Actual Comorbidity Management  Goal: Unobstructed Breathing During Sleep  Outcome: Met     Problem: Adjustment to Surgery (Shoulder Arthroplasty)  Goal: Optimal Coping  Outcome: Met     Problem: Bleeding (Shoulder Arthroplasty)  Goal: Absence of Bleeding  Outcome: Met     Problem: Bowel Motility Impaired (Shoulder Arthroplasty)  Goal: Effective Bowel Elimination  Outcome: Met     Problem: Fluid and Electrolyte Imbalance (Shoulder Arthroplasty)  Goal: Fluid and Electrolyte Balance  Outcome: Met     Problem: Functional Ability Impaired (Shoulder Arthroplasty)  Goal: Optimal Functional Ability  Outcome: Met     Problem: Infection (Shoulder Arthroplasty)  Goal: Absence of Infection Signs and Symptoms  Outcome: Met     Problem: Neurovascular Compromise (Shoulder Arthroplasty)  Goal: Intact Neurovascular Status  Outcome: Met     Problem: Ongoing Anesthesia Effects (Shoulder Arthroplasty)  Goal: Anesthesia/Sedation Recovery  Outcome: Met     Problem: Pain (Shoulder Arthroplasty)  Goal: Acceptable Pain Control  Outcome: Met     Problem: Postoperative Nausea and Vomiting (Shoulder  Arthroplasty)  Goal: Nausea and Vomiting Relief  Outcome: Met     Problem: Postoperative Urinary Retention (Shoulder Arthroplasty)  Goal: Effective Urinary Elimination  Outcome: Met     Problem: Behavioral Health Comorbidity  Goal: Maintenance of Behavioral Health Symptom Control  Outcome: Met     Problem: Occupational Therapy  Goal: Occupational Therapy Goal  Description: Pt will perform UB dress c max assist using hemitechniques by d/c. MET  Pt will perform LB dress c max using hemitechniques by d/c. MET  Pt will perform toilet t/f using LRAD c mod assist by d/c. MET  Pt will perform toileting c mod assist by d/c.   Pt will perform Walk in shower t/f c max assist by d/c.   Pt will be Ind c HEP and pxns Compliance by d/c.    Outcome: Met     Problem: Physical Therapy  Goal: Physical Therapy Goal  Description: Ambulate 50 ft. Using LRAD with CGA  5 Stairs: using LRAD with CGA-met  1 Step: using LRAD with CGA  Outcome: Met     Problem: Diabetes Comorbidity  Goal: Blood Glucose Level Within Targeted Range  Outcome: Met

## 2024-04-09 NOTE — PLAN OF CARE
Recvd notification from [facility] -- pt accepted. Transfer today. Call report to [facility] or [facility] [phone].[phone].     k w pt -- verb under & agree.     Handoff communication nurseMalika

## 2024-04-09 NOTE — ASSESSMENT & PLAN NOTE
Patient's FSGs are controlled on current medication regimen.  Last A1c reviewed-   Lab Results   Component Value Date    HGBA1C 5.2 03/12/2024     Most recent fingerstick glucose reviewed-   Recent Labs   Lab 04/08/24 2042 04/09/24  0612 04/09/24  1101   POCTGLUCOSE 122* 116* 110     Current correctional scale  moderate  Maintain anti-hyperglycemic dose as follows-   Antihyperglycemics (From admission, onward)      Start     Stop Route Frequency Ordered    04/09/24 2100  insulin detemir U-100 injection 52 Units         -- SubQ Nightly 04/09/24 1256    04/09/24 1107  insulin aspart U-100 injection 1-10 Units         -- SubQ Before meals & nightly PRN 04/09/24 1107          Hold Oral hypoglycemics while patient is in the hospital.

## 2024-04-09 NOTE — ASSESSMENT & PLAN NOTE
Chronic, controlled. Latest blood pressure and vitals reviewed-     Temp:  [98 °F (36.7 °C)-98.7 °F (37.1 °C)]   Pulse:  [56-69]   Resp:  [18-20]   BP: (145-173)/(70-79)   SpO2:  [93 %-98 %] .   Home meds for hypertension were reviewed and noted below.   Hypertension Medications               isosorbide mononitrate (IMDUR) 60 MG 24 hr tablet Take 60 mg by mouth once daily.    losartan-hydrochlorothiazide 50-12.5 mg (HYZAAR) 50-12.5 mg per tablet Take 1 tablet by mouth once daily.    nitroGLYCERIN (NITROSTAT) 0.4 MG SL tablet Place 0.4 mg under the tongue every 5 (five) minutes as needed for Chest pain.    propranoloL (INDERAL) 10 MG tablet Take 10 mg by mouth 3 (three) times daily.            While in the hospital, will manage blood pressure as follows; Continue home antihypertensive regimen    Will utilize p.r.n. blood pressure medication only if patient's blood pressure greater than 160/100 and she develops symptoms such as worsening chest pain or shortness of breath.

## 2024-04-09 NOTE — NURSING
Nurse Note:   Phone report done with [facility] at [facility] swing bed. Pt escorted to [facility] transport van,  following.  has all of patient personal belongings. Pt stable, no distress. Bath given prior to discharge.     4/9/2024   9:59 AM      Perception of Care - Joint Replacement Population Total Joint Surgery List: SHOULDER    Patient able to verbalize one way to treat/prevent SWELLING at home   [x] Yes   [] No   [] Further Education Provided        Attending Nurse:  Malika

## 2024-04-09 NOTE — SUBJECTIVE & OBJECTIVE
Past Medical History:   Diagnosis Date    Anxiety disorder, unspecified     COPD (chronic obstructive pulmonary disease)     Depression     Diabetes mellitus, type 2     Hypertension     YE (obstructive sleep apnea)     Diagnosed years ago-does not sleep with c-pap       Past Surgical History:   Procedure Laterality Date    CHOLECYSTECTOMY      COLONOSCOPY      CORONARY ARTERY BYPASS GRAFT  2017    HYSTERECTOMY      REVERSE TOTAL SHOULDER ARTHROPLASTY Left 4/3/2024    Procedure: ARTHROPLASTY, SHOULDER, TOTAL, REVERSE;  Surgeon: Watson Hennessy MD;  [facility];  Service: Orthopedics;  Laterality: Left;    ROTATOR CUFF REPAIR Right 03/27/2017       Review of patient's allergies indicates:   Allergen Reactions    Sulfa (sulfonamide antibiotics)        Current Facility-Administered Medications on File Prior to Encounter   Medication    [DISCONTINUED] 0.9%  NaCl infusion    [DISCONTINUED] acetaminophen tablet 500 mg    [DISCONTINUED] albuterol nebulizer solution 0.63 mg    [DISCONTINUED] aluminum-magnesium hydroxide-simethicone 200-200-20 mg/5 mL suspension 30 mL    [DISCONTINUED] aspirin EC tablet 81 mg    [DISCONTINUED] baclofen tablet 10 mg    [DISCONTINUED] buPROPion TBSR 12 hr tablet 150 mg    [DISCONTINUED] cariprazine Cap 1.5 mg    [DISCONTINUED] cefadroxil capsule 500 mg    [DISCONTINUED] dextrose 10% bolus 125 mL 125 mL    [DISCONTINUED] dextrose 10% bolus 250 mL 250 mL    [DISCONTINUED] diazePAM tablet 10 mg    [DISCONTINUED] doxepin capsule 10 mg    [DISCONTINUED] DULoxetine DR capsule 60 mg    [DISCONTINUED] EScitalopram oxalate tablet 20 mg    [DISCONTINUED] fluticasone furoate 100 mcg/actuation inhaler 1 puff    [DISCONTINUED] glucagon (human recombinant) injection 1 mg    [DISCONTINUED] glucose chewable tablet 16 g    [DISCONTINUED] glucose chewable tablet 24 g    [DISCONTINUED] hydroCHLOROthiazide tablet 12.5 mg    [DISCONTINUED] HYDROcodone-acetaminophen 5-325 mg per tablet 1 tablet     [DISCONTINUED] insulin aspart U-100 injection 1-10 Units    [DISCONTINUED] insulin detemir U-100 injection 52 Units    [DISCONTINUED] isosorbide mononitrate 24 hr tablet 60 mg    [DISCONTINUED] lactulose 20 gram/30 mL solution Soln 20 g    [DISCONTINUED] linaCLOtide capsule 145 mcg    [DISCONTINUED] loperamide capsule 2 mg    [DISCONTINUED] losartan tablet 50 mg    [DISCONTINUED] meclizine tablet 25 mg    [DISCONTINUED] melatonin tablet 6 mg    [DISCONTINUED] methocarbamoL tablet 750 mg    [DISCONTINUED] montelukast chewable tablet 10 mg    [DISCONTINUED] ondansetron injection 4 mg    [DISCONTINUED] propranoloL tablet 10 mg    [DISCONTINUED] senna-docusate 8.6-50 mg per tablet 2 tablet    [DISCONTINUED] traMADoL tablet 50 mg    [DISCONTINUED] umeclidinium-vilanteroL 62.5-25 mcg/actuation DsDv 1 puff     Current Outpatient Medications on File Prior to Encounter   Medication Sig    buPROPion (WELLBUTRIN SR) 150 MG TBSR 12 hr tablet Take 150 mg by mouth every morning.    doxepin (SINEQUAN) 10 MG capsule Take 10 mg by mouth every evening.    DULoxetine (CYMBALTA) 60 MG capsule Take 60 mg by mouth nightly. For restless legs    EScitalopram oxalate (LEXAPRO) 20 MG tablet Take 20 mg by mouth once daily.    insulin (BASAGLAR KWIKPEN U-100 INSULIN) glargine 100 units/mL SubQ pen Inject 52 Units into the skin every evening.    isosorbide mononitrate (IMDUR) 60 MG 24 hr tablet Take 60 mg by mouth once daily.    linaCLOtide (LINZESS) 145 mcg Cap capsule Take 145 mcg by mouth before breakfast.    losartan-hydrochlorothiazide 50-12.5 mg (HYZAAR) 50-12.5 mg per tablet Take 1 tablet by mouth once daily.    montelukast (SINGULAIR) 10 mg tablet Take 10 mg by mouth every evening.    propranoloL (INDERAL) 10 MG tablet Take 10 mg by mouth 3 (three) times daily.    VRAYLAR 1.5 mg Cap Take 1.5 mg by mouth once daily.    albuterol (ACCUNEB) 0.63 mg/3 mL Nebu Take 0.63 mg by nebulization every 6 (six) hours as needed. Rescue    BD KATY 2ND  "GEN PEN NEEDLE 32 gauge x " Ndle SMARTSIG:injection 3 Times Daily    cetirizine 10 mg chewable tablet Take 10 mg by mouth once daily.    diclofenac sodium (VOLTAREN) 1 % Gel daily as needed.    fluticasone propionate (FLONASE) 50 mcg/actuation nasal spray 1 spray by Each Nostril route 2 (two) times daily.    fluticasone-umeclidin-vilanter (TRELEGY ELLIPTA) 100-62.5-25 mcg DsDv Inhale 1 puff into the lungs once daily.    meclizine (ANTIVERT) 25 mg tablet Take 25 mg by mouth 3 (three) times daily as needed for Dizziness.    nitroGLYCERIN (NITROSTAT) 0.4 MG SL tablet Place 0.4 mg under the tongue every 5 (five) minutes as needed for Chest pain.    ondansetron (ZOFRAN-ODT) 8 MG TbDL Take 8 mg by mouth 3 (three) times daily as needed.    ONETOUCH ULTRA TEST Strp 3 (three) times daily.    potassium chloride (KLOR-CON) 8 MEQ TbSR Take 8 mEq by mouth once.    [DISCONTINUED] aspirin 81 MG Chew Take 1 tablet (81 mg total) by mouth 2 (two) times a day. Blood clot prevention    [DISCONTINUED] baclofen (LIORESAL) 10 MG tablet Take 10 mg by mouth 3 (three) times daily as needed.    [DISCONTINUED] cefadroxil (DURICEF) 500 MG Cap Take 1 capsule (500 mg total) by mouth every 12 (twelve) hours. for 7 days    [DISCONTINUED] diazePAM (VALIUM) 10 MG Tab Take 10 mg by mouth 2 (two) times daily as needed.    [DISCONTINUED] HYDROcodone-acetaminophen (NORCO) 5-325 mg per tablet Take 1 tablet by mouth every 4 (four) hours as needed for Pain.    [DISCONTINUED] polyethylene glycol (GLYCOLAX) 17 gram PwPk Take 17 g by mouth 2 (two) times daily. Constipation PREVENTION for 14 days     Family History       Problem Relation (Age of Onset)    No Known Problems Mother, Father          Tobacco Use    Smoking status: Former     Current packs/day: 0.00     Types: Cigarettes     Quit date:      Years since quittin.2    Smokeless tobacco: Never   Substance and Sexual Activity    Alcohol use: Never    Drug use: Never    Sexual activity: Not " on file     Review of Systems   Constitutional:  Positive for activity change and fatigue. Negative for appetite change and fever.   Respiratory:  Negative for chest tightness and shortness of breath.    Cardiovascular:  Negative for chest pain and leg swelling.   Gastrointestinal:  Negative for abdominal distention, abdominal pain, constipation, diarrhea, nausea and vomiting.   Musculoskeletal:  Positive for arthralgias and myalgias.   Skin:  Negative for rash and wound.   Neurological:  Positive for dizziness and weakness.     Objective:     Vital Signs (Most Recent):  Temp: 98.6 °F (37 °C) (04/09/24 1107)  Pulse: 69 (04/09/24 1107)  Resp: 20 (04/09/24 1349)  BP: (!) 153/70 (04/09/24 1107)  SpO2: (!) 93 % (04/09/24 1107) Vital Signs (24h Range):  Temp:  [98 °F (36.7 °C)-98.7 °F (37.1 °C)] 98.6 °F (37 °C)  Pulse:  [56-69] 69  Resp:  [18-20] 20  SpO2:  [93 %-98 %] 93 %  BP: (145-173)/(70-79) 153/70     Weight: 93.5 kg (206 lb 2.1 oz)  Body mass index is 34.3 kg/m².     Physical Exam  Vitals and nursing note reviewed.   Constitutional:       General: She is not in acute distress.     Appearance: Normal appearance.   HENT:      Head: Normocephalic and atraumatic.      Nose: Nose normal.   Eyes:      Conjunctiva/sclera: Conjunctivae normal.   Cardiovascular:      Rate and Rhythm: Normal rate and regular rhythm.      Pulses: Normal pulses.      Heart sounds: Normal heart sounds. No murmur heard.     No gallop.   Pulmonary:      Effort: Pulmonary effort is normal.      Breath sounds: Normal breath sounds. No wheezing, rhonchi or rales.   Abdominal:      General: Bowel sounds are normal. There is no distension.      Palpations: Abdomen is soft.      Tenderness: There is no abdominal tenderness. There is no guarding.   Musculoskeletal:         General: No deformity.      Cervical back: Normal range of motion and neck supple.      Right lower leg: No edema.      Left lower leg: No edema.      Comments: Decreased ROM left  shoulder     Skin:     General: Skin is warm and dry.      Findings: No rash.   Neurological:      General: No focal deficit present.      Mental Status: She is alert and oriented to person, place, and time. Mental status is at baseline.      Gait: Gait normal.   Psychiatric:         Mood and Affect: Mood normal.         Thought Content: Thought content normal.         Judgment: Judgment normal.                Significant Labs: All pertinent labs within the past 24 hours have been reviewed.  Recent Lab Results         04/09/24  1101   04/09/24  0612   04/08/24  2042   04/08/24  1600        POC Glucose       132       POCT Glucose 110   116   122                 Significant Imaging: I have reviewed all pertinent imaging results/findings within the past 24 hours.

## 2024-04-09 NOTE — PT/OT/SLP EVAL
"Physical Therapy Swing Bed Evaluation      Patient Name:  Lazara Casillas   MRN:  [id number]    History:     Per MD:  Principal Problem:S/P rotator cuff repair     Chief Complaint:       Chief Complaint   Patient presents with    rehab for left shoulder s/p rotator cuff repair.         HPI: 72 yo CF admitted to swing bed on 4/9/24 for PT/OT s/p elective left reverse total shoulder arthroplasty on 4/4/24 by Dr. Watson Hennessy. PMH includes HTN, DMT2, anxiety, depression, frequent falls, constipation, allergies, COPD. Pt's  is at bedside. Medications reviewed with the pt. She reports that she was falling frequently at home due to weakness in her legs. She also has chronic dizziness/imbalance for which she takes meclezine 1-2 times daily. She last fell about 2 months ago when she injured her left shoulder. She currently denies SOB, CP, abd pain. Endorses b/l leg weakness and discomfort, along with left shoulder pain and decreased ROM.     Past Medical History:   Diagnosis Date    Anxiety disorder, unspecified     COPD (chronic obstructive pulmonary disease)     Depression     Diabetes mellitus, type 2     Hypertension     YE (obstructive sleep apnea)     Diagnosed years ago-does not sleep with c-pap       Past Surgical History:   Procedure Laterality Date    CHOLECYSTECTOMY      COLONOSCOPY      CORONARY ARTERY BYPASS GRAFT  2017    HYSTERECTOMY      REVERSE TOTAL SHOULDER ARTHROPLASTY Left 4/3/2024    Procedure: ARTHROPLASTY, SHOULDER, TOTAL, REVERSE;  Surgeon: Watson Hennessy MD;  Location: [facility];  Service: Orthopedics;  Laterality: Left;    ROTATOR CUFF REPAIR Right 03/27/2017       Recent Surgery: * No surgery found *      Subjective     Chief Complaint: "my legs are weak"  Patient/Family Comments/goals: "to go home"  Pain/Comfort:  Pain Rating 1: 10/10  Location - Side 1: Left  Location 1: shoulder      Living Environment:  Pt currently resides at home with her spouse in a mobile home with 5 steps to enter (R " "railing). Pt reports that she is typically independent with ADLs other than washing her back.  Spouse assists as needed.  Pt does not drive, but her spouse provides transportation.  Pt reports that she was also doing the cooking and the cleaning "until she fell and started having so much trouble with her arm".  Per pt and spouse, pt has experienced at least 4 falls in the last year, at least two of which were major and resulted in injury.  Equipment used at home: none.  DME owned (not currently used): rolling walker and hemiwalker .        Objective:     Patient found  transitioning to the SBA  with CNA  upon PT entry to room.    General Precautions: Standard, fall   Orthopedic Precautions:LUE non weight bearing   Braces: Shoulder abduction brace  Respiratory Status: Room air        Functional Mobility:     Assist Level Assistive Device Comments    Bed Mobility      Sit to stand/Stand to sit Chelsie HW vs SBQC vs no AD Sit to stand/stand to sit performed from BSC, EOB, and WC level. Pt required cueing for positioning and hand placement despite multiple trials performed.  Cues also provided for increased anterior weight shifting.     Transfers Chelsie HW Stand step t/f performed from the EOB to the WC using the HW. Pt demonstrated difficulty maneuvering this device and attempted to abandon it prematurely.  Pt also reached for the chair to sit prior to turning all the way toward the chair.    Gait CGA-Chelsie SBQC vs no AD Pt able to ambulate a total of 150 ft with a step through gait pattern and very slow gait speed. Pt ambulated the first 75 ft with the SBQC and the second 75 ft with no AD.  Pt appeared to have difficulty sequencing a single limb device and actually demonstrated improved balance when the device was taken away.  One LOB noted while ambulating with the cane.  Chelsie provided to correct.  No major LOBs noted when using no AD.   Balance Training      Wheelchair Mobility      Stair Climbing      Car Transfer    " "  Other:             Assessment:     Lazara Casillas is a 73 y.o. female admitted with a medical diagnosis of S/P rotator cuff repair.  She presents with the following impairments/functional limitations:  weakness, impaired endurance, impaired self care skills, impaired functional mobility, gait instability, impaired balance, pain, decreased safety awareness, decreased upper extremity function, impaired cognition.    Rehab Prognosis: Good; patient would benefit from acute skilled PT services to address these deficits and reach maximum level of function.      Additional information: Pt tolerated evaluation fairly well but appears to be most limited by apprehension, fear, and some confusion. Pt required numerous reminders for positioning and hand placement when rising into standing or returning to sitting.  Pt initially c/o no shoulder pain and reported that it was "very tolerable".  Nsg entered the room with medication and pt reported that her pain had increased from an 8 to a 10. Pt demonstrated no pain-associated behaviors throughout session.  Pt was left sitting up in the WC and was encouraged to remain up in the chair as long as possible.  Pt's spouse is able to provide 24 hour care at home.  Will see pt for a few sessions to maximize her safety with basic mobility and she can likely transition into the home environment with  services.  Spouse and pt both aware that due to pt's LUE restrictions, she will require assistance with ADLs upon returning home.  Spouse stated "we just want her legs to get stronger".  When questioned about the "weakness", pt and spouse reported that this has been going on for 6-8 months.  At this time, PT recommends a speech therapy consultation for cognition.    Patient left up in chair with chair alarm on and spouse present.      Plan:     During this hospitalization, patient to be seen 6 x/week to address the identified rehab impairments via gait training, therapeutic activities, " therapeutic exercises and progress toward the following goals:    GOALS:   Multidisciplinary Problems       Physical Therapy Goals          Problem: Physical Therapy    Goal Priority Disciplines Outcome Goal Variances Interventions   Physical Therapy Goal     PT, PT/OT      Description: Patient will increase functional independence with mobility by performin. Supine to sit with Stand-by Assistance  2. Sit to supine with Stand-by Assistance  3. Sit to stand transfer with Supervision  4. Gait  x 250 feet with Supervision using No Assistive Device.   5. Ascend/descend 5 stair with right Handrails Minimal Assistance using No Assistive Device.                          Recommendations:     Discharge Recommendations:  Home with   Discharge Equipment Recommendations: shower chair     Time Tracking:       PT Start Time: 1322     PT Stop Time: 1406  PT Total Time (min): 44 min     Billable Minutes: Evaluation 44      2024

## 2024-04-09 NOTE — PLAN OF CARE
Problem: Adult Inpatient Plan of Care  Goal: Plan of Care Review  Outcome: Ongoing, Progressing  Goal: Patient-Specific Goal (Individualized)  Outcome: Ongoing, Progressing  Goal: Absence of Hospital-Acquired Illness or Injury  Outcome: Ongoing, Progressing  Goal: Optimal Comfort and Wellbeing  Outcome: Ongoing, Progressing  Goal: Readiness for Transition of Care  Outcome: Ongoing, Progressing     Problem: Diabetes Comorbidity  Goal: Blood Glucose Level Within Targeted Range  Outcome: Ongoing, Progressing     Problem: Behavioral Health Comorbidity  Goal: Maintenance of Behavioral Health Symptom Control  Outcome: Ongoing, Progressing     Problem: COPD (Chronic Obstructive Pulmonary Disease) Comorbidity  Goal: Maintenance of COPD Symptom Control  Outcome: Ongoing, Progressing     Problem: Obstructive Sleep Apnea Risk or Actual Comorbidity Management  Goal: Unobstructed Breathing During Sleep  Outcome: Ongoing, Progressing     Problem: Osteoarthritis Comorbidity  Goal: Maintenance of Osteoarthritis Symptom Control  Outcome: Ongoing, Progressing     Problem: Fall Injury Risk  Goal: Absence of Fall and Fall-Related Injury  Outcome: Ongoing, Progressing     Problem: Pain Acute  Goal: Acceptable Pain Control and Functional Ability  Outcome: Ongoing, Progressing     Problem: Gas Exchange Impaired  Goal: Optimal Gas Exchange  Outcome: Ongoing, Progressing     Problem: Adjustment to Surgery (Shoulder Arthroplasty)  Goal: Optimal Coping  Outcome: Ongoing, Progressing     Problem: Functional Ability Impaired (Shoulder Arthroplasty)  Goal: Optimal Functional Ability  Outcome: Ongoing, Progressing     Problem: Neurovascular Compromise (Shoulder Arthroplasty)  Goal: Intact Neurovascular Status  Outcome: Ongoing, Progressing     Problem: Pain (Shoulder Arthroplasty)  Goal: Acceptable Pain Control  Outcome: Ongoing, Progressing

## 2024-04-09 NOTE — PLAN OF CARE
04/09/24 1136   Discharge Assessment   Assessment Type Discharge Planning Assessment   Confirmed/corrected address, phone number and insurance Yes   Confirmed Demographics Correct on Facesheet   Source of Information patient;family   Communicated DOMINGUEZ with patient/caregiver Yes   Reason For Admission Therapy after rotator cuff repair   People in Home spouse   Facility Arrived From: [facility]   Do you expect to return to your current living situation? Yes   Do you have help at home or someone to help you manage your care at home? Yes   Who are your caregiver(s) and their phone number(s)? Jimmy Nash (Significant other) [phone]   Prior to hospitilization cognitive status: Alert/Oriented   Current cognitive status: Alert/Oriented   Walking or Climbing Stairs Difficulty yes   Walking or Climbing Stairs ambulation difficulty, assistance 1 person;stair climbing difficulty, assistance 1 person   Mobility Management Walker   Dressing/Bathing Difficulty yes   Dressing/Bathing bathing difficulty, assistance 1 person;dressing difficulty, assistance 1 person   Home Accessibility stairs to enter home   Number of Stairs, Main Entrance five   Surface of Stairs, Main Entrance concrete   Stair Railings, Main Entrance railings safe and in good condition;railing on left side (ascending)   Home Layout Able to live on 1st floor   Equipment Currently Used at Home none   Readmission within 30 days? No   Do you currently have service(s) that help you manage your care at home? No   Do you take prescription medications? Yes   Do you have prescription coverage? Yes   Coverage [facility]   Do you have any problems affording any of your prescribed medications? No   Is the patient taking medications as prescribed? yes   Who is going to help you get home at discharge? Jimmy Nash (Significant other) [phone]   How do you get to doctors appointments? family or friend will provide   Are you on dialysis? No   Do you take coumadin? No   Discharge  Plan A Home   Discharge Plan B Home Health   DME Needed Upon Discharge  walker, vinod   Discharge Plan discussed with: Spouse/sig other;Patient   Name(s) and Number(s) Jimmy Nash (Significant other) [phone]   Transition of Care Barriers Mobility   Financial Resource Strain   How hard is it for you to pay for the very basics like food, housing, medical care, and heating? Not hard   Housing Stability   In the last 12 months, was there a time when you were not able to pay the mortgage or rent on time? N   In the last 12 months, how many places have you lived? 1   In the last 12 months, was there a time when you did not have a steady place to sleep or slept in a shelter (including now)? N   Transportation Needs   In the past 12 months, has lack of transportation kept you from medical appointments or from getting medications? no   In the past 12 months, has lack of transportation kept you from meetings, work, or from getting things needed for daily living? No   Food Insecurity   Within the past 12 months, you worried that your food would run out before you got the money to buy more. Never true   Within the past 12 months, the food you bought just didn't last and you didn't have money to get more. Never true   Stress   Do you feel stress - tense, restless, nervous, or anxious, or unable to sleep at night because your mind is troubled all the time - these days? To some exte   Social Connections   In a typical week, how many times do you talk on the phone with family, friends, or neighbors? More than 3   How often do you get together with friends or relatives? More than 3   Are you , , , , never , or living with a partner? Living with   Alcohol Use   Q1: How often do you have a drink containing alcohol? Never   Q2: How many drinks containing alcohol do you have on a typical day when you are drinking? None   Q3: How often do you have six or more drinks on one occasion? Never      Patient expressed the need for a rollator, explained she would not be able to use a rollator at this time due to her shoulder sx. She has used a home health agency in [place] in the past and would like to use them again if she needs home health upon discharge. She could not remember the name of agency. Will defer to Dr. Bingham for further discharge plans.

## 2024-04-09 NOTE — HPI
74 yo CF admitted to swing bed on 4/9/24 for PT/OT s/p elective left reverse total shoulder arthroplasty on 4/4/24 by Dr. Watson Hennessy. PMH includes HTN, DMT2, anxiety, depression, frequent falls, constipation, allergies, COPD. Pt's  is at bedside. Medications reviewed with the pt. She reports that she was falling frequently at home due to weakness in her legs. She also has chronic dizziness/imbalance for which she takes meclezine 1-2 times daily. She last fell about 2 months ago when she injured her left shoulder. She currently denies SOB, CP, abd pain. Endorses b/l leg weakness and discomfort, along with left shoulder pain and decreased ROM.

## 2024-04-09 NOTE — DISCHARGE SUMMARY
[facility]  Orthopedic Surgery  Inpatient Discharge Summary:    Patient Name: Lazara Casillas  MRN: [id number]  Admission Date: 4/3/2024  Attending Provider:  No att. providers found  Primary Care Provider: Stacey, Primary Doctor      Discharge Date/Time: 4/9/2024  9:42 AM   Discharge Provider: Shirlene Dos Santos    Diagnoses:   Active Hospital Problems    Diagnosis  POA    *Primary osteoarthritis of left shoulder [M19.012]  Yes    Anxiety disorder, unspecified [F41.9]  Yes    COPD (chronic obstructive pulmonary disease) [J44.9]  Yes    Depression [F32.A]  Yes    Diabetes mellitus, type 2 [E11.9]  Yes    Hypertension [I10]  Yes    YE (obstructive sleep apnea) [G47.33]  Yes     Diagnosed years ago-does not sleep with c-pap      History of coronary artery disease [Z86.79]  Not Applicable    Traumatic incomplete tear of left rotator cuff [S46.012A]  Yes      Resolved Hospital Problems   No resolved problems to display.       Discharge Condition: Stable    Hospital Course: The patient was admitted for elective left reverse total shoulder arthroplasty. Patient tolerated the surgery well with no immediate postoperative complications. She did have some significant issues with physical therapy postoperatively. Patient slowly progressed with physical therapy. Patient's pain was managed appropriately. Patient tolerated a diet. Patient is now stable and ready for discharge. The patient was given thorough discharge instructions and postoperative protocols. Patient will continue previously established pain control and DVT prophylaxis. Patient will follow up as an outpatient with Watson Hennessy MD in 2 weeks.     Final Diagnoses: Same as principal problem.    Disposition:  [facility] bed      Follow Up:  2 weeks postoperatively    Medications:  Reconciled Home Medications:      Medication List        CONTINUE taking these medications      albuterol 0.63 mg/3 mL Nebu  Commonly known as: ACCUNEB  Take 0.63 mg by nebulization every  "6 (six) hours as needed. Rescue     BASAGLAR KWIKPEN U-100 INSULIN glargine 100 units/mL SubQ pen  Generic drug: insulin  Inject 52 Units into the skin every evening.     BD KATY 2ND GEN PEN NEEDLE 32 gauge x 5/32" Ndle  Generic drug: pen needle, diabetic  SMARTSIG:injection 3 Times Daily     buPROPion 150 MG TBSR 12 hr tablet  Commonly known as: WELLBUTRIN SR  Take 150 mg by mouth every morning.     cetirizine 10 mg chewable tablet  Take 10 mg by mouth once daily.     diclofenac sodium 1 % Gel  Commonly known as: VOLTAREN  daily as needed.     doxepin 10 MG capsule  Commonly known as: SINEQUAN  Take 10 mg by mouth every evening.     DULoxetine 60 MG capsule  Commonly known as: CYMBALTA  Take 60 mg by mouth nightly. For restless legs     EScitalopram oxalate 20 MG tablet  Commonly known as: LEXAPRO  Take 20 mg by mouth once daily.     fluticasone propionate 50 mcg/actuation nasal spray  Commonly known as: FLONASE  1 spray by Each Nostril route 2 (two) times daily.     isosorbide mononitrate 60 MG 24 hr tablet  Commonly known as: IMDUR  Take 60 mg by mouth once daily.     LINZESS 145 mcg Cap capsule  Generic drug: linaCLOtide  Take 145 mcg by mouth before breakfast.     losartan-hydrochlorothiazide 50-12.5 mg 50-12.5 mg per tablet  Commonly known as: HYZAAR  Take 1 tablet by mouth once daily.     meclizine 25 mg tablet  Commonly known as: ANTIVERT  Take 25 mg by mouth 3 (three) times daily as needed for Dizziness.     montelukast 10 mg tablet  Commonly known as: SINGULAIR  Take 10 mg by mouth every evening.     nitroGLYCERIN 0.4 MG SL tablet  Commonly known as: NITROSTAT  Place 0.4 mg under the tongue every 5 (five) minutes as needed for Chest pain.     ondansetron 8 MG Tbdl  Commonly known as: ZOFRAN-ODT  Take 8 mg by mouth 3 (three) times daily as needed.     ONETOUCH ULTRA TEST Strp  Generic drug: blood sugar diagnostic  3 (three) times daily.     potassium chloride 8 MEQ Tbsr  Commonly known as: KLOR-CON  Take 8 " mEq by mouth once.     propranoloL 10 MG tablet  Commonly known as: INDERAL  Take 10 mg by mouth 3 (three) times daily.     TRELEGY ELLIPTA 100-62.5-25 mcg Dsdv  Generic drug: fluticasone-umeclidin-vilanter  Inhale 1 puff into the lungs once daily.     VRAYLAR 1.5 mg Cap  Generic drug: cariprazine  Take 1.5 mg by mouth once daily.            STOP taking these medications      baclofen 10 MG tablet  Commonly known as: LIORESAL     diazePAM 10 MG Tab  Commonly known as: VALIUM            Discharge Procedure Orders   Ambulatory referral/consult to Home Health   Standing Status: Future   Referral Priority: Routine Referral Type: Home Health   Referral Reason: Specialty Services Required   Requested Specialty: Home Health Services   Number of Visits Requested: 1      Follow-up Information       Watson Hennessy MD Follow up on 4/18/2024.    Specialty: Orthopedic Surgery  Why: Follow up appoinment on Thursday 4/18/2024 at 10:15am with Dr. Hennessy  Contact information:  [address]  [address]  [facility] [id number] [phone]               [facility] Follow up.    Specialties: Home Health Services, Home Therapy Services, Home Living Aide Services  Why: This is home health agency. Home health will provide therapy & dressing changes for 2 weeks. Call if you have questions or concerns.  Contact information:  [address] [facility] [address] [facility], [address]  [facility] [id number] [phone]             [facility] Follow up.    Why: This is the outpatient therapy facility--to start after home health therapy. They will contact pt with appt date & time. Call if you have questions or concerns.  Contact information:  [address] [place] [address]  [phone]               [facility] Follow up.    Why: This is the equipment company. Call if you have questions or concerns.  Contact information:  [address]  [place] [id number] [phone]                              Discharge Instructions:  There are no outpatient Patient Instructions on file for this admission.

## 2024-04-10 LAB
POCT GLUCOSE: 101 MG/DL (ref 70–110)
POCT GLUCOSE: 101 MG/DL (ref 70–110)
POCT GLUCOSE: 120 MG/DL (ref 70–110)
POCT GLUCOSE: 73 MG/DL (ref 70–110)

## 2024-04-10 PROCEDURE — 11000004 HC SNF PRIVATE

## 2024-04-10 PROCEDURE — 94761 N-INVAS EAR/PLS OXIMETRY MLT: CPT

## 2024-04-10 PROCEDURE — 94640 AIRWAY INHALATION TREATMENT: CPT

## 2024-04-10 PROCEDURE — 25000003 PHARM REV CODE 250: Performed by: INTERNAL MEDICINE

## 2024-04-10 PROCEDURE — 99900035 HC TECH TIME PER 15 MIN (STAT)

## 2024-04-10 PROCEDURE — 25000003 PHARM REV CODE 250: Performed by: NURSE PRACTITIONER

## 2024-04-10 PROCEDURE — 97116 GAIT TRAINING THERAPY: CPT

## 2024-04-10 PROCEDURE — 25000242 PHARM REV CODE 250 ALT 637 W/ HCPCS: Performed by: INTERNAL MEDICINE

## 2024-04-10 PROCEDURE — 97530 THERAPEUTIC ACTIVITIES: CPT

## 2024-04-10 PROCEDURE — 97110 THERAPEUTIC EXERCISES: CPT

## 2024-04-10 PROCEDURE — 63600175 PHARM REV CODE 636 W HCPCS: Performed by: INTERNAL MEDICINE

## 2024-04-10 PROCEDURE — 92523 SPEECH SOUND LANG COMPREHEN: CPT

## 2024-04-10 PROCEDURE — 97166 OT EVAL MOD COMPLEX 45 MIN: CPT

## 2024-04-10 RX ADMIN — ASPIRIN 81 MG: 81 TABLET, COATED ORAL at 08:04

## 2024-04-10 RX ADMIN — MONTELUKAST 10 MG: 10 TABLET, FILM COATED ORAL at 08:04

## 2024-04-10 RX ADMIN — DULOXETINE HYDROCHLORIDE 60 MG: 30 CAPSULE, DELAYED RELEASE ORAL at 08:04

## 2024-04-10 RX ADMIN — DIAZEPAM 10 MG: 10 TABLET ORAL at 08:04

## 2024-04-10 RX ADMIN — PROPRANOLOL HYDROCHLORIDE 10 MG: 10 TABLET ORAL at 03:04

## 2024-04-10 RX ADMIN — PROPRANOLOL HYDROCHLORIDE 10 MG: 10 TABLET ORAL at 08:04

## 2024-04-10 RX ADMIN — SENNOSIDES AND DOCUSATE SODIUM 1 TABLET: 50; 8.6 TABLET ORAL at 08:04

## 2024-04-10 RX ADMIN — MUPIROCIN 1 G: 20 OINTMENT TOPICAL at 08:04

## 2024-04-10 RX ADMIN — ISOSORBIDE MONONITRATE 60 MG: 30 TABLET, EXTENDED RELEASE ORAL at 08:04

## 2024-04-10 RX ADMIN — BUPROPION HYDROCHLORIDE 150 MG: 150 TABLET, EXTENDED RELEASE ORAL at 06:04

## 2024-04-10 RX ADMIN — CEFADROXIL 500 MG: 500 CAPSULE ORAL at 08:04

## 2024-04-10 RX ADMIN — CARIPRAZINE 1.5 MG: 1.5 CAPSULE, GELATIN COATED ORAL at 08:04

## 2024-04-10 RX ADMIN — ACETAMINOPHEN 500 MG: 500 TABLET ORAL at 06:04

## 2024-04-10 RX ADMIN — ESCITALOPRAM OXALATE 20 MG: 10 TABLET ORAL at 08:04

## 2024-04-10 RX ADMIN — HYDROCHLOROTHIAZIDE 12.5 MG: 12.5 TABLET ORAL at 08:04

## 2024-04-10 RX ADMIN — INSULIN DETEMIR 52 UNITS: 100 INJECTION, SOLUTION SUBCUTANEOUS at 08:04

## 2024-04-10 RX ADMIN — LOSARTAN POTASSIUM 50 MG: 25 TABLET, FILM COATED ORAL at 08:04

## 2024-04-10 RX ADMIN — UMECLIDINIUM 62.5 MCG: 62.5 AEROSOL, POWDER ORAL at 01:04

## 2024-04-10 RX ADMIN — METHOCARBAMOL TABLETS 500 MG: 500 TABLET, COATED ORAL at 08:04

## 2024-04-10 RX ADMIN — FLUTICASONE FUROATE AND VILANTEROL TRIFENATATE 1 PUFF: 100; 25 POWDER RESPIRATORY (INHALATION) at 06:04

## 2024-04-10 RX ADMIN — LINACLOTIDE 145 MCG: 145 CAPSULE, GELATIN COATED ORAL at 06:04

## 2024-04-10 NOTE — PROGRESS NOTES
[facility] - Medical Surgical Unit  [facility] Medicine  Progress Note    Patient Name: Lazara Casillas  MRN: [id number]  Patient Class: IP- Swing   Admission Date: 4/9/2024  Length of Stay: 1 days  Attending Physician: Gris Bingham DO  Primary Care Provider: Stacey, Primary Doctor        Subjective:     Principal Problem:S/P rotator cuff repair        HPI:  74 yo CF admitted to swing bed on 4/9/24 for PT/OT s/p elective left reverse total shoulder arthroplasty on 4/4/24 by Dr. Watson Hennessy. PMH includes HTN, DMT2, anxiety, depression, frequent falls, constipation, allergies, COPD. Pt's  is at bedside. Medications reviewed with the pt. She reports that she was falling frequently at home due to weakness in her legs. She also has chronic dizziness/imbalance for which she takes meclezine 1-2 times daily. She last fell about 2 months ago when she injured her left shoulder. She currently denies SOB, CP, abd pain. Endorses b/l leg weakness and discomfort, along with left shoulder pain and decreased ROM.     Overview/Hospital Course:  4/10/24: Pt sitting up in chair at bedside. She states she is feeling good this morning. She denies chest discomfort or SOB. She worked with PT/OT this morning.    Past Medical History:   Diagnosis Date    Anxiety disorder, unspecified     COPD (chronic obstructive pulmonary disease)     Depression     Diabetes mellitus, type 2     Hypertension     YE (obstructive sleep apnea)     Diagnosed years ago-does not sleep with c-pap       Past Surgical History:   Procedure Laterality Date    CHOLECYSTECTOMY      COLONOSCOPY      CORONARY ARTERY BYPASS GRAFT  2017    HYSTERECTOMY      REVERSE TOTAL SHOULDER ARTHROPLASTY Left 4/3/2024    Procedure: ARTHROPLASTY, SHOULDER, TOTAL, REVERSE;  Surgeon: Watson Hennessy MD;  Location: [facility];  Service: Orthopedics;  Laterality: Left;    ROTATOR CUFF REPAIR Right 03/27/2017       Review of patient's allergies indicates:   Allergen Reactions    Sulfa  "(sulfonamide antibiotics)        No current facility-administered medications on file prior to encounter.     Current Outpatient Medications on File Prior to Encounter   Medication Sig    buPROPion (WELLBUTRIN SR) 150 MG TBSR 12 hr tablet Take 150 mg by mouth every morning.    doxepin (SINEQUAN) 10 MG capsule Take 10 mg by mouth every evening.    DULoxetine (CYMBALTA) 60 MG capsule Take 60 mg by mouth nightly. For restless legs    EScitalopram oxalate (LEXAPRO) 20 MG tablet Take 20 mg by mouth once daily.    insulin (BASAGLAR KWIKPEN U-100 INSULIN) glargine 100 units/mL SubQ pen Inject 52 Units into the skin every evening.    isosorbide mononitrate (IMDUR) 60 MG 24 hr tablet Take 60 mg by mouth once daily.    linaCLOtide (LINZESS) 145 mcg Cap capsule Take 145 mcg by mouth before breakfast.    losartan-hydrochlorothiazide 50-12.5 mg (HYZAAR) 50-12.5 mg per tablet Take 1 tablet by mouth once daily.    montelukast (SINGULAIR) 10 mg tablet Take 10 mg by mouth every evening.    propranoloL (INDERAL) 10 MG tablet Take 10 mg by mouth 3 (three) times daily.    VRAYLAR 1.5 mg Cap Take 1.5 mg by mouth once daily.    albuterol sulfate (PROAIR RESPICLICK) 90 mcg/actuation inhaler Inhale 2 puffs into the lungs every 6 (six) hours as needed for Wheezing. Rescue    albuterol-ipratropium (DUO-NEB) 2.5 mg-0.5 mg/3 mL nebulizer solution Take 3 mLs by nebulization every 6 (six) hours as needed for Wheezing. Rescue    BD KATY 2ND GEN PEN NEEDLE 32 gauge x 5/32" Ndle SMARTSIG:injection 3 Times Daily    cetirizine 10 mg chewable tablet Take 10 mg by mouth once daily.    diclofenac sodium (VOLTAREN) 1 % Gel daily as needed.    fluticasone propionate (FLONASE) 50 mcg/actuation nasal spray 1 spray by Each Nostril route 2 (two) times daily.    fluticasone-umeclidin-vilanter (TRELEGY ELLIPTA) 100-62.5-25 mcg DsDv Inhale 1 puff into the lungs once daily.    meclizine (ANTIVERT) 25 mg tablet Take 25 mg by mouth 3 (three) times daily as needed " for Dizziness.    nitroGLYCERIN (NITROSTAT) 0.4 MG SL tablet Place 0.4 mg under the tongue every 5 (five) minutes as needed for Chest pain.    ondansetron (ZOFRAN-ODT) 8 MG TbDL Take 8 mg by mouth 3 (three) times daily as needed.    ONETOUCH ULTRA TEST Strp 3 (three) times daily.    potassium chloride (KLOR-CON) 8 MEQ TbSR Take 8 mEq by mouth once.     Family History       Problem Relation (Age of Onset)    No Known Problems Mother, Father          Tobacco Use    Smoking status: Former     Current packs/day: 0.00     Types: Cigarettes     Quit date:      Years since quittin.2    Smokeless tobacco: Never   Substance and Sexual Activity    Alcohol use: Never    Drug use: Never    Sexual activity: Not on file     Review of Systems   Constitutional:  Positive for activity change and fatigue. Negative for appetite change and fever.   Respiratory:  Negative for chest tightness and shortness of breath.    Cardiovascular:  Negative for chest pain and leg swelling.   Gastrointestinal:  Negative for abdominal distention, abdominal pain, constipation, diarrhea, nausea and vomiting.   Musculoskeletal:  Positive for arthralgias and myalgias.   Skin:  Negative for rash and wound.   Neurological:  Positive for dizziness and weakness.     Objective:     Vital Signs (Most Recent):  Temp: 98.2 °F (36.8 °C) (04/10/24 075)  Pulse: 66 (04/10/24 08)  Resp: 18 (04/10/24 075)  BP: (!) 143/78 (04/10/24 0827)  SpO2: 97 % (04/10/24 0756) Vital Signs (24h Range):  Temp:  [97.6 °F (36.4 °C)-98.2 °F (36.8 °C)] 98.2 °F (36.8 °C)  Pulse:  [66-77] 66  Resp:  [18-20] 18  SpO2:  [95 %-98 %] 97 %  BP: (143-197)/(78-85) 143/78     Weight: 93.5 kg (206 lb 2.1 oz)  Body mass index is 34.3 kg/m².     Physical Exam  Vitals and nursing note reviewed.   Constitutional:       General: She is not in acute distress.     Appearance: Normal appearance.   HENT:      Head: Normocephalic and atraumatic.      Nose: Nose normal.   Eyes:       Conjunctiva/sclera: Conjunctivae normal.   Cardiovascular:      Rate and Rhythm: Normal rate and regular rhythm.      Pulses: Normal pulses.      Heart sounds: Normal heart sounds. No murmur heard.     No gallop.   Pulmonary:      Effort: Pulmonary effort is normal.      Breath sounds: Normal breath sounds. No wheezing, rhonchi or rales.   Abdominal:      General: Bowel sounds are normal. There is no distension.      Palpations: Abdomen is soft.      Tenderness: There is no abdominal tenderness. There is no guarding.   Musculoskeletal:         General: No deformity.      Cervical back: Normal range of motion and neck supple.      Right lower leg: No edema.      Left lower leg: No edema.      Comments: Decreased ROM left shoulder     Skin:     General: Skin is warm and dry.      Findings: No rash.   Neurological:      General: No focal deficit present.      Mental Status: She is alert and oriented to person, place, and time. Mental status is at baseline.      Gait: Gait normal.   Psychiatric:         Mood and Affect: Mood normal.         Thought Content: Thought content normal.         Judgment: Judgment normal.                Significant Labs: All pertinent labs within the past 24 hours have been reviewed.  Recent Lab Results         04/10/24  1044   04/10/24  0625   04/09/24  2106   04/09/24  1704        POCT Glucose 101   73   101   104               Significant Imaging: I have reviewed all pertinent imaging results/findings within the past 24 hours.    Assessment/Plan:      * S/P rotator cuff repair  Admit to swing bed.  PT/OT  Pain control.  ISS  Home/transfer medications reconciled.        Falls frequently  PT/OT      Hypertension  Chronic, controlled. Latest blood pressure and vitals reviewed-     Temp:  [97.6 °F (36.4 °C)-98.2 °F (36.8 °C)]   Pulse:  [66-77]   Resp:  [18-20]   BP: (143-197)/(78-85)   SpO2:  [95 %-98 %] .   Home meds for hypertension were reviewed and noted below.   Hypertension Medications                isosorbide mononitrate (IMDUR) 60 MG 24 hr tablet Take 60 mg by mouth once daily.    losartan-hydrochlorothiazide 50-12.5 mg (HYZAAR) 50-12.5 mg per tablet Take 1 tablet by mouth once daily.    nitroGLYCERIN (NITROSTAT) 0.4 MG SL tablet Place 0.4 mg under the tongue every 5 (five) minutes as needed for Chest pain.    propranoloL (INDERAL) 10 MG tablet Take 10 mg by mouth 3 (three) times daily.            While in the hospital, will manage blood pressure as follows; Continue home antihypertensive regimen    Will utilize p.r.n. blood pressure medication only if patient's blood pressure greater than 160/100 and she develops symptoms such as worsening chest pain or shortness of breath.    Diabetes mellitus, type 2  Patient's FSGs are controlled on current medication regimen.  Last A1c reviewed-   Lab Results   Component Value Date    HGBA1C 5.2 03/12/2024     Most recent fingerstick glucose reviewed-   Recent Labs   Lab 04/09/24  1704 04/09/24  2106 04/10/24  0625 04/10/24  1044   POCTGLUCOSE 104 101 73 101       Current correctional scale  moderate  Maintain anti-hyperglycemic dose as follows-   Antihyperglycemics (From admission, onward)      Start     Stop Route Frequency Ordered    04/09/24 2100  insulin detemir U-100 injection 52 Units         -- SubQ Nightly 04/09/24 1256    04/09/24 1107  insulin aspart U-100 injection 1-10 Units         -- SubQ Before meals & nightly PRN 04/09/24 1107          Hold Oral hypoglycemics while patient is in the hospital.    Depression  Home meds reconciled.        COPD (chronic obstructive pulmonary disease)  Home meds reconciled.    Anxiety disorder, unspecified  Home meds reconciled.      Traumatic incomplete tear of left rotator cuff        History of coronary artery disease        Primary osteoarthritis of left shoulder          VTE Risk Mitigation (From admission, onward)           Ordered     Place JOE hose  Until discontinued        Comments: Remove at least  daily for skin assessment    04/09/24 1107                    Discharge Planning   DOMINGUEZ:      Code Status: Not on file   Is the patient medically ready for discharge?:     Reason for patient still in hospital (select all that apply): Patient trending condition and PT / OT recommendations  Discharge Plan A: Home   Discharge Delays: None known at this time              SKYE COLEMAN DO  Department of Hospital Medicine   [facility] - Medical Surgical Unit

## 2024-04-10 NOTE — PLAN OF CARE
ST POC initiated.     Problem: SLP  Goal: SLP Goal  Description: LT. Pt will use cognitive strategies for improved safety and communication.    STGs:  1. Pt will participate in further cognitive assessment and functional assessment with PT.  Outcome: Ongoing, Progressing

## 2024-04-10 NOTE — PT/OT/SLP EVAL
[facility]  Speech Language Pathology   Cognitive Communication Evaluation    Patient Name:  Lazara Casillas   MRN:  [id number]  Admitting Diagnosis: S/P rotator cuff repair    Past Medical History:   Diagnosis Date    Anxiety disorder, unspecified     COPD (chronic obstructive pulmonary disease)     Depression     Diabetes mellitus, type 2     Hypertension     YE (obstructive sleep apnea)     Diagnosed years ago-does not sleep with c-pap       Past Surgical History:   Procedure Laterality Date    CHOLECYSTECTOMY      COLONOSCOPY      CORONARY ARTERY BYPASS GRAFT  2017    HYSTERECTOMY      REVERSE TOTAL SHOULDER ARTHROPLASTY Left 4/3/2024    Procedure: ARTHROPLASTY, SHOULDER, TOTAL, REVERSE;  Surgeon: Watson Hennessy MD;  Location: [facility];  Service: Orthopedics;  Laterality: Left;    ROTATOR CUFF REPAIR Right 03/27/2017     HPI per MD:  74 yo CF admitted to swing bed on 4/9/24 for PT/OT s/p elective left reverse total shoulder arthroplasty on 4/4/24 by Dr. Watson Hennessy. PMH includes HTN, DMT2, anxiety, depression, frequent falls, constipation, allergies, COPD. Pt's  is at bedside. Medications reviewed with the pt. She reports that she was falling frequently at home due to weakness in her legs. She also has chronic dizziness/imbalance for which she takes meclezine 1-2 times daily. She last fell about 2 months ago when she injured her left shoulder. She currently denies SOB, CP, abd pain. Endorses b/l leg weakness and discomfort, along with left shoulder pain and decreased ROM.     Social History: Patient lives with her     Occupation/hobbies/homemaking: does not drive, pays bills, handles appointments, cooked prior to injuring shoulder    Respiratory Status: room air    Recommendations:                 General Recommendations:  Cognitive-linguistic therapy  Diet recommendations:  Regular Diet - IDDSI Level 7, Thin liquids - IDDSI Level 0   Aspiration Precautions:  none    General Precautions:  "Standard, fall  Communication strategies:   repeat as needed    Subjective     Patient reported: Pt reported confusion before and since surgery to PT. Pt reported minimal increase in confusion in general. Pt's  reported confusion "sometimes". Wgen reviewing specific situations, Pt and family more identified confusion.  Patient goals: "home"  Behavioral observations: Pt confused in conversation at times.     Pain/Comfort:   none    Objective:     Orientation: Ox4   General Attention: repetition required of stim; Pt noted to repeat self     LANGUAGE:   Receptive Language:  Simple y/n Questions: 100%  Complex y/n Questions: 100%  Paragraph Comprehension: 67%    Expressive Language:   Automatic Speech: 100%  Repetition: 100%  Phrase completion: 100%  Naming items: 100%  Item Function: 100%  WH questions: 100%  Divergent Namin/10 - reduced awareness of repetition of self noted.       COGNITION:  Long Term Memory: 100%  Memory Impairment Screen (MIS):  (<=4: possible cognitive deficit likely)  Recall/ Repetition: 66% acc (reduced at complex sentence and 4 number repeat levels)  Working Memory/backwards span: 75% reduced at 4 unit level  Convergent namin%  Sequencing by attribute: 33%  Verbal Sequencin% cues required  Verbal Problem Solvin% cues required  Insight and Awareness: good insight, reduced awareness at times  Pragmatics: WNL  Simple Money/Time Management: 35% acc      Assessment:   Lazraa Casillas is a 73 y.o. female with an SLP diagnosis of Cognitive-Linguistic Impairment.  She presents with reduced working memory, sequencing, and attention.    MAMADOU NOMS (Functional Communication Measures): to be completed with further testing.  MAMADOU NOMS: EVAL (... )   Attention:    Memory:    Motor Speech:    Reading:    Spoken Language Comprehension:    Spoken Language Expression:    Swallowing:      Goals:   Multidisciplinary Problems       SLP Goals          Problem: SLP    Goal Priority " Disciplines Outcome   SLP Goal     SLP Ongoing, Progressing   Description: LT. Pt will use cognitive strategies for improved safety and communication.    STGs:  1. Pt will participate in further cognitive assessment and functional assessment with PT.                       Plan:   Further cognitive assessment is warranted.  Patient to be seen:   (3-5x/week)   Plan of Care expires:  24  Plan of Care reviewed with:  patient, spouse   SLP Follow-Up:  Yes       Discharge recommendations:      Barriers to Discharge:  Level of Skilled Assistance Needed   and Safety Awareness      Time Tracking:   SLP Treatment Date:   04/10/24  Speech Start Time:  1420  Speech Stop Time:  1450     Speech Total Time (min):  30 min    Billable Minutes: Eval 30min / 1 unit     Sonya Olsen MA, CCC-SLP  04/10/2024

## 2024-04-10 NOTE — PT/OT/SLP PROGRESS
Physical Therapy Treatment Note           Name: Lazara Casillas    : 1950 (73 y.o.)  MRN: [id number]             Subjective Assessment:     No complaints  Lethargic   X Awake, alert, cooperative  Uncooperative    Agitated  c/o pain    Appropriate  c/o fatigue   X Confused  Treated at bedside     Emotionally labile  Treated in gym/dept.    Impulsive  Other:    Flat affect       Pain/Comfort:    Pain Rating 1: 0/10    Therapy Precautions:     X Cognitive deficits  Spinal precautions    Collar - hard  Sternal precautions    Collar - soft   TLSO   X Fall risk  LSO    Hip precautions - posterior  Knee immobilizer    Hip precautions - anterior  WBAT    Impaired communication  Partial weightbearing    Oxygen  TTWB    PEG tube X NWB LUE in sling    Visual deficits  Other:    Hearing deficits          Mobility Training:     Assist Level Assistive Device Comments    Bed Mobility Chelsie  Semi-supine to sitting at EOB.  Pt able to pull up with her RUE, but demonstrated difficulty with anterior weight shifting toward the EOB.    Sit to stand/Stand to sit SBA-CGA  Sit to stand/stand to sit.  Pt stood from EOB and from WC level.     Transfers SBA-Chelsie  Stand step t/f performed from the EOB to the WC x 3 consecutive trials. Pt demonstrates increased difficulty with balance and coordination when mobilizing within confined spaces.  Pt appears to anticipate her destination prematurely and attempts to sit prior to reaching the chair/bed.  Cues provided for safety and reaching prior to sitting.  Posterior LOB noted upon each transition.    Gait CGA  Pt able to ambulate 165 ft with a step through gait pattern and slow gait speed.  Limited foot clearance and step length noted, but improved since this AM's session.  No overt LOB noted, even with a directional change at the end of the hallway.  Improved balance noted without use of an AD.   Balance Training      Wheelchair Mobility      Stair Climbing      Car Transfer     "  Other:             Assessment:     Patient tolerated session fairly but struggles to maintain her balance when mobilizing within confined spaces.  In the open hallway, pt is able to ambulate without LOB. When transitioning from bed to chair, pt requires Chelsie and experiences a posterior lean resulting in a LOB.  Upon speaking to pt's spouse about these concerns, it was eventually revealed that pt's PCP had recommended a neurology consult just prior to pt going in for her shoulder surgery.  Spouse stated "we told him we needed to worry about the shoulder first".        GOALS:   Multidisciplinary Problems       Physical Therapy Goals          Problem: Physical Therapy    Goal Priority Disciplines Outcome Goal Variances Interventions   Physical Therapy Goal     PT, PT/OT Ongoing, Progressing     Description: Patient will increase functional independence with mobility by performin. Supine to sit with Stand-by Assistance  2. Sit to supine with Stand-by Assistance  3. Sit to stand transfer with Supervision  4. Gait  x 250 feet with Supervision using No Assistive Device.   5. Ascend/descend 5 stair with right Handrails Minimal Assistance using No Assistive Device.                            Discharge Recommendations:      Home with 24 hour care and     Discharge Equipment Recommendations:     shower chair     At end of treatment, patient remained:    X  Up in chair     In bed   X With alarm activated    Bed rails up   X Call bell in reach    X Family/friends present     Restraints secured properly     In bathroom with CNA/RN notified     In gym with PT/PTA/tech     Nurse aware     Other:           PT Start Time: 1330     PT Stop Time: 1359  PT Total Time (min): 29 min     Billable Minutes: Gait Training 15 and Therapeutic Activity 14      04/10/2024    "

## 2024-04-10 NOTE — HOSPITAL COURSE
4/10/24: Pt sitting up in chair at bedside. She states she is feeling good this morning. She denies chest discomfort or SOB. She worked with PT/OT this morning.    4/11/24: Pt sitting up in the chair at bedside. She has worked with PT already this AM. She states she is feeling fine. Denies SOB or CP. Does endorse pain in her left shoulder.       4/12/24:Pt sitting up in chair at bedside. She has no complaints this morning. Plan is for DC Wednesday of next week, home with hh.    4/13/24: Pt resting comfortably in bed. She denies any cp, sob, abd pain. States she is having too many bms, so she declines linzess.     4/14/24: Pt on the commode during first attempt to examine. Revisited. Lying in bed comfortably. No new complaints this AM.     4/15/24: Pt sitting up in chair at bedside. She denies any CP, sob, abd pain.     4/16/24-Patient is sitting up in chair.  Voices no complaints at this time.  Planned for discharge home tomorrow.    4/17/24-No new issues today.  Patient is stable for discharge home today.  Exam(A&O, NAD, RRR, CTA, BS +, ROM I except for left shoulder)

## 2024-04-10 NOTE — ASSESSMENT & PLAN NOTE
Patient's FSGs are controlled on current medication regimen.  Last A1c reviewed-   Lab Results   Component Value Date    HGBA1C 5.2 03/12/2024     Most recent fingerstick glucose reviewed-   Recent Labs   Lab 04/09/24  1704 04/09/24  2106 04/10/24  0625 04/10/24  1044   POCTGLUCOSE 104 101 73 101       Current correctional scale  moderate  Maintain anti-hyperglycemic dose as follows-   Antihyperglycemics (From admission, onward)    Start     Stop Route Frequency Ordered    04/09/24 2100  insulin detemir U-100 injection 52 Units         -- SubQ Nightly 04/09/24 1256    04/09/24 1107  insulin aspart U-100 injection 1-10 Units         -- SubQ Before meals & nightly PRN 04/09/24 1107        Hold Oral hypoglycemics while patient is in the hospital.

## 2024-04-10 NOTE — PT/OT/SLP EVAL
"Occupational Therapy   Initial Evaluation    Name: Lazara Casillas  MRN: [id number]  Admitting Diagnosis:  S/P rotator cuff repair      History:     Principal Problem:S/P rotator cuff repair           HPI:  74 yo CF admitted to swing bed on 4/9/24 for PT/OT s/p elective left reverse total shoulder arthroplasty on 4/4/24 by Dr. Watson Hennessy. PMH includes HTN, DMT2, anxiety, depression, frequent falls, constipation, allergies, COPD. Pt's  is at bedside. Medications reviewed with the pt. She reports that she was falling frequently at home due to weakness in her legs. She also has chronic dizziness/imbalance for which she takes meclezine 1-2 times daily. She last fell about 2 months ago when she injured her left shoulder. She currently denies SOB, CP, abd pain. Endorses b/l leg weakness and discomfort, along with left shoulder pain and decreased ROM.     Past Medical History:   Diagnosis Date    Anxiety disorder, unspecified     COPD (chronic obstructive pulmonary disease)     Depression     Diabetes mellitus, type 2     Hypertension     YE (obstructive sleep apnea)     Diagnosed years ago-does not sleep with c-pap         Past Surgical History:   Procedure Laterality Date    CHOLECYSTECTOMY      COLONOSCOPY      CORONARY ARTERY BYPASS GRAFT  2017    HYSTERECTOMY      REVERSE TOTAL SHOULDER ARTHROPLASTY Left 4/3/2024    Procedure: ARTHROPLASTY, SHOULDER, TOTAL, REVERSE;  Surgeon: Watson Hennessy MD;  Location: [facility];  Service: Orthopedics;  Laterality: Left;    ROTATOR CUFF REPAIR Right 03/27/2017         Subjective     Chief Complaint: Left shoulder soreness  Patient/Family Comments/goals: "To go home."    Occupational Profile:  Pt currently resides at home with her spouse in a mobile home with 5 steps to enter (R railing). Pt reports that she is typically independent with ADLs other than washing her back.  Spouse assists as needed.  Pt does not drive, but her spouse provides transportation.  Pt reports that she was " "also doing the cooking and the cleaning "until she fell and started having so much trouble with her arm".  Per pt and spouse, pt has experienced at least 4 falls in the last year, at least two of which were major and resulted in injury.  Equipment used at home: none.  DME owned (not currently used): rolling walker and hemiwalker .      Objective:     Communicated with: PT prior to session.  Patient found  in middle of transfer from [facility] to  with RN upon OT entry to room.    General Precautions: Standard, fall   Orthopedic Precautions:LUE non weight bearing   Braces: Shoulder abduction brace  Respiratory Status: Room air    Occupational Performance:    Mobility  Assist level Comments    Bed mobility     Balance training     Transfer CGA    Functional mobility     Sit to stand transitions Min    Other:         Activities of Daily Living Assist level Comments    Feeding     Grooming/hygiene Setup    Bathing Max    Upper body dressing Max    Lower body dressing Max    Toileting Max    Toilet transfer Min    Adaptive equipment training     Other:       Upper Extremity Strength:  Right Upper Extremity: WFL except proximal strength 4+/5 MMT  Left Upper Extremity:  Shoulder not assessed secondary to recent surgery, Elbow AAROM flexion -, forearm/wrist/and hand AROM WFL.    Cognitive/Visual Perceptual:  Cognitive/Psychosocial Skills:     -       Oriented to: Person, Place, Time, and Situation   -       Follows Commands/attention:Follows one-step commands  -       Communication: clear/fluent  -       Memory: Impaired STM  -       Safety awareness/insight to disability: impaired   -       Mood/Affect/Coping skills/emotional control: Appropriate to situation and Pleasant      Treatment & Education:  While seated in  unhooked shoulder strap from Abduction Sling, performed left elbow AAROM followed by AROM wrist and hand ROM ex's perform 20 reps ea. Pt educated on hand pumps to be performed every hour.  Sling reapplied.  " Discussed scope and goals of OT and POC.  Educated pt call bell function and call before you fall.      Assessment:     Lazara Casillas is a 73 y.o. female with a medical diagnosis of S/P rotator cuff repair.  She presents with decreased functional transfers and ADLs secondary to recent left reverse total shoulder surgery. Performance deficits affecting function: weakness, impaired functional mobility, impaired cognition, decreased safety awareness, gait instability, pain, impaired balance, impaired self care skills, decreased ROM.      Rehab Prognosis: Fair; patient would benefit from acute skilled OT services to address these deficits and reach maximum level of function.     Pain/Comfort:  Pain Rating 1: 4/10  Location - Side 1: Left  Location 1: shoulder  Pain Addressed 1: Pre-medicate for activity, Reposition, Distraction    Blood Pressure:        Plan:     Patient to be seen 5 x/week to address the above listed problems via self-care/home management, therapeutic activities, therapeutic exercises  Plan of Care Reviewed with: patient      GOALS:   Multidisciplinary Problems       Occupational Therapy Goals          Problem: Occupational Therapy    Goal Priority Disciplines Outcome Interventions   Occupational Therapy Goal     OT, PT/OT     Description:   Patient will increase functional independence with ADLs by performing:    UE Dressing with Moderate Assistance.  LE Dressing with Moderate Assistance.  Toileting from bedside commode with Minimal Assistance for hygiene and clothing management.   Bathing from  edge of bed with Moderate Assistance.  Toilet transfer to bedside commode with Stand-by Assistance.  Left upper extremity exercise program x10 reps per handout, with supervision.                             Patient left up in chair with all lines intact, call button in reach, chair alarm on, and RN notified      Recommendations:     Discharge Recommendations:    Discharge Equipment Recommendations:  bath  bench, bedside commode  Barriers to discharge:  Decreased caregiver support      Time Tracking:     OT Date of Treatment: 04/10/24  OT Start Time: 0825  OT Stop Time: 0855  OT Total Time (min): 30 min    Billable Minutes:Evaluation 25  Therapeutic Exercise 10      4/10/2024

## 2024-04-10 NOTE — PLAN OF CARE
Problem: Adult Inpatient Plan of Care  Goal: Plan of Care Review  Outcome: Ongoing, Progressing  Goal: Patient-Specific Goal (Individualized)  Outcome: Ongoing, Progressing  Goal: Absence of Hospital-Acquired Illness or Injury  Outcome: Ongoing, Progressing  Goal: Optimal Comfort and Wellbeing  Outcome: Ongoing, Progressing  Goal: Readiness for Transition of Care  Outcome: Ongoing, Progressing     Problem: Diabetes Comorbidity  Goal: Blood Glucose Level Within Targeted Range  Outcome: Ongoing, Progressing     Problem: Behavioral Health Comorbidity  Goal: Maintenance of Behavioral Health Symptom Control  Outcome: Ongoing, Progressing     Problem: COPD (Chronic Obstructive Pulmonary Disease) Comorbidity  Goal: Maintenance of COPD Symptom Control  Outcome: Ongoing, Progressing     Problem: Obstructive Sleep Apnea Risk or Actual Comorbidity Management  Goal: Unobstructed Breathing During Sleep  Outcome: Ongoing, Progressing     Problem: Osteoarthritis Comorbidity  Goal: Maintenance of Osteoarthritis Symptom Control  Outcome: Ongoing, Progressing     Problem: Fall Injury Risk  Goal: Absence of Fall and Fall-Related Injury  Outcome: Ongoing, Progressing     Problem: Pain Acute  Goal: Acceptable Pain Control and Functional Ability  Outcome: Ongoing, Progressing     Problem: Gas Exchange Impaired  Goal: Optimal Gas Exchange  Outcome: Ongoing, Progressing     Problem: Adjustment to Surgery (Shoulder Arthroplasty)  Goal: Optimal Coping  Outcome: Ongoing, Progressing     Problem: Functional Ability Impaired (Shoulder Arthroplasty)  Goal: Optimal Functional Ability  Outcome: Ongoing, Progressing     Problem: Neurovascular Compromise (Shoulder Arthroplasty)  Goal: Intact Neurovascular Status  Outcome: Ongoing, Progressing     Problem: Pain (Shoulder Arthroplasty)  Goal: Acceptable Pain Control  Outcome: Ongoing, Progressing     Problem: Hypertension Comorbidity  Goal: Blood Pressure in Desired Range  Outcome: Ongoing,  Progressing

## 2024-04-10 NOTE — NURSING
Nurses Note -- 4 Eyes      4/10/2024   7:47 AM      Skin assessed during: Admit      [] No Altered Skin Integrity Present    []Prevention Measures Documented      [x] Yes- Altered Skin Integrity Present or Discovered   [] LDA Added if Not in Epic (Describe Wound)   [] New Altered Skin Integrity was Present on Admit and Documented in LDA   [x] Wound Image Taken    Wound Care Consulted? No    Attending Nurse:  Live Toussaint RN/Staff Member:  Yahir
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
## 2024-04-10 NOTE — SUBJECTIVE & OBJECTIVE
Past Medical History:   Diagnosis Date    Anxiety disorder, unspecified     COPD (chronic obstructive pulmonary disease)     Depression     Diabetes mellitus, type 2     Hypertension     YE (obstructive sleep apnea)     Diagnosed years ago-does not sleep with c-pap       Past Surgical History:   Procedure Laterality Date    CHOLECYSTECTOMY      COLONOSCOPY      CORONARY ARTERY BYPASS GRAFT  2017    HYSTERECTOMY      REVERSE TOTAL SHOULDER ARTHROPLASTY Left 4/3/2024    Procedure: ARTHROPLASTY, SHOULDER, TOTAL, REVERSE;  Surgeon: Watson Hennessy MD;  Location: [facility];  Service: Orthopedics;  Laterality: Left;    ROTATOR CUFF REPAIR Right 03/27/2017       Review of patient's allergies indicates:   Allergen Reactions    Sulfa (sulfonamide antibiotics)        No current facility-administered medications on file prior to encounter.     Current Outpatient Medications on File Prior to Encounter   Medication Sig    buPROPion (WELLBUTRIN SR) 150 MG TBSR 12 hr tablet Take 150 mg by mouth every morning.    doxepin (SINEQUAN) 10 MG capsule Take 10 mg by mouth every evening.    DULoxetine (CYMBALTA) 60 MG capsule Take 60 mg by mouth nightly. For restless legs    EScitalopram oxalate (LEXAPRO) 20 MG tablet Take 20 mg by mouth once daily.    insulin (BASAGLAR KWIKPEN U-100 INSULIN) glargine 100 units/mL SubQ pen Inject 52 Units into the skin every evening.    isosorbide mononitrate (IMDUR) 60 MG 24 hr tablet Take 60 mg by mouth once daily.    linaCLOtide (LINZESS) 145 mcg Cap capsule Take 145 mcg by mouth before breakfast.    losartan-hydrochlorothiazide 50-12.5 mg (HYZAAR) 50-12.5 mg per tablet Take 1 tablet by mouth once daily.    montelukast (SINGULAIR) 10 mg tablet Take 10 mg by mouth every evening.    propranoloL (INDERAL) 10 MG tablet Take 10 mg by mouth 3 (three) times daily.    VRAYLAR 1.5 mg Cap Take 1.5 mg by mouth once daily.    albuterol sulfate (PROAIR RESPICLICK) 90 mcg/actuation inhaler Inhale 2 puffs into the lungs  "every 6 (six) hours as needed for Wheezing. Rescue    albuterol-ipratropium (DUO-NEB) 2.5 mg-0.5 mg/3 mL nebulizer solution Take 3 mLs by nebulization every 6 (six) hours as needed for Wheezing. Rescue    BD KATY 2ND GEN PEN NEEDLE 32 gauge x /32" Ndle SMARTSIG:injection 3 Times Daily    cetirizine 10 mg chewable tablet Take 10 mg by mouth once daily.    diclofenac sodium (VOLTAREN) 1 % Gel daily as needed.    fluticasone propionate (FLONASE) 50 mcg/actuation nasal spray 1 spray by Each Nostril route 2 (two) times daily.    fluticasone-umeclidin-vilanter (TRELEGY ELLIPTA) 100-62.5-25 mcg DsDv Inhale 1 puff into the lungs once daily.    meclizine (ANTIVERT) 25 mg tablet Take 25 mg by mouth 3 (three) times daily as needed for Dizziness.    nitroGLYCERIN (NITROSTAT) 0.4 MG SL tablet Place 0.4 mg under the tongue every 5 (five) minutes as needed for Chest pain.    ondansetron (ZOFRAN-ODT) 8 MG TbDL Take 8 mg by mouth 3 (three) times daily as needed.    ONETOUCH ULTRA TEST Strp 3 (three) times daily.    potassium chloride (KLOR-CON) 8 MEQ TbSR Take 8 mEq by mouth once.     Family History       Problem Relation (Age of Onset)    No Known Problems Mother, Father          Tobacco Use    Smoking status: Former     Current packs/day: 0.00     Types: Cigarettes     Quit date:      Years since quittin.2    Smokeless tobacco: Never   Substance and Sexual Activity    Alcohol use: Never    Drug use: Never    Sexual activity: Not on file     Review of Systems   Constitutional:  Positive for activity change and fatigue. Negative for appetite change and fever.   Respiratory:  Negative for chest tightness and shortness of breath.    Cardiovascular:  Negative for chest pain and leg swelling.   Gastrointestinal:  Negative for abdominal distention, abdominal pain, constipation, diarrhea, nausea and vomiting.   Musculoskeletal:  Positive for arthralgias and myalgias.   Skin:  Negative for rash and wound.   Neurological:  " Positive for dizziness and weakness.     Objective:     Vital Signs (Most Recent):  Temp: 98.2 °F (36.8 °C) (04/10/24 0756)  Pulse: 66 (04/10/24 0827)  Resp: 18 (04/10/24 0756)  BP: (!) 143/78 (04/10/24 0827)  SpO2: 97 % (04/10/24 0756) Vital Signs (24h Range):  Temp:  [97.6 °F (36.4 °C)-98.2 °F (36.8 °C)] 98.2 °F (36.8 °C)  Pulse:  [66-77] 66  Resp:  [18-20] 18  SpO2:  [95 %-98 %] 97 %  BP: (143-197)/(78-85) 143/78     Weight: 93.5 kg (206 lb 2.1 oz)  Body mass index is 34.3 kg/m².     Physical Exam  Vitals and nursing note reviewed.   Constitutional:       General: She is not in acute distress.     Appearance: Normal appearance.   HENT:      Head: Normocephalic and atraumatic.      Nose: Nose normal.   Eyes:      Conjunctiva/sclera: Conjunctivae normal.   Cardiovascular:      Rate and Rhythm: Normal rate and regular rhythm.      Pulses: Normal pulses.      Heart sounds: Normal heart sounds. No murmur heard.     No gallop.   Pulmonary:      Effort: Pulmonary effort is normal.      Breath sounds: Normal breath sounds. No wheezing, rhonchi or rales.   Abdominal:      General: Bowel sounds are normal. There is no distension.      Palpations: Abdomen is soft.      Tenderness: There is no abdominal tenderness. There is no guarding.   Musculoskeletal:         General: No deformity.      Cervical back: Normal range of motion and neck supple.      Right lower leg: No edema.      Left lower leg: No edema.      Comments: Decreased ROM left shoulder     Skin:     General: Skin is warm and dry.      Findings: No rash.   Neurological:      General: No focal deficit present.      Mental Status: She is alert and oriented to person, place, and time. Mental status is at baseline.      Gait: Gait normal.   Psychiatric:         Mood and Affect: Mood normal.         Thought Content: Thought content normal.         Judgment: Judgment normal.                Significant Labs: All pertinent labs within the past 24 hours have been  reviewed.  Recent Lab Results         04/10/24  1044   04/10/24  0625   04/09/24  2106   04/09/24  1704        POCT Glucose 101   73   101   104               Significant Imaging: I have reviewed all pertinent imaging results/findings within the past 24 hours.

## 2024-04-10 NOTE — ASSESSMENT & PLAN NOTE
Chronic, controlled. Latest blood pressure and vitals reviewed-     Temp:  [97.6 °F (36.4 °C)-98.2 °F (36.8 °C)]   Pulse:  [66-77]   Resp:  [18-20]   BP: (143-197)/(78-85)   SpO2:  [95 %-98 %] .   Home meds for hypertension were reviewed and noted below.   Hypertension Medications               isosorbide mononitrate (IMDUR) 60 MG 24 hr tablet Take 60 mg by mouth once daily.    losartan-hydrochlorothiazide 50-12.5 mg (HYZAAR) 50-12.5 mg per tablet Take 1 tablet by mouth once daily.    nitroGLYCERIN (NITROSTAT) 0.4 MG SL tablet Place 0.4 mg under the tongue every 5 (five) minutes as needed for Chest pain.    propranoloL (INDERAL) 10 MG tablet Take 10 mg by mouth 3 (three) times daily.            While in the hospital, will manage blood pressure as follows; Continue home antihypertensive regimen    Will utilize p.r.n. blood pressure medication only if patient's blood pressure greater than 160/100 and she develops symptoms such as worsening chest pain or shortness of breath.   Additional Notes: Pain 0/10 Detail Level: Detailed Quality 130: Documentation Of Current Medications In The Medical Record: Current Medications Documented Quality 131: Pain Assessment And Follow-Up: Pain assessment using a standardized tool is documented as negative, no follow-up plan required

## 2024-04-10 NOTE — PT/OT/SLP PROGRESS
Physical Therapy Treatment Note           Name: Lazara Casillas    : 1950 (73 y.o.)  MRN: [id number]             Subjective Assessment:     No complaints  Lethargic   X Awake, alert, cooperative  Uncooperative    Agitated  c/o pain    Appropriate  c/o fatigue   X Confused  Treated at bedside     Emotionally labile  Treated in gym/dept.    Impulsive  Other:    Flat affect       Pain/Comfort:    Pain Rating 1: 0/10    Therapy Precautions:      Cognitive deficits  Spinal precautions    Collar - hard  Sternal precautions    Collar - soft   TLSO   X Fall risk  LSO    Hip precautions - posterior  Knee immobilizer    Hip precautions - anterior  WBAT    Impaired communication  Partial weightbearing    Oxygen  TTWB    PEG tube X NWB LUE    Visual deficits  Other:    Hearing deficits            Mobility Training:     Assist Level Assistive Device Comments    Bed Mobility      Sit to stand/Stand to sit SBA-CGA  Sit to stand/stand to sit from WC level.  Continued reminders required for adequate positioning prior to attempting to stand.     Transfers      Gait CGA  Pt able to ambulate 150 ft x 2 trials with a seated rest break between each. Pt ambulated without an AD and this appears to remain the safest option for pt due to her difficulty sequencing a single-limb AD.  Pt initially demonstrated short, shuffled steps, but this improved slightly with VC and increased gait distance.  Upon changing directions, pt did experience one LOB but was able to self correct.    Balance Training      Wheelchair Mobility      Stair Climbing      Car Transfer      Other:             Assessment:     Patient tolerated session fairly well and was able to ambulate without an AD.  As mentioned above, pt appears to mobilize more safely without the use of an AD due to her difficulty sequencing the HW/SBQC.  PT questioned pt regarding her reports of numerous falls in the past.  PT asked pt if she had ever seen neurology and pt reported  "that she has.  After further questioning, pt revealed that the consult was related to her headaches and not her h/o falls.  Pt is on meclizine for reports of "dizziness".  OT noted a tremor and possible increased tone in her R ankle.  PT feels that pt will likely benefit from a neurology consult upon D/C from this facility as her current symptomology is not related to her L shoulder surgery.          GOALS:   Multidisciplinary Problems       Physical Therapy Goals          Problem: Physical Therapy    Goal Priority Disciplines Outcome Goal Variances Interventions   Physical Therapy Goal     PT, PT/OT Ongoing, Progressing     Description: Patient will increase functional independence with mobility by performin. Supine to sit with Stand-by Assistance  2. Sit to supine with Stand-by Assistance  3. Sit to stand transfer with Supervision  4. Gait  x 250 feet with Supervision using No Assistive Device.   5. Ascend/descend 5 stair with right Handrails Minimal Assistance using No Assistive Device.                            Discharge Recommendations:      Home with  and 24 hour care    Discharge Equipment Recommendations:     shower chair     At end of treatment, patient remained:    X  Up in chair     In bed   X With alarm activated    Bed rails up   X Call bell in reach      Family/friends present     Restraints secured properly     In bathroom with CNA/RN notified     In gym with PT/PTA/tech     Nurse aware     Other:           PT Start Time: 0900     PT Stop Time: 923  PT Total Time (min): 23 min     Billable Minutes: Gait Training 23      04/10/2024    "

## 2024-04-10 NOTE — PLAN OF CARE
Problem: Adult Inpatient Plan of Care  Goal: Plan of Care Review  Outcome: Ongoing, Progressing  Goal: Patient-Specific Goal (Individualized)  Outcome: Ongoing, Progressing  Goal: Absence of Hospital-Acquired Illness or Injury  Outcome: Ongoing, Progressing  Goal: Optimal Comfort and Wellbeing  Outcome: Ongoing, Progressing  Goal: Readiness for Transition of Care  Outcome: Ongoing, Progressing     Problem: Diabetes Comorbidity  Goal: Blood Glucose Level Within Targeted Range  Outcome: Ongoing, Progressing     Problem: Behavioral Health Comorbidity  Goal: Maintenance of Behavioral Health Symptom Control  Outcome: Ongoing, Progressing     Problem: COPD (Chronic Obstructive Pulmonary Disease) Comorbidity  Goal: Maintenance of COPD Symptom Control  Outcome: Ongoing, Progressing     Problem: Obstructive Sleep Apnea Risk or Actual Comorbidity Management  Goal: Unobstructed Breathing During Sleep  Outcome: Ongoing, Progressing     Problem: Osteoarthritis Comorbidity  Goal: Maintenance of Osteoarthritis Symptom Control  Outcome: Ongoing, Progressing     Problem: Fall Injury Risk  Goal: Absence of Fall and Fall-Related Injury  Outcome: Ongoing, Progressing     Problem: Pain Acute  Goal: Acceptable Pain Control and Functional Ability  Outcome: Ongoing, Progressing     Problem: Gas Exchange Impaired  Goal: Optimal Gas Exchange  Outcome: Ongoing, Progressing     Problem: Adjustment to Surgery (Shoulder Arthroplasty)  Goal: Optimal Coping  Outcome: Ongoing, Progressing     Problem: Functional Ability Impaired (Shoulder Arthroplasty)  Goal: Optimal Functional Ability  Outcome: Ongoing, Progressing     Problem: Neurovascular Compromise (Shoulder Arthroplasty)  Goal: Intact Neurovascular Status  Outcome: Ongoing, Progressing     Problem: Pain (Shoulder Arthroplasty)  Goal: Acceptable Pain Control  Outcome: Ongoing, Progressing     Problem: Hypertension Comorbidity  Goal: Blood Pressure in Desired Range  Outcome: Ongoing,  Progressing     Problem: Impaired Wound Healing  Goal: Optimal Wound Healing  Outcome: Ongoing, Progressing

## 2024-04-10 NOTE — PLAN OF CARE
04/10/24 1021   Discharge Reassessment   Assessment Type Discharge Planning Reassessment   Did the patient's condition or plan change since previous assessment? No   Discharge Plan discussed with: Patient   Communicated DOMINGUEZ with patient/caregiver Date not available/Unable to determine   Discharge Plan A Home   Discharge Plan B Home Health   DME Needed Upon Discharge  walker, vinod   Transition of Care Barriers Mobility   Why the patient remains in the hospital Requires continued medical care   Post-Acute Status   Hospital Resources/Appts/Education Provided Provided education on problems/symptoms using teachback   Discharge Delays None known at this time

## 2024-04-11 LAB
POCT GLUCOSE: 178 MG/DL (ref 70–110)
POCT GLUCOSE: 68 MG/DL (ref 70–110)
POCT GLUCOSE: 96 MG/DL (ref 70–110)
POCT GLUCOSE: 98 MG/DL (ref 70–110)

## 2024-04-11 PROCEDURE — 97129 THER IVNTJ 1ST 15 MIN: CPT

## 2024-04-11 PROCEDURE — 97530 THERAPEUTIC ACTIVITIES: CPT

## 2024-04-11 PROCEDURE — 25000003 PHARM REV CODE 250: Performed by: INTERNAL MEDICINE

## 2024-04-11 PROCEDURE — 63600175 PHARM REV CODE 636 W HCPCS: Performed by: INTERNAL MEDICINE

## 2024-04-11 PROCEDURE — 94761 N-INVAS EAR/PLS OXIMETRY MLT: CPT

## 2024-04-11 PROCEDURE — 97116 GAIT TRAINING THERAPY: CPT

## 2024-04-11 PROCEDURE — 97535 SELF CARE MNGMENT TRAINING: CPT

## 2024-04-11 PROCEDURE — 97130 THER IVNTJ EA ADDL 15 MIN: CPT

## 2024-04-11 PROCEDURE — 25000003 PHARM REV CODE 250: Performed by: NURSE PRACTITIONER

## 2024-04-11 PROCEDURE — 94640 AIRWAY INHALATION TREATMENT: CPT

## 2024-04-11 PROCEDURE — 11000004 HC SNF PRIVATE

## 2024-04-11 PROCEDURE — 99900035 HC TECH TIME PER 15 MIN (STAT)

## 2024-04-11 PROCEDURE — 97110 THERAPEUTIC EXERCISES: CPT

## 2024-04-11 PROCEDURE — 25000242 PHARM REV CODE 250 ALT 637 W/ HCPCS: Performed by: INTERNAL MEDICINE

## 2024-04-11 RX ORDER — AMOXICILLIN 250 MG
1 CAPSULE ORAL DAILY PRN
Status: DISCONTINUED | OUTPATIENT
Start: 2024-04-11 | End: 2024-04-17 | Stop reason: HOSPADM

## 2024-04-11 RX ORDER — BUPROPION HYDROCHLORIDE 150 MG/1
150 TABLET, EXTENDED RELEASE ORAL DAILY
Status: DISCONTINUED | OUTPATIENT
Start: 2024-04-11 | End: 2024-04-17 | Stop reason: HOSPADM

## 2024-04-11 RX ADMIN — ASPIRIN 81 MG: 81 TABLET, COATED ORAL at 09:04

## 2024-04-11 RX ADMIN — MUPIROCIN 1 G: 20 OINTMENT TOPICAL at 09:04

## 2024-04-11 RX ADMIN — CEFADROXIL 500 MG: 500 CAPSULE ORAL at 09:04

## 2024-04-11 RX ADMIN — DULOXETINE HYDROCHLORIDE 60 MG: 30 CAPSULE, DELAYED RELEASE ORAL at 09:04

## 2024-04-11 RX ADMIN — METHOCARBAMOL TABLETS 500 MG: 500 TABLET, COATED ORAL at 09:04

## 2024-04-11 RX ADMIN — UMECLIDINIUM 62.5 MCG: 62.5 AEROSOL, POWDER ORAL at 06:04

## 2024-04-11 RX ADMIN — PROPRANOLOL HYDROCHLORIDE 10 MG: 10 TABLET ORAL at 02:04

## 2024-04-11 RX ADMIN — FLUTICASONE FUROATE AND VILANTEROL TRIFENATATE 1 PUFF: 100; 25 POWDER RESPIRATORY (INHALATION) at 06:04

## 2024-04-11 RX ADMIN — ESCITALOPRAM OXALATE 20 MG: 10 TABLET ORAL at 09:04

## 2024-04-11 RX ADMIN — BUPROPION HYDROCHLORIDE 150 MG: 150 TABLET, EXTENDED RELEASE ORAL at 10:04

## 2024-04-11 RX ADMIN — MONTELUKAST 10 MG: 10 TABLET, FILM COATED ORAL at 09:04

## 2024-04-11 RX ADMIN — ISOSORBIDE MONONITRATE 60 MG: 30 TABLET, EXTENDED RELEASE ORAL at 09:04

## 2024-04-11 RX ADMIN — LOSARTAN POTASSIUM 50 MG: 25 TABLET, FILM COATED ORAL at 09:04

## 2024-04-11 RX ADMIN — PROPRANOLOL HYDROCHLORIDE 10 MG: 10 TABLET ORAL at 09:04

## 2024-04-11 RX ADMIN — MUPIROCIN: 20 OINTMENT TOPICAL at 09:04

## 2024-04-11 RX ADMIN — DIAZEPAM 10 MG: 10 TABLET ORAL at 09:04

## 2024-04-11 RX ADMIN — INSULIN DETEMIR 52 UNITS: 100 INJECTION, SOLUTION SUBCUTANEOUS at 09:04

## 2024-04-11 RX ADMIN — CARIPRAZINE 1.5 MG: 1.5 CAPSULE, GELATIN COATED ORAL at 10:04

## 2024-04-11 RX ADMIN — HYDROCHLOROTHIAZIDE 12.5 MG: 12.5 TABLET ORAL at 09:04

## 2024-04-11 NOTE — PLAN OF CARE
Problem: Adult Inpatient Plan of Care  Goal: Plan of Care Review  Outcome: Ongoing, Progressing  Goal: Patient-Specific Goal (Individualized)  Outcome: Ongoing, Progressing  Goal: Absence of Hospital-Acquired Illness or Injury  Outcome: Ongoing, Progressing  Goal: Optimal Comfort and Wellbeing  Outcome: Ongoing, Progressing  Goal: Readiness for Transition of Care  Outcome: Ongoing, Progressing     Problem: Diabetes Comorbidity  Goal: Blood Glucose Level Within Targeted Range  Outcome: Ongoing, Progressing     Problem: Behavioral Health Comorbidity  Goal: Maintenance of Behavioral Health Symptom Control  Outcome: Ongoing, Progressing     Problem: COPD (Chronic Obstructive Pulmonary Disease) Comorbidity  Goal: Maintenance of COPD Symptom Control  Outcome: Ongoing, Progressing     Problem: Obstructive Sleep Apnea Risk or Actual Comorbidity Management  Goal: Unobstructed Breathing During Sleep  Outcome: Ongoing, Progressing     Problem: Osteoarthritis Comorbidity  Goal: Maintenance of Osteoarthritis Symptom Control  Outcome: Ongoing, Progressing     Problem: Hypertension Comorbidity  Goal: Blood Pressure in Desired Range  Outcome: Ongoing, Progressing     Problem: Fall Injury Risk  Goal: Absence of Fall and Fall-Related Injury  Outcome: Ongoing, Progressing     Problem: Pain Acute  Goal: Acceptable Pain Control and Functional Ability  Outcome: Ongoing, Progressing     Problem: Gas Exchange Impaired  Goal: Optimal Gas Exchange  Outcome: Ongoing, Progressing     Problem: Adjustment to Surgery (Shoulder Arthroplasty)  Goal: Optimal Coping  Outcome: Ongoing, Progressing     Problem: Functional Ability Impaired (Shoulder Arthroplasty)  Goal: Optimal Functional Ability  Outcome: Ongoing, Progressing     Problem: Neurovascular Compromise (Shoulder Arthroplasty)  Goal: Intact Neurovascular Status  Outcome: Ongoing, Progressing     Problem: Pain (Shoulder Arthroplasty)  Goal: Acceptable Pain Control  Outcome: Ongoing,  Progressing     Problem: Impaired Wound Healing  Goal: Optimal Wound Healing  Outcome: Ongoing, Progressing

## 2024-04-11 NOTE — PROGRESS NOTES
[facility] - Medical Surgical Unit  [facility] Medicine  Progress Note    Patient Name: Lazara Casillas  MRN: [id number]  Patient Class: IP- Swing   Admission Date: 4/9/2024  Length of Stay: 2 days  Attending Physician: Gris Bingham DO  Primary Care Provider: Stacey, Primary Doctor        Subjective:     Principal Problem:S/P rotator cuff repair        HPI:  72 yo CF admitted to swing bed on 4/9/24 for PT/OT s/p elective left reverse total shoulder arthroplasty on 4/4/24 by Dr. Watson Hennessy. PMH includes HTN, DMT2, anxiety, depression, frequent falls, constipation, allergies, COPD. Pt's  is at bedside. Medications reviewed with the pt. She reports that she was falling frequently at home due to weakness in her legs. She also has chronic dizziness/imbalance for which she takes meclezine 1-2 times daily. She last fell about 2 months ago when she injured her left shoulder. She currently denies SOB, CP, abd pain. Endorses b/l leg weakness and discomfort, along with left shoulder pain and decreased ROM.     Overview/Hospital Course:  4/10/24: Pt sitting up in chair at bedside. She states she is feeling good this morning. She denies chest discomfort or SOB. She worked with PT/OT this morning.    4/11/24: Pt sitting up in the chair at bedside. She has worked with PT already this AM. She states she is feeling fine. Denies SOB or CP. Does endorse pain in her left shoulder.         Past Medical History:   Diagnosis Date    Anxiety disorder, unspecified     COPD (chronic obstructive pulmonary disease)     Depression     Diabetes mellitus, type 2     Hypertension     YE (obstructive sleep apnea)     Diagnosed years ago-does not sleep with c-pap       Past Surgical History:   Procedure Laterality Date    CHOLECYSTECTOMY      COLONOSCOPY      CORONARY ARTERY BYPASS GRAFT  2017    HYSTERECTOMY      REVERSE TOTAL SHOULDER ARTHROPLASTY Left 4/3/2024    Procedure: ARTHROPLASTY, SHOULDER, TOTAL, REVERSE;  Surgeon: Watson Hennessy  "MD NICK;  [facility];  Service: Orthopedics;  Laterality: Left;    ROTATOR CUFF REPAIR Right 03/27/2017       Review of patient's allergies indicates:   Allergen Reactions    Sulfa (sulfonamide antibiotics)        No current facility-administered medications on file prior to encounter.     Current Outpatient Medications on File Prior to Encounter   Medication Sig    buPROPion (WELLBUTRIN SR) 150 MG TBSR 12 hr tablet Take 150 mg by mouth every morning.    doxepin (SINEQUAN) 10 MG capsule Take 10 mg by mouth every evening.    DULoxetine (CYMBALTA) 60 MG capsule Take 60 mg by mouth nightly. For restless legs    EScitalopram oxalate (LEXAPRO) 20 MG tablet Take 20 mg by mouth once daily.    insulin (BASAGLAR KWIKPEN U-100 INSULIN) glargine 100 units/mL SubQ pen Inject 52 Units into the skin every evening.    isosorbide mononitrate (IMDUR) 60 MG 24 hr tablet Take 60 mg by mouth once daily.    linaCLOtide (LINZESS) 145 mcg Cap capsule Take 145 mcg by mouth before breakfast.    losartan-hydrochlorothiazide 50-12.5 mg (HYZAAR) 50-12.5 mg per tablet Take 1 tablet by mouth once daily.    montelukast (SINGULAIR) 10 mg tablet Take 10 mg by mouth every evening.    propranoloL (INDERAL) 10 MG tablet Take 10 mg by mouth 3 (three) times daily.    VRAYLAR 1.5 mg Cap Take 1.5 mg by mouth once daily.    albuterol sulfate (PROAIR RESPICLICK) 90 mcg/actuation inhaler Inhale 2 puffs into the lungs every 6 (six) hours as needed for Wheezing. Rescue    albuterol-ipratropium (DUO-NEB) 2.5 mg-0.5 mg/3 mL nebulizer solution Take 3 mLs by nebulization every 6 (six) hours as needed for Wheezing. Rescue    BD KATY 2ND GEN PEN NEEDLE 32 gauge x 5/32" Ndle SMARTSIG:injection 3 Times Daily    cetirizine 10 mg chewable tablet Take 10 mg by mouth once daily.    diclofenac sodium (VOLTAREN) 1 % Gel daily as needed.    fluticasone propionate (FLONASE) 50 mcg/actuation nasal spray 1 spray by Each Nostril route 2 (two) times daily.    " fluticasone-umeclidin-vilanter (TRELEGY ELLIPTA) 100-62.5-25 mcg DsDv Inhale 1 puff into the lungs once daily.    meclizine (ANTIVERT) 25 mg tablet Take 25 mg by mouth 3 (three) times daily as needed for Dizziness.    nitroGLYCERIN (NITROSTAT) 0.4 MG SL tablet Place 0.4 mg under the tongue every 5 (five) minutes as needed for Chest pain.    ondansetron (ZOFRAN-ODT) 8 MG TbDL Take 8 mg by mouth 3 (three) times daily as needed.    ONETOUCH ULTRA TEST Strp 3 (three) times daily.    potassium chloride (KLOR-CON) 8 MEQ TbSR Take 8 mEq by mouth once.     Family History       Problem Relation (Age of Onset)    No Known Problems Mother, Father          Tobacco Use    Smoking status: Former     Current packs/day: 0.00     Types: Cigarettes     Quit date:      Years since quittin.2    Smokeless tobacco: Never   Substance and Sexual Activity    Alcohol use: Never    Drug use: Never    Sexual activity: Not on file     Review of Systems   Constitutional:  Positive for activity change and fatigue. Negative for appetite change and fever.   Respiratory:  Negative for chest tightness and shortness of breath.    Cardiovascular:  Negative for chest pain and leg swelling.   Gastrointestinal:  Negative for abdominal distention, abdominal pain, constipation, diarrhea, nausea and vomiting.   Musculoskeletal:  Positive for arthralgias and myalgias.   Skin:  Negative for rash and wound.   Neurological:  Positive for dizziness and weakness.     Objective:     Vital Signs (Most Recent):  Temp: 98.3 °F (36.8 °C) (24 0740)  Pulse: 60 (24 0740)  Resp: 18 (24 0638)  BP: 136/64 (24 0740)  SpO2: 95 % (24 0740) Vital Signs (24h Range):  Temp:  [97.6 °F (36.4 °C)-98.3 °F (36.8 °C)] 98.3 °F (36.8 °C)  Pulse:  [58-82] 60  Resp:  [16-18] 18  SpO2:  [95 %-100 %] 95 %  BP: (135-159)/(64-76) 136/64     Weight: 93.5 kg (206 lb 2.1 oz)  Body mass index is 34.3 kg/m².     Physical Exam  Vitals and nursing note reviewed.    Constitutional:       General: She is not in acute distress.     Appearance: Normal appearance.   HENT:      Head: Normocephalic and atraumatic.      Nose: Nose normal.   Eyes:      Conjunctiva/sclera: Conjunctivae normal.   Cardiovascular:      Rate and Rhythm: Normal rate and regular rhythm.      Pulses: Normal pulses.      Heart sounds: Normal heart sounds. No murmur heard.     No gallop.   Pulmonary:      Effort: Pulmonary effort is normal.      Breath sounds: Normal breath sounds. No wheezing, rhonchi or rales.   Abdominal:      General: Bowel sounds are normal. There is no distension.      Palpations: Abdomen is soft.      Tenderness: There is no abdominal tenderness. There is no guarding.   Musculoskeletal:         General: No deformity.      Cervical back: Normal range of motion and neck supple.      Right lower leg: No edema.      Left lower leg: No edema.      Comments: Decreased ROM left shoulder     Skin:     General: Skin is warm and dry.      Findings: No rash.   Neurological:      General: No focal deficit present.      Mental Status: She is alert and oriented to person, place, and time. Mental status is at baseline.      Gait: Gait normal.   Psychiatric:         Mood and Affect: Mood normal.         Thought Content: Thought content normal.         Judgment: Judgment normal.              Significant Labs: All pertinent labs within the past 24 hours have been reviewed.  Recent Lab Results         04/10/24  1937   04/10/24  1653        POCT Glucose 96   120               Significant Imaging: I have reviewed all pertinent imaging results/findings within the past 24 hours.    Assessment/Plan:      * S/P rotator cuff repair  Admit to swing bed.  PT/OT  Pain control.  ISS  Home/transfer medications reconciled.        Falls frequently  PT/OT      Hypertension  Chronic, controlled. Latest blood pressure and vitals reviewed-     Temp:  [97.6 °F (36.4 °C)-98.3 °F (36.8 °C)]   Pulse:  [60-82]   Resp:  [16-18]    BP: (135-159)/(64-76)   SpO2:  [95 %-100 %] .   Home meds for hypertension were reviewed and noted below.   Hypertension Medications               isosorbide mononitrate (IMDUR) 60 MG 24 hr tablet Take 60 mg by mouth once daily.    losartan-hydrochlorothiazide 50-12.5 mg (HYZAAR) 50-12.5 mg per tablet Take 1 tablet by mouth once daily.    nitroGLYCERIN (NITROSTAT) 0.4 MG SL tablet Place 0.4 mg under the tongue every 5 (five) minutes as needed for Chest pain.    propranoloL (INDERAL) 10 MG tablet Take 10 mg by mouth 3 (three) times daily.            While in the hospital, will manage blood pressure as follows; Continue home antihypertensive regimen    Will utilize p.r.n. blood pressure medication only if patient's blood pressure greater than 160/100 and she develops symptoms such as worsening chest pain or shortness of breath.    Diabetes mellitus, type 2  Patient's FSGs are controlled on current medication regimen.  Last A1c reviewed-   Lab Results   Component Value Date    HGBA1C 5.2 03/12/2024     Most recent fingerstick glucose reviewed-   Recent Labs   Lab 04/10/24  1653 04/10/24  1937 04/11/24  1019   POCTGLUCOSE 120* 96 98       Current correctional scale  moderate  Maintain anti-hyperglycemic dose as follows-   Antihyperglycemics (From admission, onward)      Start     Stop Route Frequency Ordered    04/09/24 2100  insulin detemir U-100 injection 52 Units         -- SubQ Nightly 04/09/24 1256    04/09/24 1107  insulin aspart U-100 injection 1-10 Units         -- SubQ Before meals & nightly PRN 04/09/24 1107          Hold Oral hypoglycemics while patient is in the hospital.    Depression  Home meds reconciled.        COPD (chronic obstructive pulmonary disease)  Home meds reconciled.    Anxiety disorder, unspecified  Home meds reconciled.      Traumatic incomplete tear of left rotator cuff  S/p repair    History of coronary artery disease  Home medications reconciled.      Primary osteoarthritis of left  shoulder          VTE Risk Mitigation (From admission, onward)           Ordered     Place JOE hose  Until discontinued        Comments: Remove at least daily for skin assessment    04/09/24 1109                    Discharge Planning   DOMINGUEZ:      Code Status: Not on file   Is the patient medically ready for discharge?:     Reason for patient still in hospital (select all that apply): Patient trending condition and PT / OT recommendations  Discharge Plan A: Home   Discharge Delays: None known at this time              SKYE COLEMAN DO  Department of Hospital Medicine   [facility]

## 2024-04-11 NOTE — PROGRESS NOTES
Inpatient Nutrition Assessment    Admit Date: 4/9/2024   Total duration of encounter: 2 days   Patient Age: 73 y.o.    Nutrition Recommendation/Prescription     Continue Diabetic/1800 kcal diet as tolerated  Weigh weekly  Will provide Yannick BID (90 kcal and 2.5 gm protein per serving)    Communication of Recommendations: reviewed with patient    Nutrition Assessment     Malnutrition Assessment/Nutrition-Focused Physical Exam    Malnutrition Context: other (see comments) (Does not meet criteria) (04/11/24 5976)                                                           A minimum of two characteristics is recommended for diagnosis of either severe or non-severe malnutrition.    Chart Review    Reason Seen: continuous nutrition monitoring/new swing bed    Malnutrition Screening Tool Results   Have you recently lost weight without trying?: No  Have you been eating poorly because of a decreased appetite?: No   MST Score: 0   Diagnosis:  S/p rotator cuff repair, falls fequently, HTN, Type 2 DM, depression, COPD, anxiety    Relevant Medical History:   DM, HTN, depression, anxiety d/o, COPD, YE, CAD, osteoarthritis of left shoulder    Scheduled Medications:  aspirin, 81 mg, BID  buPROPion, 150 mg, Daily  cariprazine, 1.5 mg, Daily  cefadroxil, 500 mg, Q12H  DULoxetine, 60 mg, QHS  EScitalopram oxalate, 20 mg, Daily  fluticasone furoate-vilanteroL, 1 puff, Daily   And  umeclidinium, 1 capsule, Daily  losartan, 50 mg, Daily   And  hydroCHLOROthiazide, 12.5 mg, Daily  insulin detemir U-100, 52 Units, QHS  isosorbide mononitrate, 60 mg, Daily  linaCLOtide, 145 mcg, Before breakfast  montelukast, 10 mg, QHS  mupirocin, , BID  propranoloL, 10 mg, TID    Continuous Infusions:   PRN Medications: acetaminophen, acetaminophen, albuterol, aluminum-magnesium hydroxide-simethicone, dextrose 10%, dextrose 10%, diazePAM, glucagon (human recombinant), glucose, glucose, insulin aspart U-100, meclizine, melatonin, methocarbamoL, ondansetron,  "senna-docusate 8.6-50 mg, tramadol    Calorie Containing IV Medications: no significant kcals from medications at this time    Recent Labs   Lab 24  0520      K 3.8   CALCIUM 8.7   CHLORIDE 100   CO2 29   BUN 16.7   CREATININE 0.88   EGFRNORACEVR >60   GLUCOSE 102   WBC 6.94   HGB 10.8*   HCT 33.3*     Nutrition Orders:  Diet diabetic 1800 Calorie      Appetite/Oral Intake: good/% of meals  Factors Affecting Nutritional Intake: none identified  Food/Caodaism/Cultural Preferences: none reported  Food Allergies: none reported  Last Bowel Movement: 04/10/24  Wound(s):  Intact    Comments    () Pt with good appetite and intake. Consumes % of meals. Denies N/V/C/D. Last BM this morning. Pt is edentulous with no c/o chewing or swallowing difficulties. Able to feed self with set-up assist. Pt reported # Wt gain noted. Pt stated she was drinking Yannick BID at home. RDN to order Yannick during hospital stay. Med/labs reviewed.       Anthropometrics    Height: 5' 5" (165.1 cm), Height Method: Stated  Last Weight: 93.5 kg (206 lb 2.1 oz) (24 1107), Weight Method: Bed Scale  BMI (Calculated): 34.3  BMI Classification: obese grade I (BMI 30-34.9)     Ideal Body Weight (IBW), Female: 125 lb     % Ideal Body Weight, Female (lb): 164.9 %                    Usual Body Weight (UBW), k.6 kg  % Usual Body Weight: 105.75     Usual Weight Provided By: patient    Wt Readings from Last 5 Encounters:   24 93.5 kg (206 lb 2.1 oz)   24 93.4 kg (206 lb)   24 91.2 kg (201 lb 1 oz)   24 91.2 kg (201 lb)   23 97 kg (213 lb 13.5 oz)     Weight Change(s) Since Admission:   Wt Readings from Last 1 Encounters:   24 1107 93.5 kg (206 lb 2.1 oz)   Admit Weight: 93.5 kg (206 lb 2.1 oz) (24 1107), Weight Method: Bed Scale    Estimated Needs    Weight Used For Calorie Calculations: 93.5 kg (206 lb 2.1 oz)  Energy Calorie Requirements (kcal): 1870 kcal (20 " kcal/kg/BW)  Energy Need Method: Kcal/kg  Weight Used For Protein Calculations: 93.5 kg (206 lb 2.1 oz)  Protein Requirements: 94 gm (1 gm/kg/BW)  Fluid Requirements (mL): 1870 ml (1 ml/kcal)    Enteral Nutrition     Patient not receiving enteral nutrition at this time.    Parenteral Nutrition     Patient not receiving parenteral nutrition support at this time.    Evaluation of Received Nutrient Intake    Calories: meeting estimated needs  Protein: meeting estimated needs    Patient Education     Not applicable.    Nutrition Diagnosis     PES: Overweight/obesity related to  excess energy intake  as evidenced by BMI 34.3. (new)         Nutrition Interventions     Intervention(s): general/healthful diet, multivitamin/mineral supplement therapy, and collaboration with other providers    Goal: Maintain weight throughout hospitalization. (new)      Nutrition Goals & Monitoring     Dietitian will monitor: energy intake, weight, electrolyte/renal panel, glucose/endocrine profile, and gastrointestinal profile    Nutrition Risk/Follow-Up: low (follow-up in 5-7 days)   Please consult if re-assessment needed sooner.

## 2024-04-11 NOTE — PT/OT/SLP PROGRESS
Physical Therapy Treatment Note           Name: Lazara Casillas    : 1950 (73 y.o.)  MRN: [id number]             Subjective Assessment:     No complaints  Lethargic   X Awake, alert, cooperative  Uncooperative    Agitated  c/o pain    Appropriate  c/o fatigue   X Confused  Treated at bedside     Emotionally labile  Treated in gym/dept.    Impulsive  Other:    Flat affect       Pain/Comfort:    Location - Side 1: Left  Location 1: shoulder    Therapy Precautions:     X Cognitive deficits  Spinal precautions    Collar - hard  Sternal precautions    Collar - soft   TLSO   X Fall risk  LSO    Hip precautions - posterior  Knee immobilizer    Hip precautions - anterior  WBAT    Impaired communication  Partial weightbearing    Oxygen  TTWB    PEG tube  NWB    Visual deficits  Other:    Hearing deficits               Mobility Training:     Assist Level Assistive Device Comments    Bed Mobility Sup-modA  Pt was initially able to mobilize into sitting at EOB with supervision only.  Pt demonstrated difficulty scooting anteriorly toward the EOB.  Pt experienced a posterior LOB and modA was required to return pt's trunk to upright.  VC provided for increased anterior weight shifting.    Sit to stand/Stand to sit Sup  Sit to stand/stand to sit. Pt stood from EOB and from WC level.     Transfers      Gait Sup  Pt able to ambulate 100 ft and 200 ft with a seated rest break between trials. Pt continues to demonstrate short, shuffled steps.  Very slow gait speed observed.  Overall, pt does continue to appear more confident with gait.  No overt LOB noted.  Poor directional and environmental awareness noted as pt tends to bump into objects and passed up her room.    Balance Training      Wheelchair Mobility      Stair Climbing CGA  Pt able to negotiate up and down 4 steps using the R sided railing.  CGA was provided for safety.  Pt descended the steps laterally in order to grasp the railing with her R hand.  Education  "provided regarding safety and technique.    Car Transfer      Other:           Assessment:     Patient tolerated session well and was able to practice step negotiation in preparation for D/C home.  Will attempt to practice this skill an additional time when spouse is present.  SLP present for co-treatment to observe pt's sequencing during functional mobility.  While in the therapy gym practicing the steps, pt reported "this is not the same set of steps you brought me to last time".  After further questioning, it was revealed that pt was under the impression that she was still at the facility in which she had surgery.  Despite this, pt did recall the ride here in the transportation van.  Cognition remains impaired.  Neuro consultation discussed with CM and MD.        GOALS:   Multidisciplinary Problems       Physical Therapy Goals          Problem: Physical Therapy    Goal Priority Disciplines Outcome Goal Variances Interventions   Physical Therapy Goal     PT, PT/OT Ongoing, Progressing     Description: Patient will increase functional independence with mobility by performin. Supine to sit with Stand-by Assistance  2. Sit to supine with Stand-by Assistance  3. Sit to stand transfer with Supervision  4. Gait  x 250 feet with Supervision using No Assistive Device.   5. Ascend/descend 5 stair with right Handrails Minimal Assistance using No Assistive Device.                            Discharge Recommendations:      Home with  and 24 hour care    Discharge Equipment Recommendations:     shower chair     At end of treatment, patient remained:    X  Up in chair     In bed   X With alarm activated    Bed rails up   X Call bell in reach      Family/friends present     Restraints secured properly     In bathroom with CNA/RN notified     In gym with PT/PTA/tech     Nurse aware     Other:           PT Start Time: 1310     PT Stop Time: 1338  PT Total Time (min): 28 min     Billable Minutes: Gait Training 18 and " Therapeutic Activity 10      04/11/2024

## 2024-04-11 NOTE — PT/OT/SLP PROGRESS
Physical Therapy Treatment Note           Name: Lazara Casillas    : 1950 (73 y.o.)  MRN: [id number]             Subjective Assessment:     No complaints  Lethargic   X Awake, alert, cooperative  Uncooperative    Agitated X c/o pain    Appropriate  c/o fatigue   X Confused  Treated at bedside     Emotionally labile  Treated in gym/dept.    Impulsive  Other:    Flat affect       Pain/Comfort:    Location - Side 1: Left  Location 1: shoulder    Therapy Precautions:     X Cognitive deficits  Spinal precautions    Collar - hard  Sternal precautions    Collar - soft   TLSO   X Fall risk  LSO    Hip precautions - posterior  Knee immobilizer    Hip precautions - anterior  WBAT    Impaired communication  Partial weightbearing    Oxygen  TTWB    PEG tube X NWB LUE in sling    Visual deficits  Other:    Hearing deficits               Mobility Training:     Assist Level Assistive Device Comments    Bed Mobility Sup  Semi-supine to sitting at EOB using the bed railing for assistance.  Pt able to anteriorly scoot toward the EOB on her own.  Marked improved compared to yesterday.   Sit to stand/Stand to sit Sup  Sit to stand/stand to sit performed from the EOB and from WC level.    Transfers Sup  Stand step t/fs performed from the EOB to the WC and the WC to the EOB x 2 trials. Pt demonstrated improved step length and balance.  No cueing required for safety.  Controlled descent observed along with appropriate hand placement.    Gait CGA  Pt able to ambulate 150 ft with a step to gait pattern progressing to step through with increased distance.  Gait speed remains slow and guarded, but pt experienced no overt LOB.  Overall, pt appeared more confident.  No unsteadiness noted with directional changes.    Balance Training      Wheelchair Mobility      Stair Climbing      Car Transfer      Other:             Assessment:     Patient tolerated session well and demonstrated improved balance as compared to yesterday's  "treatments. Pt appeared more confident in her ability and expressed such. Pt stated "when I woke up this morning and got up I said 'oh look I can do this'."  PT continues to recommend 24 hour care upon D/C from this facility.  A brief bout of HH followed up by outpatient PT recommended.  Will need to practice step negotiation and car transfers in preparation for D/C home.        GOALS:   Multidisciplinary Problems       Physical Therapy Goals          Problem: Physical Therapy    Goal Priority Disciplines Outcome Goal Variances Interventions   Physical Therapy Goal     PT, PT/OT Ongoing, Progressing     Description: Patient will increase functional independence with mobility by performin. Supine to sit with Stand-by Assistance  2. Sit to supine with Stand-by Assistance  3. Sit to stand transfer with Supervision  4. Gait  x 250 feet with Supervision using No Assistive Device.   5. Ascend/descend 5 stair with right Handrails Minimal Assistance using No Assistive Device.                            Discharge Recommendations:      Home with 24 hour care and HH initiatially    Discharge Equipment Recommendations:     shower chair     At end of treatment, patient remained:     X Up in chair     In bed   X With alarm activated    Bed rails up   X Call bell in reach      Family/friends present     Restraints secured properly     In bathroom with CNA/RN notified     In gym with PT/PTA/tech     Nurse aware    X Other:  OT arriving           PT Start Time: 837     PT Stop Time: 902  PT Total Time (min): 25 min     Billable Minutes: Gait Training 15 and Therapeutic Activity 10      2024    "

## 2024-04-11 NOTE — ASSESSMENT & PLAN NOTE
Chronic, controlled. Latest blood pressure and vitals reviewed-     Temp:  [97.6 °F (36.4 °C)-98.3 °F (36.8 °C)]   Pulse:  [60-82]   Resp:  [16-18]   BP: (135-159)/(64-76)   SpO2:  [95 %-100 %] .   Home meds for hypertension were reviewed and noted below.   Hypertension Medications               isosorbide mononitrate (IMDUR) 60 MG 24 hr tablet Take 60 mg by mouth once daily.    losartan-hydrochlorothiazide 50-12.5 mg (HYZAAR) 50-12.5 mg per tablet Take 1 tablet by mouth once daily.    nitroGLYCERIN (NITROSTAT) 0.4 MG SL tablet Place 0.4 mg under the tongue every 5 (five) minutes as needed for Chest pain.    propranoloL (INDERAL) 10 MG tablet Take 10 mg by mouth 3 (three) times daily.            While in the hospital, will manage blood pressure as follows; Continue home antihypertensive regimen    Will utilize p.r.n. blood pressure medication only if patient's blood pressure greater than 160/100 and she develops symptoms such as worsening chest pain or shortness of breath.

## 2024-04-11 NOTE — PT/OT/SLP PROGRESS
Occupational Therapy  Treatment    Name: Lazara Casillas    : 1950 (73 y.o.)  MRN: [id number]          TREATMENT SUMMARY AND RECOMMENDATIONS:      Subjective Assessment:   No complaints  Lethargic   x Awake, alert, cooperative  Uncooperative    Agitated x Flat affect   x Appropriate  c/o fatigue    Confused x Treated at bedside     Emotionally liable  Treated in gym/dept.    Impulsive x Other: Pt reported sleeping well last night and feeling better today       Pain/Comfort:  Pain Rating 1: 5/10  Location - Side 1: Left  Location 1: shoulder  Pain Addressed 1: Pre-medicate for activity, Reposition, Distraction      Therapy Precautions:   x Cognitive deficits  Spinal precautions    Collar - hard  Sternal precautions    Collar - soft   TLSO   x Fall risk  LSO    Hip precautions - posterior  Knee immobilizer    Hip precautions - anterior  WBAT    Impaired communication  Partial weightbearing    Oxygen  TTWB    PEG tube LUE NWB    Visual deficits x Other:LUE sling at all times.    Hearing deficits           Vitals Value   x Room air      Oxygen (L)     Blood pressure     Heart rate         Treatment Objectives:     Mobility Training:    Mobility task Assist level Comments    Bed mobility Min Transfer training sit>supine SBA then required min assist with upper trunk supine to sit from semi supine   Balance training     Transfer CGA Transfer training wc<>bed CGA/HHA for balance.  Verbal cuing required for proper hand placement   Functional mobility     Sit to stand transitions HHA    Other:       ADL Training:    ADL Assist level Comments    Feeding     Grooming/hygiene     Bathing     Upper body dressing Max ADL UE dressing training: pt required max assist to doff pull over night shirt and don pullover t-shirt after education on proper method.   Lower body dressing Max ADL LE dressing training: pt required max assist to doff pj bottoms and luiza sweat pants.   Toileting     Toilet transfer     Adaptive equipment  training     Other:           Additional Treatment:  After transfer to supine position, removed abduction sling.  Performed left elbow AAROM flex/ext to xu 1x20 reps followed by AROM forearm/wrist all planes 20 reps with verbal cuing.  After UE dressing, pt required max assist to re don sling.    Assessment: Patient tolerated session fair.  Pt continues to require maximal assist with dressing activity, donning brace, and performing LUE elbow/forearm/wrist ROM ex's.    OT Plan: Cont POC      GOALS:   Multidisciplinary Problems       Occupational Therapy Goals          Problem: Occupational Therapy    Goal Priority Disciplines Outcome Interventions   Occupational Therapy Goal     OT, PT/OT Ongoing, Progressing    Description:   Patient will increase functional independence with ADLs by performing:    UE Dressing with Moderate Assistance.  LE Dressing with Moderate Assistance.  Toileting from bedside commode with Minimal Assistance for hygiene and clothing management.   Bathing from  edge of bed with Moderate Assistance.  Toilet transfer to bedside commode with Stand-by Assistance.  Left upper extremity exercise program x10 reps per handout, with supervision.                         Communication with Treatment Team:     Discharge Recommendations:    Discharge Equipment Recommendations:  bath bench, bedside commode  Barriers to discharge:  Decreased caregiver support      At end of treatment, patient remained:  x Up in chair     In bed   x With alarm activated    Bed rails up   x Call bell in reach     Family/friends present    Restraints secured properly    In bathroom with CNA/RN notified    In gym with PT/PTA/tech   x Nurse aware    Other:         OT Date of Treatment: 04/11/24  OT Start Time: 0900  OT Stop Time: 0930  OT Total Time (min): 30 min    Billable Minutes:Self Care/Home Management 15  Therapeutic Exercise 15      4/11/2024

## 2024-04-11 NOTE — PT/OT/SLP PROGRESS
"Speech Language Pathology Treatment    Patient Name:  Lazara Casillas   MRN:  [id number]  Admitting Diagnosis: S/P rotator cuff repair    Recommendations:                 General Recommendations:  Cognitive-linguistic therapy  Diet recommendations:  Regular Diet - IDDSI Level 7, Liquid Diet Level: Thin liquids - IDDSI Level 0   Aspiration Precautions:  n/a    General Precautions: Standard, fall  Communication strategies:   repeat as needed    Assessment:     Lazara Casillas is a 73 y.o. female with an SLP diagnosis of Cognitive-Linguistic Impairment.  She presents with reduced executive function, working memory, and attention. Poor task switching.    Subjective     Pt participated well.     Patient goals: participate in IADLs at baseline level     Pain/Comfort:   No pain demonstrated or reported    Respiratory Status: Room air    Objective:     Further executive functioning assessment and Quality of Life questionnaire completed.     Brief Evaluation of Executive Functioning:  [employer] Drawing Test: spacing deficits only  The Controlled Oral Word Association Test: 10 /10 abstract (letters); repetition with concrete categories (animals)  Trail making Test. Part B, Oral version: unable to complete, Pt stated "I'm lost.     NEURO QOL Cog Function:  In the past 7 days: Pt response: (never, rarely, sometimes, often, or very often)   Reading difficulty Very often   Slow thinking Sometimes    Increased effort for attention Often   Trouble concentrating  Sometimes    How much difficulty: Pt response: (none, a little, somewhat, a lot, or cannot do)   Reading and following complex directions Somewhat   Planning/keeping appointments Somewhat   Managing time A little   Learning new tasks/instructions A little     [id number] Rylan Faustin and the [employer] on behalf of the [employer] [place] [employer] ([employer]).     Has the patient been evaluated by SLP for swallowing?   No  Keep patient " NPO? No   Current Respiratory Status:    room air     Goals:   Multidisciplinary Problems       SLP Goals          Problem: SLP    Goal Priority Disciplines Outcome   SLP Goal     SLP Ongoing, Progressing   Description: LT. Pt will use cognitive strategies for improved safety and communication.    STGs:  1. Pt will participate in further cognitive assessment and functional assessment with PT.   2. Pt will complete personally relevant executive functioning tasks with 90% acc min cues for improved independence with IADLs.                            Plan:     Patient to be seen:   (3-5x/week)   Plan of Care expires:  24  Plan of Care reviewed with:  patient, spouse   SLP Follow-Up:  Yes       Discharge recommendations:  Moderate Intensity Therapy   Barriers to Discharge:  Level of Skilled Assistance Needed   and Safety Awareness      Time Tracking:     SLP Treatment Date:   24  Speech Start Time:  0940  Speech Stop Time:  1450     Speech Total Time (min):  30 min    Billable Minutes: Cog Tx 30min / 2 units    Sonya Olsen MA, CCC-SLP  2024

## 2024-04-11 NOTE — SUBJECTIVE & OBJECTIVE
Past Medical History:   Diagnosis Date    Anxiety disorder, unspecified     COPD (chronic obstructive pulmonary disease)     Depression     Diabetes mellitus, type 2     Hypertension     YE (obstructive sleep apnea)     Diagnosed years ago-does not sleep with c-pap       Past Surgical History:   Procedure Laterality Date    CHOLECYSTECTOMY      COLONOSCOPY      CORONARY ARTERY BYPASS GRAFT  2017    HYSTERECTOMY      REVERSE TOTAL SHOULDER ARTHROPLASTY Left 4/3/2024    Procedure: ARTHROPLASTY, SHOULDER, TOTAL, REVERSE;  Surgeon: Watson Hennessy MD;  Location: [facility];  Service: Orthopedics;  Laterality: Left;    ROTATOR CUFF REPAIR Right 03/27/2017       Review of patient's allergies indicates:   Allergen Reactions    Sulfa (sulfonamide antibiotics)        No current facility-administered medications on file prior to encounter.     Current Outpatient Medications on File Prior to Encounter   Medication Sig    buPROPion (WELLBUTRIN SR) 150 MG TBSR 12 hr tablet Take 150 mg by mouth every morning.    doxepin (SINEQUAN) 10 MG capsule Take 10 mg by mouth every evening.    DULoxetine (CYMBALTA) 60 MG capsule Take 60 mg by mouth nightly. For restless legs    EScitalopram oxalate (LEXAPRO) 20 MG tablet Take 20 mg by mouth once daily.    insulin (BASAGLAR KWIKPEN U-100 INSULIN) glargine 100 units/mL SubQ pen Inject 52 Units into the skin every evening.    isosorbide mononitrate (IMDUR) 60 MG 24 hr tablet Take 60 mg by mouth once daily.    linaCLOtide (LINZESS) 145 mcg Cap capsule Take 145 mcg by mouth before breakfast.    losartan-hydrochlorothiazide 50-12.5 mg (HYZAAR) 50-12.5 mg per tablet Take 1 tablet by mouth once daily.    montelukast (SINGULAIR) 10 mg tablet Take 10 mg by mouth every evening.    propranoloL (INDERAL) 10 MG tablet Take 10 mg by mouth 3 (three) times daily.    VRAYLAR 1.5 mg Cap Take 1.5 mg by mouth once daily.    albuterol sulfate (PROAIR RESPICLICK) 90 mcg/actuation inhaler Inhale 2 puffs into the lungs  "every 6 (six) hours as needed for Wheezing. Rescue    albuterol-ipratropium (DUO-NEB) 2.5 mg-0.5 mg/3 mL nebulizer solution Take 3 mLs by nebulization every 6 (six) hours as needed for Wheezing. Rescue    BD KATY 2ND GEN PEN NEEDLE 32 gauge x /32" Ndle SMARTSIG:injection 3 Times Daily    cetirizine 10 mg chewable tablet Take 10 mg by mouth once daily.    diclofenac sodium (VOLTAREN) 1 % Gel daily as needed.    fluticasone propionate (FLONASE) 50 mcg/actuation nasal spray 1 spray by Each Nostril route 2 (two) times daily.    fluticasone-umeclidin-vilanter (TRELEGY ELLIPTA) 100-62.5-25 mcg DsDv Inhale 1 puff into the lungs once daily.    meclizine (ANTIVERT) 25 mg tablet Take 25 mg by mouth 3 (three) times daily as needed for Dizziness.    nitroGLYCERIN (NITROSTAT) 0.4 MG SL tablet Place 0.4 mg under the tongue every 5 (five) minutes as needed for Chest pain.    ondansetron (ZOFRAN-ODT) 8 MG TbDL Take 8 mg by mouth 3 (three) times daily as needed.    ONETOUCH ULTRA TEST Strp 3 (three) times daily.    potassium chloride (KLOR-CON) 8 MEQ TbSR Take 8 mEq by mouth once.     Family History       Problem Relation (Age of Onset)    No Known Problems Mother, Father          Tobacco Use    Smoking status: Former     Current packs/day: 0.00     Types: Cigarettes     Quit date:      Years since quittin.2    Smokeless tobacco: Never   Substance and Sexual Activity    Alcohol use: Never    Drug use: Never    Sexual activity: Not on file     Review of Systems   Constitutional:  Positive for activity change and fatigue. Negative for appetite change and fever.   Respiratory:  Negative for chest tightness and shortness of breath.    Cardiovascular:  Negative for chest pain and leg swelling.   Gastrointestinal:  Negative for abdominal distention, abdominal pain, constipation, diarrhea, nausea and vomiting.   Musculoskeletal:  Positive for arthralgias and myalgias.   Skin:  Negative for rash and wound.   Neurological:  " Positive for dizziness and weakness.     Objective:     Vital Signs (Most Recent):  Temp: 98.3 °F (36.8 °C) (04/11/24 0740)  Pulse: 60 (04/11/24 0740)  Resp: 18 (04/11/24 0638)  BP: 136/64 (04/11/24 0740)  SpO2: 95 % (04/11/24 0740) Vital Signs (24h Range):  Temp:  [97.6 °F (36.4 °C)-98.3 °F (36.8 °C)] 98.3 °F (36.8 °C)  Pulse:  [58-82] 60  Resp:  [16-18] 18  SpO2:  [95 %-100 %] 95 %  BP: (135-159)/(64-76) 136/64     Weight: 93.5 kg (206 lb 2.1 oz)  Body mass index is 34.3 kg/m².     Physical Exam  Vitals and nursing note reviewed.   Constitutional:       General: She is not in acute distress.     Appearance: Normal appearance.   HENT:      Head: Normocephalic and atraumatic.      Nose: Nose normal.   Eyes:      Conjunctiva/sclera: Conjunctivae normal.   Cardiovascular:      Rate and Rhythm: Normal rate and regular rhythm.      Pulses: Normal pulses.      Heart sounds: Normal heart sounds. No murmur heard.     No gallop.   Pulmonary:      Effort: Pulmonary effort is normal.      Breath sounds: Normal breath sounds. No wheezing, rhonchi or rales.   Abdominal:      General: Bowel sounds are normal. There is no distension.      Palpations: Abdomen is soft.      Tenderness: There is no abdominal tenderness. There is no guarding.   Musculoskeletal:         General: No deformity.      Cervical back: Normal range of motion and neck supple.      Right lower leg: No edema.      Left lower leg: No edema.      Comments: Decreased ROM left shoulder     Skin:     General: Skin is warm and dry.      Findings: No rash.   Neurological:      General: No focal deficit present.      Mental Status: She is alert and oriented to person, place, and time. Mental status is at baseline.      Gait: Gait normal.   Psychiatric:         Mood and Affect: Mood normal.         Thought Content: Thought content normal.         Judgment: Judgment normal.              Significant Labs: All pertinent labs within the past 24 hours have been  reviewed.  Recent Lab Results         04/10/24  1937   04/10/24  1653        POCT Glucose 96   120               Significant Imaging: I have reviewed all pertinent imaging results/findings within the past 24 hours.

## 2024-04-11 NOTE — PT/OT/SLP PROGRESS
Speech Language Pathology Treatment    Patient Name:  Lazara Casillas   MRN:  [id number]  Admitting Diagnosis: S/P rotator cuff repair    Recommendations:                 General Recommendations:  Cognitive-linguistic therapy  Diet recommendations:  Regular Diet - IDDSI Level 7, Liquid Diet Level: Thin liquids - IDDSI Level 0   Aspiration Precautions:  n/a    General Precautions: Standard, fall  Communication strategies:   repeat as needed    Assessment:     Lazara Casillas is a 73 y.o. female with an SLP diagnosis of Cognitive-Linguistic Impairment.  She presents with reduced executive function, working memory, and attention. Poor task switching.    Subjective     Pt participated well.     Patient goals: participate in IADLs at baseline level     Pain/Comfort:   No pain demonstrated or reported    Respiratory Status: Room air    Objective:     SLP competed functional assessment of cognitive skills while Pt completed mobility tasks with PT.     Pt unable to recall transition from [facility] hospital to current facility. Cueing required to find way to/from gym/room despite recent completion of route. Difficulty following through with strategies directed by PT although comprehension was demonstrated. Pt recalled tasks completed this AM with SLP, but difficulty recalling details of recent help with getting into bed.     Has the patient been evaluated by SLP for swallowing?   No  Keep patient NPO? No   Current Respiratory Status:    room air     Goals:   Multidisciplinary Problems       SLP Goals          Problem: SLP    Goal Priority Disciplines Outcome   SLP Goal     SLP Ongoing, Progressing   Description: LT. Pt will use cognitive strategies for improved safety and communication.    STGs:  1. Pt will participate in further cognitive assessment and functional assessment with PT.   2. Pt will complete personally relevant executive functioning tasks with 90% acc min cues for improved independence with IADLs.                             Plan:     Patient to be seen:   (3-5x/week)   Plan of Care expires:  04/24/24  Plan of Care reviewed with:  patient, spouse   SLP Follow-Up:  Yes       Discharge recommendations:  Moderate Intensity Therapy   Barriers to Discharge:  Level of Skilled Assistance Needed   and Safety Awareness      Time Tracking:     SLP Treatment Date:   04/11/24  Speech Start Time:  1310  Speech Stop Time:  1338     Speech Total Time (min):  28 min    Billable Minutes: Cog Tx 28min / 2 units    Sonya Olsen MA, CCC-SLP  04/11/2024

## 2024-04-12 LAB
POCT GLUCOSE: 112 MG/DL (ref 70–110)
POCT GLUCOSE: 127 MG/DL (ref 70–110)
POCT GLUCOSE: 67 MG/DL (ref 70–110)
POCT GLUCOSE: 92 MG/DL (ref 70–110)
POCT GLUCOSE: 98 MG/DL (ref 70–110)

## 2024-04-12 PROCEDURE — 94799 UNLISTED PULMONARY SVC/PX: CPT | Mod: XB

## 2024-04-12 PROCEDURE — 63600175 PHARM REV CODE 636 W HCPCS: Performed by: INTERNAL MEDICINE

## 2024-04-12 PROCEDURE — 97130 THER IVNTJ EA ADDL 15 MIN: CPT

## 2024-04-12 PROCEDURE — 97116 GAIT TRAINING THERAPY: CPT

## 2024-04-12 PROCEDURE — 25000003 PHARM REV CODE 250: Performed by: INTERNAL MEDICINE

## 2024-04-12 PROCEDURE — 94640 AIRWAY INHALATION TREATMENT: CPT

## 2024-04-12 PROCEDURE — 25000003 PHARM REV CODE 250: Performed by: NURSE PRACTITIONER

## 2024-04-12 PROCEDURE — 97530 THERAPEUTIC ACTIVITIES: CPT

## 2024-04-12 PROCEDURE — 11000004 HC SNF PRIVATE

## 2024-04-12 PROCEDURE — 97129 THER IVNTJ 1ST 15 MIN: CPT

## 2024-04-12 PROCEDURE — 99900035 HC TECH TIME PER 15 MIN (STAT)

## 2024-04-12 PROCEDURE — 94761 N-INVAS EAR/PLS OXIMETRY MLT: CPT

## 2024-04-12 PROCEDURE — 97110 THERAPEUTIC EXERCISES: CPT

## 2024-04-12 PROCEDURE — 27000221 HC OXYGEN, UP TO 24 HOURS

## 2024-04-12 RX ADMIN — PROPRANOLOL HYDROCHLORIDE 10 MG: 10 TABLET ORAL at 09:04

## 2024-04-12 RX ADMIN — ASPIRIN 81 MG: 81 TABLET, COATED ORAL at 09:04

## 2024-04-12 RX ADMIN — MUPIROCIN 1 G: 20 OINTMENT TOPICAL at 09:04

## 2024-04-12 RX ADMIN — HYDROCHLOROTHIAZIDE 12.5 MG: 12.5 TABLET ORAL at 09:04

## 2024-04-12 RX ADMIN — PROPRANOLOL HYDROCHLORIDE 10 MG: 10 TABLET ORAL at 03:04

## 2024-04-12 RX ADMIN — ESCITALOPRAM OXALATE 20 MG: 10 TABLET ORAL at 09:04

## 2024-04-12 RX ADMIN — FLUTICASONE FUROATE AND VILANTEROL TRIFENATATE 1 PUFF: 100; 25 POWDER RESPIRATORY (INHALATION) at 05:04

## 2024-04-12 RX ADMIN — MONTELUKAST 10 MG: 10 TABLET, FILM COATED ORAL at 09:04

## 2024-04-12 RX ADMIN — BUPROPION HYDROCHLORIDE 150 MG: 150 TABLET, EXTENDED RELEASE ORAL at 09:04

## 2024-04-12 RX ADMIN — CARIPRAZINE 1.5 MG: 1.5 CAPSULE, GELATIN COATED ORAL at 09:04

## 2024-04-12 RX ADMIN — UMECLIDINIUM 62.5 MCG: 62.5 AEROSOL, POWDER ORAL at 05:04

## 2024-04-12 RX ADMIN — DULOXETINE HYDROCHLORIDE 60 MG: 30 CAPSULE, DELAYED RELEASE ORAL at 09:04

## 2024-04-12 RX ADMIN — CEFADROXIL 500 MG: 500 CAPSULE ORAL at 09:04

## 2024-04-12 RX ADMIN — ACETAMINOPHEN 650 MG: 325 TABLET, FILM COATED ORAL at 09:04

## 2024-04-12 RX ADMIN — LOSARTAN POTASSIUM 50 MG: 25 TABLET, FILM COATED ORAL at 09:04

## 2024-04-12 RX ADMIN — INSULIN DETEMIR 52 UNITS: 100 INJECTION, SOLUTION SUBCUTANEOUS at 09:04

## 2024-04-12 RX ADMIN — MUPIROCIN: 20 OINTMENT TOPICAL at 09:04

## 2024-04-12 RX ADMIN — ISOSORBIDE MONONITRATE 60 MG: 30 TABLET, EXTENDED RELEASE ORAL at 09:04

## 2024-04-12 NOTE — ASSESSMENT & PLAN NOTE
Patient's FSGs are controlled on current medication regimen.  Last A1c reviewed-   Lab Results   Component Value Date    HGBA1C 5.2 03/12/2024     Most recent fingerstick glucose reviewed-   Recent Labs   Lab 04/11/24  1711 04/11/24  1941 04/12/24  0435   POCTGLUCOSE 178* 98 67*       Current correctional scale  moderate  Maintain anti-hyperglycemic dose as follows-   Antihyperglycemics (From admission, onward)    Start     Stop Route Frequency Ordered    04/09/24 2100  insulin detemir U-100 injection 52 Units         -- SubQ Nightly 04/09/24 1256    04/09/24 1107  insulin aspart U-100 injection 1-10 Units         -- SubQ Before meals & nightly PRN 04/09/24 1107        Hold Oral hypoglycemics while patient is in the hospital.

## 2024-04-12 NOTE — PROGRESS NOTES
[facility] - Medical Surgical Unit  [facility] Medicine  Progress Note    Patient Name: Lazara Casillas  MRN: [id number]  Patient Class: IP- Swing   Admission Date: 4/9/2024  Length of Stay: 3 days  Attending Physician: Gris Bingham DO  Primary Care Provider: Stacey, Primary Doctor        Subjective:     Principal Problem:S/P rotator cuff repair        HPI:  74 yo CF admitted to swing bed on 4/9/24 for PT/OT s/p elective left reverse total shoulder arthroplasty on 4/4/24 by Dr. Watson Hennessy. PMH includes HTN, DMT2, anxiety, depression, frequent falls, constipation, allergies, COPD. Pt's  is at bedside. Medications reviewed with the pt. She reports that she was falling frequently at home due to weakness in her legs. She also has chronic dizziness/imbalance for which she takes meclezine 1-2 times daily. She last fell about 2 months ago when she injured her left shoulder. She currently denies SOB, CP, abd pain. Endorses b/l leg weakness and discomfort, along with left shoulder pain and decreased ROM.     Overview/Hospital Course:  4/10/24: Pt sitting up in chair at bedside. She states she is feeling good this morning. She denies chest discomfort or SOB. She worked with PT/OT this morning.    4/11/24: Pt sitting up in the chair at bedside. She has worked with PT already this AM. She states she is feeling fine. Denies SOB or CP. Does endorse pain in her left shoulder.       4/12/24:Pt sitting up in chair at bedside. She has no complaints this morning. Plan is for DC Wednesday of next week, home with .    Past Medical History:   Diagnosis Date    Anxiety disorder, unspecified     COPD (chronic obstructive pulmonary disease)     Depression     Diabetes mellitus, type 2     Hypertension     YE (obstructive sleep apnea)     Diagnosed years ago-does not sleep with c-pap       Past Surgical History:   Procedure Laterality Date    CHOLECYSTECTOMY      COLONOSCOPY      CORONARY ARTERY BYPASS GRAFT  2017     "HYSTERECTOMY      REVERSE TOTAL SHOULDER ARTHROPLASTY Left 4/3/2024    Procedure: ARTHROPLASTY, SHOULDER, TOTAL, REVERSE;  Surgeon: Watson Hennessy MD;  Location: [facility];  Service: Orthopedics;  Laterality: Left;    ROTATOR CUFF REPAIR Right 03/27/2017       Review of patient's allergies indicates:   Allergen Reactions    Sulfa (sulfonamide antibiotics)        No current facility-administered medications on file prior to encounter.     Current Outpatient Medications on File Prior to Encounter   Medication Sig    buPROPion (WELLBUTRIN SR) 150 MG TBSR 12 hr tablet Take 150 mg by mouth every morning.    doxepin (SINEQUAN) 10 MG capsule Take 10 mg by mouth every evening.    DULoxetine (CYMBALTA) 60 MG capsule Take 60 mg by mouth nightly. For restless legs    EScitalopram oxalate (LEXAPRO) 20 MG tablet Take 20 mg by mouth once daily.    insulin (BASAGLAR KWIKPEN U-100 INSULIN) glargine 100 units/mL SubQ pen Inject 52 Units into the skin every evening.    isosorbide mononitrate (IMDUR) 60 MG 24 hr tablet Take 60 mg by mouth once daily.    linaCLOtide (LINZESS) 145 mcg Cap capsule Take 145 mcg by mouth before breakfast.    losartan-hydrochlorothiazide 50-12.5 mg (HYZAAR) 50-12.5 mg per tablet Take 1 tablet by mouth once daily.    montelukast (SINGULAIR) 10 mg tablet Take 10 mg by mouth every evening.    propranoloL (INDERAL) 10 MG tablet Take 10 mg by mouth 3 (three) times daily.    VRAYLAR 1.5 mg Cap Take 1.5 mg by mouth once daily.    albuterol sulfate (PROAIR RESPICLICK) 90 mcg/actuation inhaler Inhale 2 puffs into the lungs every 6 (six) hours as needed for Wheezing. Rescue    albuterol-ipratropium (DUO-NEB) 2.5 mg-0.5 mg/3 mL nebulizer solution Take 3 mLs by nebulization every 6 (six) hours as needed for Wheezing. Rescue    BD KATY 2ND GEN PEN NEEDLE 32 gauge x 5/32" Ndle SMARTSIG:injection 3 Times Daily    cetirizine 10 mg chewable tablet Take 10 mg by mouth once daily.    diclofenac sodium (VOLTAREN) 1 % Gel daily as " needed.    fluticasone propionate (FLONASE) 50 mcg/actuation nasal spray 1 spray by Each Nostril route 2 (two) times daily.    fluticasone-umeclidin-vilanter (TRELEGY ELLIPTA) 100-62.5-25 mcg DsDv Inhale 1 puff into the lungs once daily.    meclizine (ANTIVERT) 25 mg tablet Take 25 mg by mouth 3 (three) times daily as needed for Dizziness.    nitroGLYCERIN (NITROSTAT) 0.4 MG SL tablet Place 0.4 mg under the tongue every 5 (five) minutes as needed for Chest pain.    ondansetron (ZOFRAN-ODT) 8 MG TbDL Take 8 mg by mouth 3 (three) times daily as needed.    ONETOUCH ULTRA TEST Strp 3 (three) times daily.    potassium chloride (KLOR-CON) 8 MEQ TbSR Take 8 mEq by mouth once.     Family History       Problem Relation (Age of Onset)    No Known Problems Mother, Father          Tobacco Use    Smoking status: Former     Current packs/day: 0.00     Types: Cigarettes     Quit date:      Years since quittin.2    Smokeless tobacco: Never   Substance and Sexual Activity    Alcohol use: Never    Drug use: Never    Sexual activity: Not on file     Review of Systems   Constitutional:  Positive for activity change and fatigue. Negative for appetite change and fever.   Respiratory:  Negative for chest tightness and shortness of breath.    Cardiovascular:  Negative for chest pain and leg swelling.   Gastrointestinal:  Negative for abdominal distention, abdominal pain, constipation, diarrhea, nausea and vomiting.   Musculoskeletal:  Positive for arthralgias and myalgias.   Skin:  Negative for rash and wound.   Neurological:  Positive for dizziness and weakness.     Objective:     Vital Signs (Most Recent):  Temp: 97.4 °F (36.3 °C) (24 075)  Pulse: 69 (24 0755)  Resp: 20 (24 075)  BP: (!) 156/73 (24 0755)  SpO2: 99 % (24 075) Vital Signs (24h Range):  Temp:  [97.4 °F (36.3 °C)-97.8 °F (36.6 °C)] 97.4 °F (36.3 °C)  Pulse:  [60-71] 69  Resp:  [18-20] 20  SpO2:  [95 %-99 %] 99 %  BP:  (100-169)/(60-83) 156/73     Weight: 93.5 kg (206 lb 2.1 oz)  Body mass index is 34.3 kg/m².     Physical Exam  Vitals and nursing note reviewed.   Constitutional:       General: She is not in acute distress.     Appearance: Normal appearance.   HENT:      Head: Normocephalic and atraumatic.      Nose: Nose normal.   Eyes:      Conjunctiva/sclera: Conjunctivae normal.   Cardiovascular:      Rate and Rhythm: Normal rate and regular rhythm.      Pulses: Normal pulses.      Heart sounds: Normal heart sounds. No murmur heard.     No gallop.   Pulmonary:      Effort: Pulmonary effort is normal.      Breath sounds: Normal breath sounds. No wheezing, rhonchi or rales.   Abdominal:      General: Bowel sounds are normal. There is no distension.      Palpations: Abdomen is soft.      Tenderness: There is no abdominal tenderness. There is no guarding.   Musculoskeletal:         General: No deformity.      Cervical back: Normal range of motion and neck supple.      Right lower leg: Edema present.      Left lower leg: Edema present.      Comments: Decreased ROM left shoulder.  Pretibial edema 1+     Skin:     General: Skin is warm and dry.      Findings: No rash.   Neurological:      General: No focal deficit present.      Mental Status: She is alert and oriented to person, place, and time. Mental status is at baseline.      Gait: Gait normal.   Psychiatric:         Mood and Affect: Mood normal.         Thought Content: Thought content normal.         Judgment: Judgment normal.                Significant Labs: All pertinent labs within the past 24 hours have been reviewed.  Recent Lab Results         04/12/24  0435   04/11/24  1941   04/11/24  1711        POCT Glucose 67   98   178               Significant Imaging: I have reviewed all pertinent imaging results/findings within the past 24 hours.    Assessment/Plan:      * S/P rotator cuff repair  Admit to swing bed.  PT/OT  Pain control.  ISS  Home/transfer medications  reconciled.        Falls frequently  PT/OT      Hypertension  Chronic, controlled. Latest blood pressure and vitals reviewed-     Temp:  [97.4 °F (36.3 °C)-97.8 °F (36.6 °C)]   Pulse:  [60-71]   Resp:  [18-20]   BP: (100-169)/(60-83)   SpO2:  [95 %-99 %] .   Home meds for hypertension were reviewed and noted below.   Hypertension Medications               isosorbide mononitrate (IMDUR) 60 MG 24 hr tablet Take 60 mg by mouth once daily.    losartan-hydrochlorothiazide 50-12.5 mg (HYZAAR) 50-12.5 mg per tablet Take 1 tablet by mouth once daily.    nitroGLYCERIN (NITROSTAT) 0.4 MG SL tablet Place 0.4 mg under the tongue every 5 (five) minutes as needed for Chest pain.    propranoloL (INDERAL) 10 MG tablet Take 10 mg by mouth 3 (three) times daily.            While in the hospital, will manage blood pressure as follows; Continue home antihypertensive regimen    Will utilize p.r.n. blood pressure medication only if patient's blood pressure greater than 160/100 and she develops symptoms such as worsening chest pain or shortness of breath.    Diabetes mellitus, type 2  Patient's FSGs are controlled on current medication regimen.  Last A1c reviewed-   Lab Results   Component Value Date    HGBA1C 5.2 03/12/2024     Most recent fingerstick glucose reviewed-   Recent Labs   Lab 04/11/24  1711 04/11/24  1941 04/12/24  0435   POCTGLUCOSE 178* 98 67*       Current correctional scale  moderate  Maintain anti-hyperglycemic dose as follows-   Antihyperglycemics (From admission, onward)      Start     Stop Route Frequency Ordered    04/09/24 2100  insulin detemir U-100 injection 52 Units         -- SubQ Nightly 04/09/24 1256    04/09/24 1107  insulin aspart U-100 injection 1-10 Units         -- SubQ Before meals & nightly PRN 04/09/24 1107          Hold Oral hypoglycemics while patient is in the hospital.    Depression  Home meds reconciled.        COPD (chronic obstructive pulmonary disease)  Home meds reconciled.    Anxiety  disorder, unspecified  Home meds reconciled.      Traumatic incomplete tear of left rotator cuff  S/p repair    History of coronary artery disease  Home medications reconciled.      Primary osteoarthritis of left shoulder          VTE Risk Mitigation (From admission, onward)           Ordered     Place JOE hose  Until discontinued        Comments: Remove at least daily for skin assessment    04/09/24 1107                    Discharge Planning   DOMINGUEZ: 4/17/2024     Code Status: Not on file   Is the patient medically ready for discharge?:     Reason for patient still in hospital (select all that apply): Patient trending condition and PT / OT recommendations  Discharge Plan A: Home   Discharge Delays: None known at this time              SKYE COLEMAN DO  Department of Hospital Medicine   [facility] - Medical Surgical Unit

## 2024-04-12 NOTE — ASSESSMENT & PLAN NOTE
Chronic, controlled. Latest blood pressure and vitals reviewed-     Temp:  [97.4 °F (36.3 °C)-97.8 °F (36.6 °C)]   Pulse:  [60-71]   Resp:  [18-20]   BP: (100-169)/(60-83)   SpO2:  [95 %-99 %] .   Home meds for hypertension were reviewed and noted below.   Hypertension Medications               isosorbide mononitrate (IMDUR) 60 MG 24 hr tablet Take 60 mg by mouth once daily.    losartan-hydrochlorothiazide 50-12.5 mg (HYZAAR) 50-12.5 mg per tablet Take 1 tablet by mouth once daily.    nitroGLYCERIN (NITROSTAT) 0.4 MG SL tablet Place 0.4 mg under the tongue every 5 (five) minutes as needed for Chest pain.    propranoloL (INDERAL) 10 MG tablet Take 10 mg by mouth 3 (three) times daily.            While in the hospital, will manage blood pressure as follows; Continue home antihypertensive regimen    Will utilize p.r.n. blood pressure medication only if patient's blood pressure greater than 160/100 and she develops symptoms such as worsening chest pain or shortness of breath.

## 2024-04-12 NOTE — PT/OT/SLP PROGRESS
Occupational Therapy  Treatment    Name: Lazara Casillas    : 1950 (73 y.o.)  MRN: [id number]          TREATMENT SUMMARY AND RECOMMENDATIONS:      Subjective Assessment:   No complaints  Lethargic   x Awake, alert, cooperative  Uncooperative    Agitated x Flat affect   x Appropriate  c/o fatigue    Confused x Treated at bedside     Emotionally liable  Treated in gym/dept.    Impulsive x Other: Pt reported sleeping well last night and feeling better everyday       Pain/Comfort:  Pain Rating 1: 4/10  Location - Side 1: Left  Location 1: shoulder  Pain Addressed 1: Reposition, Distraction, Pre-medicate for activity      Therapy Precautions:   x Cognitive deficits  Spinal precautions    Collar - hard  Sternal precautions    Collar - soft   TLSO   x Fall risk  LSO    Hip precautions - posterior  Knee immobilizer    Hip precautions - anterior  WBAT    Impaired communication  Partial weightbearing    Oxygen  TTWB    PEG tube LUE NWB    Visual deficits x Other:LUE sling at all times.    Hearing deficits           Vitals Value   x Room air      Oxygen (L)     Blood pressure     Heart rate         Treatment Objectives:     Mobility Training:    Mobility task Assist level Comments    Bed mobility Min Transfer training sit>supine SBA then required min assist with upper trunk supine to sit from semi supine   Balance training     Transfer CGA Transfer training wc<>bed CGA/HHA for balance.  Verbal cuing required for proper hand placement   Functional mobility     Sit to stand transitions HHA    Other:       ADL Training:    ADL Assist level Comments    Feeding     Grooming/hygiene     Bathing     Upper body dressing     Lower body dressing     Toileting     Toilet transfer     Adaptive equipment training     Other:           Additional Treatment:  While sitting at EOB, removed abduction sling with min assist.  Performed left elbow AAROM flex/ext to xu 1x20 reps followed by AROM forearm/wrist all planes 20 reps with verbal  cuing. Pt required max assist to re-don sling.    Assessment: Patient tolerated session well.  Noted increase in left elbow ROM and edema is gradually decreasing.  Will begin family training with  on Monday for possible dc next Wend.    OT Plan: Cont POC      GOALS:   Multidisciplinary Problems       Occupational Therapy Goals          Problem: Occupational Therapy    Goal Priority Disciplines Outcome Interventions   Occupational Therapy Goal     OT, PT/OT Ongoing, Progressing    Description:   Patient will increase functional independence with ADLs by performing:    UE Dressing with Moderate Assistance.  LE Dressing with Moderate Assistance.  Toileting from bedside commode with Minimal Assistance for hygiene and clothing management.   Bathing from  edge of bed with Moderate Assistance.  Toilet transfer to bedside commode with Stand-by Assistance.  Left upper extremity exercise program x10 reps per handout, with supervision.                         Communication with Treatment Team:     Discharge Recommendations:    Discharge Equipment Recommendations:  bath bench, bedside commode  Barriers to discharge:  Decreased caregiver support      At end of treatment, patient remained:  x Up in chair     In bed   x With alarm activated    Bed rails up   x Call bell in reach     Family/friends present    Restraints secured properly    In bathroom with CNA/RN notified    In gym with PT/PTA/tech   x Nurse aware    Other:         OT Date of Treatment: 04/12/24  OT Start Time: 0900  OT Stop Time: 0925  OT Total Time (min): 25 min    Billable Minutes: Therapeutic activity: 20 mins    4/12/2024

## 2024-04-12 NOTE — SUBJECTIVE & OBJECTIVE
Past Medical History:   Diagnosis Date    Anxiety disorder, unspecified     COPD (chronic obstructive pulmonary disease)     Depression     Diabetes mellitus, type 2     Hypertension     YE (obstructive sleep apnea)     Diagnosed years ago-does not sleep with c-pap       Past Surgical History:   Procedure Laterality Date    CHOLECYSTECTOMY      COLONOSCOPY      CORONARY ARTERY BYPASS GRAFT  2017    HYSTERECTOMY      REVERSE TOTAL SHOULDER ARTHROPLASTY Left 4/3/2024    Procedure: ARTHROPLASTY, SHOULDER, TOTAL, REVERSE;  Surgeon: Watson Hennessy MD;  Location: [facility];  Service: Orthopedics;  Laterality: Left;    ROTATOR CUFF REPAIR Right 03/27/2017       Review of patient's allergies indicates:   Allergen Reactions    Sulfa (sulfonamide antibiotics)        No current facility-administered medications on file prior to encounter.     Current Outpatient Medications on File Prior to Encounter   Medication Sig    buPROPion (WELLBUTRIN SR) 150 MG TBSR 12 hr tablet Take 150 mg by mouth every morning.    doxepin (SINEQUAN) 10 MG capsule Take 10 mg by mouth every evening.    DULoxetine (CYMBALTA) 60 MG capsule Take 60 mg by mouth nightly. For restless legs    EScitalopram oxalate (LEXAPRO) 20 MG tablet Take 20 mg by mouth once daily.    insulin (BASAGLAR KWIKPEN U-100 INSULIN) glargine 100 units/mL SubQ pen Inject 52 Units into the skin every evening.    isosorbide mononitrate (IMDUR) 60 MG 24 hr tablet Take 60 mg by mouth once daily.    linaCLOtide (LINZESS) 145 mcg Cap capsule Take 145 mcg by mouth before breakfast.    losartan-hydrochlorothiazide 50-12.5 mg (HYZAAR) 50-12.5 mg per tablet Take 1 tablet by mouth once daily.    montelukast (SINGULAIR) 10 mg tablet Take 10 mg by mouth every evening.    propranoloL (INDERAL) 10 MG tablet Take 10 mg by mouth 3 (three) times daily.    VRAYLAR 1.5 mg Cap Take 1.5 mg by mouth once daily.    albuterol sulfate (PROAIR RESPICLICK) 90 mcg/actuation inhaler Inhale 2 puffs into the lungs  "every 6 (six) hours as needed for Wheezing. Rescue    albuterol-ipratropium (DUO-NEB) 2.5 mg-0.5 mg/3 mL nebulizer solution Take 3 mLs by nebulization every 6 (six) hours as needed for Wheezing. Rescue    BD KATY 2ND GEN PEN NEEDLE 32 gauge x /32" Ndle SMARTSIG:injection 3 Times Daily    cetirizine 10 mg chewable tablet Take 10 mg by mouth once daily.    diclofenac sodium (VOLTAREN) 1 % Gel daily as needed.    fluticasone propionate (FLONASE) 50 mcg/actuation nasal spray 1 spray by Each Nostril route 2 (two) times daily.    fluticasone-umeclidin-vilanter (TRELEGY ELLIPTA) 100-62.5-25 mcg DsDv Inhale 1 puff into the lungs once daily.    meclizine (ANTIVERT) 25 mg tablet Take 25 mg by mouth 3 (three) times daily as needed for Dizziness.    nitroGLYCERIN (NITROSTAT) 0.4 MG SL tablet Place 0.4 mg under the tongue every 5 (five) minutes as needed for Chest pain.    ondansetron (ZOFRAN-ODT) 8 MG TbDL Take 8 mg by mouth 3 (three) times daily as needed.    ONETOUCH ULTRA TEST Strp 3 (three) times daily.    potassium chloride (KLOR-CON) 8 MEQ TbSR Take 8 mEq by mouth once.     Family History       Problem Relation (Age of Onset)    No Known Problems Mother, Father          Tobacco Use    Smoking status: Former     Current packs/day: 0.00     Types: Cigarettes     Quit date:      Years since quittin.2    Smokeless tobacco: Never   Substance and Sexual Activity    Alcohol use: Never    Drug use: Never    Sexual activity: Not on file     Review of Systems   Constitutional:  Positive for activity change and fatigue. Negative for appetite change and fever.   Respiratory:  Negative for chest tightness and shortness of breath.    Cardiovascular:  Negative for chest pain and leg swelling.   Gastrointestinal:  Negative for abdominal distention, abdominal pain, constipation, diarrhea, nausea and vomiting.   Musculoskeletal:  Positive for arthralgias and myalgias.   Skin:  Negative for rash and wound.   Neurological:  " Positive for dizziness and weakness.     Objective:     Vital Signs (Most Recent):  Temp: 97.4 °F (36.3 °C) (04/12/24 0755)  Pulse: 69 (04/12/24 0755)  Resp: 20 (04/12/24 0755)  BP: (!) 156/73 (04/12/24 0755)  SpO2: 99 % (04/12/24 0755) Vital Signs (24h Range):  Temp:  [97.4 °F (36.3 °C)-97.8 °F (36.6 °C)] 97.4 °F (36.3 °C)  Pulse:  [60-71] 69  Resp:  [18-20] 20  SpO2:  [95 %-99 %] 99 %  BP: (100-169)/(60-83) 156/73     Weight: 93.5 kg (206 lb 2.1 oz)  Body mass index is 34.3 kg/m².     Physical Exam  Vitals and nursing note reviewed.   Constitutional:       General: She is not in acute distress.     Appearance: Normal appearance.   HENT:      Head: Normocephalic and atraumatic.      Nose: Nose normal.   Eyes:      Conjunctiva/sclera: Conjunctivae normal.   Cardiovascular:      Rate and Rhythm: Normal rate and regular rhythm.      Pulses: Normal pulses.      Heart sounds: Normal heart sounds. No murmur heard.     No gallop.   Pulmonary:      Effort: Pulmonary effort is normal.      Breath sounds: Normal breath sounds. No wheezing, rhonchi or rales.   Abdominal:      General: Bowel sounds are normal. There is no distension.      Palpations: Abdomen is soft.      Tenderness: There is no abdominal tenderness. There is no guarding.   Musculoskeletal:         General: No deformity.      Cervical back: Normal range of motion and neck supple.      Right lower leg: Edema present.      Left lower leg: Edema present.      Comments: Decreased ROM left shoulder.  Pretibial edema 1+     Skin:     General: Skin is warm and dry.      Findings: No rash.   Neurological:      General: No focal deficit present.      Mental Status: She is alert and oriented to person, place, and time. Mental status is at baseline.      Gait: Gait normal.   Psychiatric:         Mood and Affect: Mood normal.         Thought Content: Thought content normal.         Judgment: Judgment normal.                Significant Labs: All pertinent labs within the  past 24 hours have been reviewed.  Recent Lab Results         04/12/24  0435   04/11/24  1941   04/11/24  1711        POCT Glucose 67   98   178               Significant Imaging: I have reviewed all pertinent imaging results/findings within the past 24 hours.

## 2024-04-12 NOTE — PT/OT/SLP PROGRESS
Physical Therapy Treatment Note           Name: Lazara Casillas    : 1950 (73 y.o.)  MRN: [id number]             Subjective Assessment:     No complaints  Lethargic   X Awake, alert, cooperative  Uncooperative    Agitated  c/o pain    Appropriate  c/o fatigue   X Confused  Treated at bedside     Emotionally labile  Treated in gym/dept.    Impulsive  Other:    Flat affect       Pain/Comfort:    Pain Rating 1: 0/10    Therapy Precautions:     X Cognitive deficits  Spinal precautions    Collar - hard  Sternal precautions    Collar - soft   TLSO   X Fall risk  LSO    Hip precautions - posterior  Knee immobilizer    Hip precautions - anterior  WBAT    Impaired communication  Partial weightbearing    Oxygen  TTWB    PEG tube X NWB LUE in sling    Visual deficits  Other:    Hearing deficits               Mobility Training:     Assist Level Assistive Device Comments    Bed Mobility Sup  Semi-supine to sitting at EOB using bed railing for assistance   Sit to stand/Stand to sit Sup  Sit to stand/stand to sit. Pt stood from EOB and from WC level.  Improved safety noted.     Transfers      Gait Sup  Pt able to ambulate 150 ft and 225 ft with a seated rest break between trials. Pt demonstrated improved step length and foot clearance initially, though each do remain limited.  No overt LOB noted.  Confidence with gait continues to improve, though pt does remain guarded.    Balance Training      Wheelchair Mobility      Stair Climbing      Car Transfer      Other:             Assessment:     Patient tolerated session well and was able to increase her gait distance without significant c/o fatigue.  Will plan to practice car t/f training when pt's spouse is available.  D/C planned for Wednesday.      GOALS:   Multidisciplinary Problems       Physical Therapy Goals          Problem: Physical Therapy    Goal Priority Disciplines Outcome Goal Variances Interventions   Physical Therapy Goal     PT, PT/OT Ongoing,  Progressing     Description: Patient will increase functional independence with mobility by performin. Supine to sit with Stand-by Assistance  2. Sit to supine with Stand-by Assistance  3. Sit to stand transfer with Supervision  4. Gait  x 250 feet with Supervision using No Assistive Device.   5. Ascend/descend 5 stair with right Handrails Minimal Assistance using No Assistive Device.                            Discharge Recommendations:      Home with  and 24 hour care    Discharge Equipment Recommendations:     shower chair     At end of treatment, patient remained:     X Up in chair     In bed   X With alarm activated    Bed rails up   X Call bell in reach      Family/friends present     Restraints secured properly     In bathroom with CNA/RN notified     In gym with PT/PTA/tech     Nurse aware     Other:           PT Start Time: 0832   PT Stop Time: 0855  PT Total Time (min): 23 min     Billable Minutes: Gait Training 2024

## 2024-04-12 NOTE — PT/OT/SLP PROGRESS
Speech Language Pathology Treatment    Patient Name:  Lazara Casillas   MRN:  [id number]  Admitting Diagnosis: S/P rotator cuff repair    Recommendations:                 General Recommendations:  Cognitive-linguistic therapy  Diet recommendations:  Regular Diet - IDDSI Level 7, Liquid Diet Level: Thin liquids - IDDSI Level 0   Aspiration Precautions:  n/a    General Precautions: Standard, fall  Communication strategies:   repeat as needed    Assessment:     Lazara Casillas is a 73 y.o. female with an SLP diagnosis of Cognitive-Linguistic Impairment.  She presents with reduced executive function, working memory, and attention. Poor task switching.    Subjective     Pt participated well and was pleasant throughout.    Patient goals: participate in IADLs at baseline level     Pain/Comfort:   No pain demonstrated or reported    Respiratory Status: Room air    Objective:     SLP initiated functional cognitive assessment with ALVINA. Cues provided after completion of subtests as therapeutic tasks.     SLP introduced strategies of grouping and repetition for money counting.    Task Score Level 1-3*   (65+ years)   Telling Time 10 1   Counting Money  6/10 2   Addressing Envelope     Daily Math Problems     Writing Checks and Balancing Checkbook     Understadning Medicine Labels     Using a Calendar     Reading Instructions     Using the Telephone     Writing a Phone Message     *Level 1: high probability of independent functioning; level 2: need for some level of assistance; further exploration; level 3: high probability that Pt is not able to function independently     Has the patient been evaluated by SLP for swallowing?   No  Keep patient NPO? No   Current Respiratory Status:    room air     Goals:   Multidisciplinary Problems       SLP Goals          Problem: SLP    Goal Priority Disciplines Outcome   SLP Goal     SLP Ongoing, Progressing   Description: LT. Pt will use cognitive strategies for improved safety and  communication.    STGs:  1. Pt will participate in further cognitive assessment and functional assessment with PT.   2. Pt will complete personally relevant executive functioning tasks with 90% acc min cues for improved independence with IADLs.                            Plan:     Patient to be seen:   (3-5x/week)   Plan of Care expires:  04/24/24  Plan of Care reviewed with:  patient, spouse   SLP Follow-Up:  Yes       Discharge recommendations:  Moderate Intensity Therapy   Barriers to Discharge:  Level of Skilled Assistance Needed   and Safety Awareness      Time Tracking:     SLP Treatment Date:   04/12/24  Speech Start Time:  0933  Speech Stop Time:  0957     Speech Total Time (min):  24 min    Billable Minutes: Cog Tx 24min / 2 units    Sonya Olsen MA, CCC-SLP  04/12/2024

## 2024-04-13 LAB
POCT GLUCOSE: 121 MG/DL (ref 70–110)
POCT GLUCOSE: 132 MG/DL (ref 70–110)
POCT GLUCOSE: 57 MG/DL (ref 70–110)
POCT GLUCOSE: 77 MG/DL (ref 70–110)

## 2024-04-13 PROCEDURE — 11000004 HC SNF PRIVATE

## 2024-04-13 PROCEDURE — 63600175 PHARM REV CODE 636 W HCPCS: Performed by: INTERNAL MEDICINE

## 2024-04-13 PROCEDURE — 97530 THERAPEUTIC ACTIVITIES: CPT

## 2024-04-13 PROCEDURE — 25000003 PHARM REV CODE 250: Performed by: INTERNAL MEDICINE

## 2024-04-13 PROCEDURE — 99900035 HC TECH TIME PER 15 MIN (STAT)

## 2024-04-13 PROCEDURE — 94640 AIRWAY INHALATION TREATMENT: CPT

## 2024-04-13 PROCEDURE — 25000003 PHARM REV CODE 250: Performed by: NURSE PRACTITIONER

## 2024-04-13 PROCEDURE — 97116 GAIT TRAINING THERAPY: CPT

## 2024-04-13 RX ORDER — CALCIUM CARBONATE 200(500)MG
500 TABLET,CHEWABLE ORAL 3 TIMES DAILY PRN
Status: DISCONTINUED | OUTPATIENT
Start: 2024-04-13 | End: 2024-04-17 | Stop reason: HOSPADM

## 2024-04-13 RX ADMIN — DULOXETINE HYDROCHLORIDE 60 MG: 30 CAPSULE, DELAYED RELEASE ORAL at 10:04

## 2024-04-13 RX ADMIN — ACETAMINOPHEN 650 MG: 325 TABLET, FILM COATED ORAL at 10:04

## 2024-04-13 RX ADMIN — HYDROCHLOROTHIAZIDE 12.5 MG: 12.5 TABLET ORAL at 09:04

## 2024-04-13 RX ADMIN — CARIPRAZINE 1.5 MG: 1.5 CAPSULE, GELATIN COATED ORAL at 09:04

## 2024-04-13 RX ADMIN — BUPROPION HYDROCHLORIDE 150 MG: 150 TABLET, EXTENDED RELEASE ORAL at 09:04

## 2024-04-13 RX ADMIN — LOSARTAN POTASSIUM 50 MG: 25 TABLET, FILM COATED ORAL at 09:04

## 2024-04-13 RX ADMIN — MONTELUKAST 10 MG: 10 TABLET, FILM COATED ORAL at 10:04

## 2024-04-13 RX ADMIN — ISOSORBIDE MONONITRATE 60 MG: 30 TABLET, EXTENDED RELEASE ORAL at 09:04

## 2024-04-13 RX ADMIN — ASPIRIN 81 MG: 81 TABLET, COATED ORAL at 10:04

## 2024-04-13 RX ADMIN — UMECLIDINIUM 62.5 MCG: 62.5 AEROSOL, POWDER ORAL at 06:04

## 2024-04-13 RX ADMIN — DIAZEPAM 10 MG: 10 TABLET ORAL at 02:04

## 2024-04-13 RX ADMIN — INSULIN DETEMIR 52 UNITS: 100 INJECTION, SOLUTION SUBCUTANEOUS at 10:04

## 2024-04-13 RX ADMIN — MUPIROCIN 1 G: 20 OINTMENT TOPICAL at 09:04

## 2024-04-13 RX ADMIN — ASPIRIN 81 MG: 81 TABLET, COATED ORAL at 09:04

## 2024-04-13 RX ADMIN — PROPRANOLOL HYDROCHLORIDE 10 MG: 10 TABLET ORAL at 02:04

## 2024-04-13 RX ADMIN — PROPRANOLOL HYDROCHLORIDE 10 MG: 10 TABLET ORAL at 09:04

## 2024-04-13 RX ADMIN — MUPIROCIN 1 G: 20 OINTMENT TOPICAL at 10:04

## 2024-04-13 RX ADMIN — PROPRANOLOL HYDROCHLORIDE 10 MG: 10 TABLET ORAL at 10:04

## 2024-04-13 RX ADMIN — FLUTICASONE FUROATE AND VILANTEROL TRIFENATATE 1 PUFF: 100; 25 POWDER RESPIRATORY (INHALATION) at 06:04

## 2024-04-13 RX ADMIN — ESCITALOPRAM OXALATE 20 MG: 10 TABLET ORAL at 09:04

## 2024-04-13 NOTE — PROGRESS NOTES
[facility]  [facility] Medicine  Progress Note    Patient Name: Lazara Casillas  MRN: [id number]  Patient Class: IP- Swing   Admission Date: 4/9/2024  Length of Stay: 4 days  Attending Physician: Gris Bingham DO  Primary Care Provider: Stacey, Primary Doctor        Subjective:     Principal Problem:S/P rotator cuff repair        HPI:  74 yo CF admitted to swing bed on 4/9/24 for PT/OT s/p elective left reverse total shoulder arthroplasty on 4/4/24 by Dr. Watson Hennessy. PMH includes HTN, DMT2, anxiety, depression, frequent falls, constipation, allergies, COPD. Pt's  is at bedside. Medications reviewed with the pt. She reports that she was falling frequently at home due to weakness in her legs. She also has chronic dizziness/imbalance for which she takes meclezine 1-2 times daily. She last fell about 2 months ago when she injured her left shoulder. She currently denies SOB, CP, abd pain. Endorses b/l leg weakness and discomfort, along with left shoulder pain and decreased ROM.     Overview/Hospital Course:  4/10/24: Pt sitting up in chair at bedside. She states she is feeling good this morning. She denies chest discomfort or SOB. She worked with PT/OT this morning.    4/11/24: Pt sitting up in the chair at bedside. She has worked with PT already this AM. She states she is feeling fine. Denies SOB or CP. Does endorse pain in her left shoulder.       4/12/24:Pt sitting up in chair at bedside. She has no complaints this morning. Plan is for DC Wednesday of next week, home with .    4/13/24: Pt resting comfortably in bed. She denies any cp, sob, abd pain. States she is having too many bms, so she declines linzess.     Past Medical History:   Diagnosis Date    Anxiety disorder, unspecified     COPD (chronic obstructive pulmonary disease)     Depression     Diabetes mellitus, type 2     Hypertension     YE (obstructive sleep apnea)     Diagnosed years ago-does not sleep with c-pap        Past Surgical History:   Procedure Laterality Date    CHOLECYSTECTOMY      COLONOSCOPY      CORONARY ARTERY BYPASS GRAFT  2017    HYSTERECTOMY      REVERSE TOTAL SHOULDER ARTHROPLASTY Left 4/3/2024    Procedure: ARTHROPLASTY, SHOULDER, TOTAL, REVERSE;  Surgeon: Watson Hennessy MD;  Location: [facility];  Service: Orthopedics;  Laterality: Left;    ROTATOR CUFF REPAIR Right 03/27/2017       Review of patient's allergies indicates:   Allergen Reactions    Sulfa (sulfonamide antibiotics)        Current Facility-Administered Medications   Medication Dose Route Frequency Provider Last Rate Last Admin    acetaminophen tablet 500 mg  500 mg Oral Q6H PRN Gris Bingham DO        acetaminophen tablet 650 mg  650 mg Oral Q6H PRN Gris Bingham DO   650 mg at 04/12/24 2140    albuterol nebulizer solution 0.63 mg  0.63 mg Nebulization Q6H PRN Gris Bingham DO        aluminum-magnesium hydroxide-simethicone 200-200-20 mg/5 mL suspension 30 mL  30 mL Oral Q6H PRN Gris Bingham DO        aspirin EC tablet 81 mg  81 mg Oral BID Jeny Marie FNP   81 mg at 04/13/24 0900    buPROPion TBSR 12 hr tablet 150 mg  150 mg Oral Daily Gris Bingham DO   150 mg at 04/13/24 0927    cariprazine Cap 1.5 mg  1.5 mg Oral Daily Gris Bingham DO   1.5 mg at 04/13/24 0927    dextrose 10% bolus 125 mL 125 mL  12.5 g Intravenous PRN Jeny Marie L, FNP        dextrose 10% bolus 250 mL 250 mL  25 g Intravenous PRN Jeny Marie, VALENTEP        diazePAM tablet 10 mg  10 mg Oral BID PRN Gris Bingham DO   10 mg at 04/11/24 2110    DULoxetine DR capsule 60 mg  60 mg Oral QHS Gris Bingham DO   60 mg at 04/12/24 2140    EScitalopram oxalate tablet 20 mg  20 mg Oral Daily Gris Bingham DO   20 mg at 04/13/24 0928    fluticasone furoate-vilanteroL 100-25 mcg/dose diskus inhaler 1 puff  1 puff Inhalation Daily Gris Bingham DO   1 puff at 04/13/24 0629    And    umeclidinium 62.5 mcg/actuation inhalation capsule 62.5  mcg  1 capsule Inhalation Daily Gris Bingham DO   62.5 mcg at 04/13/24 0627    glucagon (human recombinant) injection 1 mg  1 mg Intramuscular PRN Jeny Marie FNP        glucose chewable tablet 16 g  16 g Oral PRN Jeny Marie FNP        glucose chewable tablet 24 g  24 g Oral PRN Jeny Marie FNP        losartan tablet 50 mg  50 mg Oral Daily Gris Bingham DO   50 mg at 04/13/24 0929    And    hydroCHLOROthiazide tablet 12.5 mg  12.5 mg Oral Daily Gris Bingham DO   12.5 mg at 04/13/24 0929    insulin aspart U-100 injection 1-10 Units  1-10 Units Subcutaneous QID (AC + HS) PRN Jeny Marie FNP        insulin detemir U-100 injection 52 Units  52 Units Subcutaneous QHS Gris Bingham DO   52 Units at 04/12/24 2141    isosorbide mononitrate 24 hr tablet 60 mg  60 mg Oral Daily Gris Bingham DO   60 mg at 04/13/24 0929    linaCLOtide capsule 145 mcg  145 mcg Oral Before breakfast Gris Bingham DO   145 mcg at 04/10/24 0621    meclizine tablet 25 mg  25 mg Oral TID PRN Gris Bingham DO        melatonin tablet 6 mg  6 mg Oral Nightly PRN Gris Bingham DO        methocarbamoL tablet 500 mg  500 mg Oral Q8H PRN Jeny Marie FNP   500 mg at 04/11/24 2110    montelukast tablet 10 mg  10 mg Oral QHS Gris Bingham DO   10 mg at 04/12/24 2140    mupirocin 2 % ointment   Nasal BID Gris Bingham DO   1 g at 04/13/24 0929    ondansetron injection 4 mg  4 mg Intravenous Q6H PRN Gris Bingham DO        propranoloL tablet 10 mg  10 mg Oral TID Gris Bingham DO   10 mg at 04/13/24 0929    senna-docusate 8.6-50 mg per tablet 1 tablet  1 tablet Oral Daily PRN Gris Bingham DO        traMADoL tablet 50 mg  50 mg Oral Q4H PRN Gris Bingham DO   50 mg at 04/09/24 6380     Family History       Problem Relation (Age of Onset)    No Known Problems Mother, Father          Tobacco Use    Smoking status: Former     Current packs/day: 0.00     Types: Cigarettes     Quit  date: 2013     Years since quittin.2    Smokeless tobacco: Never   Substance and Sexual Activity    Alcohol use: Never    Drug use: Never    Sexual activity: Not on file     Review of Systems   Constitutional:  Positive for activity change and fatigue. Negative for appetite change and fever.   Respiratory:  Negative for chest tightness and shortness of breath.    Cardiovascular:  Negative for chest pain and leg swelling.   Gastrointestinal:  Negative for abdominal distention, abdominal pain, constipation, diarrhea, nausea and vomiting.   Musculoskeletal:  Positive for arthralgias and myalgias.   Skin:  Negative for rash and wound.   Neurological:  Positive for weakness. Negative for dizziness.     Objective:     Vital Signs (Most Recent):  Temp: 97.4 °F (36.3 °C) (24)  Pulse: 73 (24)  Resp: 18 (24)  BP: (!) 147/70 (24)  SpO2: 97 % (24) Vital Signs (24h Range):  Temp:  [97.4 °F (36.3 °C)-98.1 °F (36.7 °C)] 97.4 °F (36.3 °C)  Pulse:  [58-73] 73  Resp:  [18] 18  SpO2:  [97 %-100 %] 97 %  BP: (124-156)/(70-74) 147/70     Weight: 93.5 kg (206 lb 2.1 oz)  Body mass index is 34.3 kg/m².     Physical Exam  Vitals and nursing note reviewed.   Constitutional:       General: She is not in acute distress.     Appearance: Normal appearance.   HENT:      Head: Normocephalic and atraumatic.      Nose: Nose normal.   Eyes:      Conjunctiva/sclera: Conjunctivae normal.   Cardiovascular:      Rate and Rhythm: Normal rate and regular rhythm.      Pulses: Normal pulses.      Heart sounds: Normal heart sounds. No murmur heard.     No gallop.   Pulmonary:      Effort: Pulmonary effort is normal.      Breath sounds: Normal breath sounds. No wheezing, rhonchi or rales.   Abdominal:      General: Bowel sounds are normal. There is no distension.      Palpations: Abdomen is soft.      Tenderness: There is no abdominal tenderness. There is no guarding.   Musculoskeletal:          General: No deformity.      Cervical back: Normal range of motion and neck supple.      Right lower leg: Edema present.      Left lower leg: Edema present.      Comments: Decreased ROM left shoulder. Sling in place.  Pretibial edema 1+     Skin:     General: Skin is warm and dry.      Findings: No rash.   Neurological:      General: No focal deficit present.      Mental Status: She is alert and oriented to person, place, and time. Mental status is at baseline.      Gait: Gait normal.   Psychiatric:         Mood and Affect: Mood normal.         Thought Content: Thought content normal.         Judgment: Judgment normal.                Significant Labs: All pertinent labs within the past 24 hours have been reviewed.  Recent Lab Results         04/13/24  1052   04/13/24  0624   04/12/24  2020   04/12/24  1703        POCT Glucose 77   57   112   127               Significant Imaging: I have reviewed all pertinent imaging results/findings within the past 24 hours.    Assessment/Plan:      * S/P rotator cuff repair  Admit to swing bed.  PT/OT  Pain control.  ISS  Home/transfer medications reconciled.        Falls frequently  PT/OT      Hypertension  Chronic, controlled. Latest blood pressure and vitals reviewed-     Temp:  [97.4 °F (36.3 °C)-98.1 °F (36.7 °C)]   Pulse:  [58-73]   Resp:  [18]   BP: (124-156)/(70-74)   SpO2:  [97 %-100 %] .   Home meds for hypertension were reviewed and noted below.   Hypertension Medications               isosorbide mononitrate (IMDUR) 60 MG 24 hr tablet Take 60 mg by mouth once daily.    losartan-hydrochlorothiazide 50-12.5 mg (HYZAAR) 50-12.5 mg per tablet Take 1 tablet by mouth once daily.    nitroGLYCERIN (NITROSTAT) 0.4 MG SL tablet Place 0.4 mg under the tongue every 5 (five) minutes as needed for Chest pain.    propranoloL (INDERAL) 10 MG tablet Take 10 mg by mouth 3 (three) times daily.            While in the hospital, will manage blood pressure as follows; Continue home  antihypertensive regimen    Will utilize p.r.n. blood pressure medication only if patient's blood pressure greater than 160/100 and she develops symptoms such as worsening chest pain or shortness of breath.    Diabetes mellitus, type 2  Patient's FSGs are controlled on current medication regimen.  Last A1c reviewed-   Lab Results   Component Value Date    HGBA1C 5.2 03/12/2024     Most recent fingerstick glucose reviewed-   Recent Labs   Lab 04/12/24  1703 04/12/24  2020 04/13/24  0624 04/13/24  1052   POCTGLUCOSE 127* 112* 57* 77       Current correctional scale  moderate  Maintain anti-hyperglycemic dose as follows-   Antihyperglycemics (From admission, onward)      Start     Stop Route Frequency Ordered    04/09/24 2100  insulin detemir U-100 injection 52 Units         -- SubQ Nightly 04/09/24 1256    04/09/24 1107  insulin aspart U-100 injection 1-10 Units         -- SubQ Before meals & nightly PRN 04/09/24 1107          Hold Oral hypoglycemics while patient is in the hospital.    Depression  Home meds reconciled.        COPD (chronic obstructive pulmonary disease)  Home meds reconciled.    Anxiety disorder, unspecified  Home meds reconciled.      Traumatic incomplete tear of left rotator cuff  S/p repair    History of coronary artery disease  Home medications reconciled.      Primary osteoarthritis of left shoulder          VTE Risk Mitigation (From admission, onward)           Ordered     Place JOE hose  Until discontinued        Comments: Remove at least daily for skin assessment    04/09/24 1107                    Discharge Planning   DOMINGUEZ: 4/17/2024     Code Status: Not on file   Is the patient medically ready for discharge?:     Reason for patient still in hospital (select all that apply): Patient trending condition and PT / OT recommendations  Discharge Plan A: Home   Discharge Delays: None known at this time              SKYE COLEMAN,   Department of Hospital Medicine   [facility] -  Medical Surgical Unit

## 2024-04-13 NOTE — SUBJECTIVE & OBJECTIVE
Past Medical History:   Diagnosis Date    Anxiety disorder, unspecified     COPD (chronic obstructive pulmonary disease)     Depression     Diabetes mellitus, type 2     Hypertension     YE (obstructive sleep apnea)     Diagnosed years ago-does not sleep with c-pap       Past Surgical History:   Procedure Laterality Date    CHOLECYSTECTOMY      COLONOSCOPY      CORONARY ARTERY BYPASS GRAFT  2017    HYSTERECTOMY      REVERSE TOTAL SHOULDER ARTHROPLASTY Left 4/3/2024    Procedure: ARTHROPLASTY, SHOULDER, TOTAL, REVERSE;  Surgeon: Watson Hennessy MD;  Location: [facility];  Service: Orthopedics;  Laterality: Left;    ROTATOR CUFF REPAIR Right 03/27/2017       Review of patient's allergies indicates:   Allergen Reactions    Sulfa (sulfonamide antibiotics)        Current Facility-Administered Medications   Medication Dose Route Frequency Provider Last Rate Last Admin    acetaminophen tablet 500 mg  500 mg Oral Q6H PRN Gris Bingham DO        acetaminophen tablet 650 mg  650 mg Oral Q6H PRN Gris Bingham DO   650 mg at 04/12/24 2140    albuterol nebulizer solution 0.63 mg  0.63 mg Nebulization Q6H PRN Gris Bingham DO        aluminum-magnesium hydroxide-simethicone 200-200-20 mg/5 mL suspension 30 mL  30 mL Oral Q6H PRN Gris Bingham DO        aspirin EC tablet 81 mg  81 mg Oral BID Jeny Marie, FNP   81 mg at 04/13/24 0900    buPROPion TBSR 12 hr tablet 150 mg  150 mg Oral Daily Gris Bingham DO   150 mg at 04/13/24 0927    cariprazine Cap 1.5 mg  1.5 mg Oral Daily Gris Bingham DO   1.5 mg at 04/13/24 0927    dextrose 10% bolus 125 mL 125 mL  12.5 g Intravenous PRN Kenneth Marieci L, FNP        dextrose 10% bolus 250 mL 250 mL  25 g Intravenous PRN Jeny Marie, FNP        diazePAM tablet 10 mg  10 mg Oral BID PRN Gris Bingham DO   10 mg at 04/11/24 2110    DULoxetine DR capsule 60 mg  60 mg Oral QHS Gris Bingham DO   60 mg at 04/12/24 2140    EScitalopram oxalate tablet 20 mg  20 mg  Oral Daily Gris Bingham DO   20 mg at 04/13/24 0928    fluticasone furoate-vilanteroL 100-25 mcg/dose diskus inhaler 1 puff  1 puff Inhalation Daily Gris Bingham DO   1 puff at 04/13/24 0629    And    umeclidinium 62.5 mcg/actuation inhalation capsule 62.5 mcg  1 capsule Inhalation Daily Gris Bingham DO   62.5 mcg at 04/13/24 0627    glucagon (human recombinant) injection 1 mg  1 mg Intramuscular PRN Jeny Marie, FNP        glucose chewable tablet 16 g  16 g Oral PRN Jeny Marie, FNP        glucose chewable tablet 24 g  24 g Oral PRN Jeny Marie, FNP        losartan tablet 50 mg  50 mg Oral Daily Gris Bingham DO   50 mg at 04/13/24 0929    And    hydroCHLOROthiazide tablet 12.5 mg  12.5 mg Oral Daily Gris Bingham DO   12.5 mg at 04/13/24 0929    insulin aspart U-100 injection 1-10 Units  1-10 Units Subcutaneous QID (AC + HS) PRN Jeny Marie L, FNP        insulin detemir U-100 injection 52 Units  52 Units Subcutaneous QHS Gris Bingham DO   52 Units at 04/12/24 2141    isosorbide mononitrate 24 hr tablet 60 mg  60 mg Oral Daily Gris Bingham DO   60 mg at 04/13/24 0929    linaCLOtide capsule 145 mcg  145 mcg Oral Before breakfast Gris Bingham DO   145 mcg at 04/10/24 0621    meclizine tablet 25 mg  25 mg Oral TID PRN Gris Bingham DO        melatonin tablet 6 mg  6 mg Oral Nightly PRN Gris Bingham DO        methocarbamoL tablet 500 mg  500 mg Oral Q8H PRN Jeny Marie, FNP   500 mg at 04/11/24 2110    montelukast tablet 10 mg  10 mg Oral QHS Gris Bingham DO   10 mg at 04/12/24 2140    mupirocin 2 % ointment   Nasal BID Gris Bingham DO   1 g at 04/13/24 0929    ondansetron injection 4 mg  4 mg Intravenous Q6H PRN Gris Bingham, DO        propranoloL tablet 10 mg  10 mg Oral TID Gris Bingham DO   10 mg at 04/13/24 0929    senna-docusate 8.6-50 mg per tablet 1 tablet  1 tablet Oral Daily PRN Gris Bingham DO        traMADoL tablet 50  mg  50 mg Oral Q4H PRN Gris Bingham R, DO   50 mg at 24 1349     Family History       Problem Relation (Age of Onset)    No Known Problems Mother, Father          Tobacco Use    Smoking status: Former     Current packs/day: 0.00     Types: Cigarettes     Quit date:      Years since quittin.2    Smokeless tobacco: Never   Substance and Sexual Activity    Alcohol use: Never    Drug use: Never    Sexual activity: Not on file     Review of Systems   Constitutional:  Positive for activity change and fatigue. Negative for appetite change and fever.   Respiratory:  Negative for chest tightness and shortness of breath.    Cardiovascular:  Negative for chest pain and leg swelling.   Gastrointestinal:  Negative for abdominal distention, abdominal pain, constipation, diarrhea, nausea and vomiting.   Musculoskeletal:  Positive for arthralgias and myalgias.   Skin:  Negative for rash and wound.   Neurological:  Positive for weakness. Negative for dizziness.     Objective:     Vital Signs (Most Recent):  Temp: 97.4 °F (36.3 °C) (24 075)  Pulse: 73 (24)  Resp: 18 (24)  BP: (!) 147/70 (24)  SpO2: 97 % (24) Vital Signs (24h Range):  Temp:  [97.4 °F (36.3 °C)-98.1 °F (36.7 °C)] 97.4 °F (36.3 °C)  Pulse:  [58-73] 73  Resp:  [18] 18  SpO2:  [97 %-100 %] 97 %  BP: (124-156)/(70-74) 147/70     Weight: 93.5 kg (206 lb 2.1 oz)  Body mass index is 34.3 kg/m².     Physical Exam  Vitals and nursing note reviewed.   Constitutional:       General: She is not in acute distress.     Appearance: Normal appearance.   HENT:      Head: Normocephalic and atraumatic.      Nose: Nose normal.   Eyes:      Conjunctiva/sclera: Conjunctivae normal.   Cardiovascular:      Rate and Rhythm: Normal rate and regular rhythm.      Pulses: Normal pulses.      Heart sounds: Normal heart sounds. No murmur heard.     No gallop.   Pulmonary:      Effort: Pulmonary effort is normal.      Breath sounds:  Normal breath sounds. No wheezing, rhonchi or rales.   Abdominal:      General: Bowel sounds are normal. There is no distension.      Palpations: Abdomen is soft.      Tenderness: There is no abdominal tenderness. There is no guarding.   Musculoskeletal:         General: No deformity.      Cervical back: Normal range of motion and neck supple.      Right lower leg: Edema present.      Left lower leg: Edema present.      Comments: Decreased ROM left shoulder. Sling in place.  Pretibial edema 1+     Skin:     General: Skin is warm and dry.      Findings: No rash.   Neurological:      General: No focal deficit present.      Mental Status: She is alert and oriented to person, place, and time. Mental status is at baseline.      Gait: Gait normal.   Psychiatric:         Mood and Affect: Mood normal.         Thought Content: Thought content normal.         Judgment: Judgment normal.                Significant Labs: All pertinent labs within the past 24 hours have been reviewed.  Recent Lab Results         04/13/24  1052   04/13/24  0624   04/12/24  2020   04/12/24  1703        POCT Glucose 77   57   112   127               Significant Imaging: I have reviewed all pertinent imaging results/findings within the past 24 hours.

## 2024-04-13 NOTE — ASSESSMENT & PLAN NOTE
Chronic, controlled. Latest blood pressure and vitals reviewed-     Temp:  [97.4 °F (36.3 °C)-98.1 °F (36.7 °C)]   Pulse:  [58-73]   Resp:  [18]   BP: (124-156)/(70-74)   SpO2:  [97 %-100 %] .   Home meds for hypertension were reviewed and noted below.   Hypertension Medications               isosorbide mononitrate (IMDUR) 60 MG 24 hr tablet Take 60 mg by mouth once daily.    losartan-hydrochlorothiazide 50-12.5 mg (HYZAAR) 50-12.5 mg per tablet Take 1 tablet by mouth once daily.    nitroGLYCERIN (NITROSTAT) 0.4 MG SL tablet Place 0.4 mg under the tongue every 5 (five) minutes as needed for Chest pain.    propranoloL (INDERAL) 10 MG tablet Take 10 mg by mouth 3 (three) times daily.            While in the hospital, will manage blood pressure as follows; Continue home antihypertensive regimen    Will utilize p.r.n. blood pressure medication only if patient's blood pressure greater than 160/100 and she develops symptoms such as worsening chest pain or shortness of breath.

## 2024-04-13 NOTE — ASSESSMENT & PLAN NOTE
Patient's FSGs are controlled on current medication regimen.  Last A1c reviewed-   Lab Results   Component Value Date    HGBA1C 5.2 03/12/2024     Most recent fingerstick glucose reviewed-   Recent Labs   Lab 04/12/24  1703 04/12/24 2020 04/13/24  0624 04/13/24  1052   POCTGLUCOSE 127* 112* 57* 77       Current correctional scale  moderate  Maintain anti-hyperglycemic dose as follows-   Antihyperglycemics (From admission, onward)    Start     Stop Route Frequency Ordered    04/09/24 2100  insulin detemir U-100 injection 52 Units         -- SubQ Nightly 04/09/24 1256    04/09/24 1107  insulin aspart U-100 injection 1-10 Units         -- SubQ Before meals & nightly PRN 04/09/24 1107        Hold Oral hypoglycemics while patient is in the hospital.

## 2024-04-13 NOTE — PT/OT/SLP PROGRESS
Physical Therapy Treatment Note           Name: Lazara Casillas    : 1950 (73 y.o.)  MRN: [id number]             Subjective Assessment:     No complaints  Lethargic   x Awake, alert, cooperative  Uncooperative    Agitated x c/o pain    Appropriate  c/o fatigue    Confused  Treated at bedside     Emotionally labile  Treated in gym/dept.    Impulsive  Other:    Flat affect       Pain/Comfort:    Pain Rating 1: 2/10  Location - Side 1: Left  Location 1: shoulder  Pain Addressed 1: Reposition    Therapy Precautions:      Cognitive deficits  Spinal precautions    Collar - hard  Sternal precautions    Collar - soft   TLSO   x Fall risk  LSO    Hip precautions - posterior  Knee immobilizer    Hip precautions - anterior  WBAT    Impaired communication  Partial weightbearing    Oxygen  TTWB    PEG tube x NWB left UE    Visual deficits  Other:    Hearing deficits        Vital Signs:     Vitals Value    Room air      Oxygen (L)     Blood pressure     Heart rate          Mobility Training:     Assist level Comments    Bed mobility     Sit to stand/stand to sit SBA  Sit to stand, stand to sit, and bed to chair transfers completed with SBA and verbal cues for set up and safety.    Transfers SBA  Bed to chair    Gait CGA  Gait training HHA for 2 x 150 feet with verbal cues for increased hip/knee flexion during swing phase.    Balance training     Wheelchair mobility     Car transfer     Other:          Therapeutic Exercise:     Exercise Sets Reps Comments                               Additional Treatment:    Pt requires continued therapy services to improve her strength and mobility.     Assessment:     Patient tolerated session well .    PT Plan:     Continue with current POC.    GOALS:   Multidisciplinary Problems       Physical Therapy Goals          Problem: Physical Therapy    Goal Priority Disciplines Outcome Goal Variances Interventions   Physical Therapy Goal     PT, PT/OT Ongoing, Progressing      Description: Patient will increase functional independence with mobility by performin. Supine to sit with Stand-by Assistance  2. Sit to supine with Stand-by Assistance  3. Sit to stand transfer with Supervision  4. Gait  x 250 feet with Supervision using No Assistive Device.   5. Ascend/descend 5 stair with right Handrails Minimal Assistance using No Assistive Device.                            Discharge Recommendations:       Home with HH      Discharge Equipment Recommendations:     shower chair     At end of treatment, patient remained:      Up in chair    x In bed   x With alarm activated   x Bed rails up   x Call bell in reach      Family/friends present     Restraints secured properly     In bathroom with CNA/RN notified     In gym with PT/PTA/tech     Nurse aware     Other:           PT Start Time: 835     PT Stop Time: 905  PT Total Time (min): 30 min     Billable Minutes: Gait Training 20 and Therapeutic Activity 10      2024  x

## 2024-04-14 LAB
POCT GLUCOSE: 116 MG/DL (ref 70–110)
POCT GLUCOSE: 169 MG/DL (ref 70–110)
POCT GLUCOSE: 82 MG/DL (ref 70–110)
POCT GLUCOSE: 88 MG/DL (ref 70–110)

## 2024-04-14 PROCEDURE — 63600175 PHARM REV CODE 636 W HCPCS: Performed by: INTERNAL MEDICINE

## 2024-04-14 PROCEDURE — 99900035 HC TECH TIME PER 15 MIN (STAT)

## 2024-04-14 PROCEDURE — 25000003 PHARM REV CODE 250: Performed by: INTERNAL MEDICINE

## 2024-04-14 PROCEDURE — 11000004 HC SNF PRIVATE

## 2024-04-14 PROCEDURE — 94640 AIRWAY INHALATION TREATMENT: CPT

## 2024-04-14 PROCEDURE — 25000003 PHARM REV CODE 250: Performed by: NURSE PRACTITIONER

## 2024-04-14 RX ADMIN — BUPROPION HYDROCHLORIDE 150 MG: 150 TABLET, EXTENDED RELEASE ORAL at 09:04

## 2024-04-14 RX ADMIN — DULOXETINE HYDROCHLORIDE 60 MG: 30 CAPSULE, DELAYED RELEASE ORAL at 10:04

## 2024-04-14 RX ADMIN — TRAMADOL HYDROCHLORIDE 50 MG: 50 TABLET, COATED ORAL at 10:04

## 2024-04-14 RX ADMIN — ACETAMINOPHEN 650 MG: 325 TABLET, FILM COATED ORAL at 08:04

## 2024-04-14 RX ADMIN — PROPRANOLOL HYDROCHLORIDE 10 MG: 10 TABLET ORAL at 09:04

## 2024-04-14 RX ADMIN — LOSARTAN POTASSIUM 50 MG: 25 TABLET, FILM COATED ORAL at 09:04

## 2024-04-14 RX ADMIN — ASPIRIN 81 MG: 81 TABLET, COATED ORAL at 10:04

## 2024-04-14 RX ADMIN — PROPRANOLOL HYDROCHLORIDE 10 MG: 10 TABLET ORAL at 10:04

## 2024-04-14 RX ADMIN — FLUTICASONE FUROATE AND VILANTEROL TRIFENATATE 1 PUFF: 100; 25 POWDER RESPIRATORY (INHALATION) at 06:04

## 2024-04-14 RX ADMIN — ESCITALOPRAM OXALATE 20 MG: 10 TABLET ORAL at 09:04

## 2024-04-14 RX ADMIN — PROPRANOLOL HYDROCHLORIDE 10 MG: 10 TABLET ORAL at 04:04

## 2024-04-14 RX ADMIN — MECLIZINE 25 MG: 12.5 TABLET ORAL at 09:04

## 2024-04-14 RX ADMIN — HYDROCHLOROTHIAZIDE 12.5 MG: 12.5 TABLET ORAL at 09:04

## 2024-04-14 RX ADMIN — TRAMADOL HYDROCHLORIDE 50 MG: 50 TABLET, COATED ORAL at 01:04

## 2024-04-14 RX ADMIN — ISOSORBIDE MONONITRATE 60 MG: 30 TABLET, EXTENDED RELEASE ORAL at 09:04

## 2024-04-14 RX ADMIN — METHOCARBAMOL TABLETS 500 MG: 500 TABLET, COATED ORAL at 06:04

## 2024-04-14 RX ADMIN — ASPIRIN 81 MG: 81 TABLET, COATED ORAL at 09:04

## 2024-04-14 RX ADMIN — MONTELUKAST 10 MG: 10 TABLET, FILM COATED ORAL at 10:04

## 2024-04-14 RX ADMIN — UMECLIDINIUM 62.5 MCG: 62.5 AEROSOL, POWDER ORAL at 06:04

## 2024-04-14 RX ADMIN — CARIPRAZINE 1.5 MG: 1.5 CAPSULE, GELATIN COATED ORAL at 09:04

## 2024-04-14 RX ADMIN — INSULIN DETEMIR 52 UNITS: 100 INJECTION, SOLUTION SUBCUTANEOUS at 10:04

## 2024-04-14 NOTE — PROGRESS NOTES
[facility]  [facility]  Progress Note    Patient Name: Lazara Casillas  MRN: [id number]  Patient Class: IP- Swing   Admission Date: 4/9/2024  Length of Stay: 5 days  Attending Physician: Gris Bingham DO  Primary Care Provider: Stacey, Primary Doctor        Subjective:     Principal Problem:S/P rotator cuff repair        HPI:  74 yo CF admitted to swing bed on 4/9/24 for PT/OT s/p elective left reverse total shoulder arthroplasty on 4/4/24 by Dr. Watson Hennessy. PMH includes HTN, DMT2, anxiety, depression, frequent falls, constipation, allergies, COPD. Pt's  is at bedside. Medications reviewed with the pt. She reports that she was falling frequently at home due to weakness in her legs. She also has chronic dizziness/imbalance for which she takes meclezine 1-2 times daily. She last fell about 2 months ago when she injured her left shoulder. She currently denies SOB, CP, abd pain. Endorses b/l leg weakness and discomfort, along with left shoulder pain and decreased ROM.     Overview/Hospital Course:  4/10/24: Pt sitting up in chair at bedside. She states she is feeling good this morning. She denies chest discomfort or SOB. She worked with PT/OT this morning.    4/11/24: Pt sitting up in the chair at bedside. She has worked with PT already this AM. She states she is feeling fine. Denies SOB or CP. Does endorse pain in her left shoulder.       4/12/24:Pt sitting up in chair at bedside. She has no complaints this morning. Plan is for DC Wednesday of next week, home with .    4/13/24: Pt resting comfortably in bed. She denies any cp, sob, abd pain. States she is having too many bms, so she declines linzess.     4/14/24: Pt on the commode during first attempt to examine. Revisited. Lying in bed comfortably. No new complaints this AM.     Past Medical History:   Diagnosis Date    Anxiety disorder, unspecified     COPD (chronic obstructive pulmonary disease)     Depression      Diabetes mellitus, type 2     Hypertension     YE (obstructive sleep apnea)     Diagnosed years ago-does not sleep with c-pap       Past Surgical History:   Procedure Laterality Date    CHOLECYSTECTOMY      COLONOSCOPY      CORONARY ARTERY BYPASS GRAFT  2017    HYSTERECTOMY      REVERSE TOTAL SHOULDER ARTHROPLASTY Left 4/3/2024    Procedure: ARTHROPLASTY, SHOULDER, TOTAL, REVERSE;  Surgeon: Watson Hennessy MD;  Location: [facility];  Service: Orthopedics;  Laterality: Left;    ROTATOR CUFF REPAIR Right 03/27/2017       Review of patient's allergies indicates:   Allergen Reactions    Sulfa (sulfonamide antibiotics)        Current Facility-Administered Medications   Medication Dose Route Frequency Provider Last Rate Last Admin    acetaminophen tablet 500 mg  500 mg Oral Q6H PRN Gris Bingham DO        acetaminophen tablet 650 mg  650 mg Oral Q6H PRN Gris Bingham DO   650 mg at 04/13/24 2238    albuterol nebulizer solution 0.63 mg  0.63 mg Nebulization Q6H PRN Gris Bingham DO        aluminum-magnesium hydroxide-simethicone 200-200-20 mg/5 mL suspension 30 mL  30 mL Oral Q6H PRN Gris Bingham DO        aspirin EC tablet 81 mg  81 mg Oral BID Jeny Marie, FNP   81 mg at 04/14/24 0933    buPROPion TBSR 12 hr tablet 150 mg  150 mg Oral Daily Gris Bingham DO   150 mg at 04/14/24 0933    calcium carbonate 200 mg calcium (500 mg) chewable tablet 500 mg  500 mg Oral TID PRN Gris Bingham DO        cariprazine Cap 1.5 mg  1.5 mg Oral Daily Gris Bingham DO   1.5 mg at 04/13/24 0927    dextrose 10% bolus 125 mL 125 mL  12.5 g Intravenous PRN Jeny Marie L, FNP        dextrose 10% bolus 250 mL 250 mL  25 g Intravenous PRN Jeny Marie, FNP        diazePAM tablet 10 mg  10 mg Oral BID PRN Gris Bingham DO   10 mg at 04/13/24 1431    DULoxetine DR capsule 60 mg  60 mg Oral QHS Gris Bingham DO   60 mg at 04/13/24 2229    EScitalopram oxalate tablet 20 mg  20 mg Oral Daily Gris Bingham  NICK DO   20 mg at 04/14/24 0933    fluticasone furoate-vilanteroL 100-25 mcg/dose diskus inhaler 1 puff  1 puff Inhalation Daily Gris Bingham DO   1 puff at 04/14/24 0608    And    umeclidinium 62.5 mcg/actuation inhalation capsule 62.5 mcg  1 capsule Inhalation Daily Gris Bingham DO   62.5 mcg at 04/14/24 0612    glucagon (human recombinant) injection 1 mg  1 mg Intramuscular PRN Jeny Marie FNP        glucose chewable tablet 16 g  16 g Oral PRN Jeny Marie, FNP        glucose chewable tablet 24 g  24 g Oral PRN Jeny Marie, FNP        losartan tablet 50 mg  50 mg Oral Daily Gris Bingham DO   50 mg at 04/14/24 0933    And    hydroCHLOROthiazide tablet 12.5 mg  12.5 mg Oral Daily Gris Bingham DO   12.5 mg at 04/14/24 0933    insulin aspart U-100 injection 1-10 Units  1-10 Units Subcutaneous QID (AC + HS) PRN Jeny Marie, FNP        insulin detemir U-100 injection 52 Units  52 Units Subcutaneous QHS Gris Bingham DO   52 Units at 04/13/24 2230    isosorbide mononitrate 24 hr tablet 60 mg  60 mg Oral Daily Gris Bingham DO   60 mg at 04/14/24 0933    linaCLOtide capsule 145 mcg  145 mcg Oral Before breakfast Gris Bingham DO   145 mcg at 04/10/24 0621    meclizine tablet 25 mg  25 mg Oral TID PRN Gris Bingham DO        melatonin tablet 6 mg  6 mg Oral Nightly PRN Gris Bingham DO        methocarbamoL tablet 500 mg  500 mg Oral Q8H PRN Jeny Marie FNP   500 mg at 04/11/24 2110    montelukast tablet 10 mg  10 mg Oral QHS Gris Bingham DO   10 mg at 04/13/24 2228    ondansetron injection 4 mg  4 mg Intravenous Q6H PRN Gris Bingham DO        propranoloL tablet 10 mg  10 mg Oral TID Gris Bingham DO   10 mg at 04/14/24 0933    senna-docusate 8.6-50 mg per tablet 1 tablet  1 tablet Oral Daily PRN Gris Bingham DO        traMADoL tablet 50 mg  50 mg Oral Q4H PRN Gris Bingham DO   50 mg at 04/14/24 0142     Family History       Problem  Relation (Age of Onset)    No Known Problems Mother, Father          Tobacco Use    Smoking status: Former     Current packs/day: 0.00     Types: Cigarettes     Quit date:      Years since quittin.2    Smokeless tobacco: Never   Substance and Sexual Activity    Alcohol use: Never    Drug use: Never    Sexual activity: Not on file     Review of Systems   Constitutional:  Positive for activity change and fatigue. Negative for appetite change and fever.   Respiratory:  Negative for chest tightness and shortness of breath.    Cardiovascular:  Negative for chest pain and leg swelling.   Gastrointestinal:  Negative for abdominal distention, abdominal pain, constipation, diarrhea, nausea and vomiting.   Musculoskeletal:  Positive for arthralgias and myalgias.   Skin:  Negative for rash and wound.   Neurological:  Positive for weakness. Negative for dizziness.     Objective:     Vital Signs (Most Recent):  Temp: 97.7 °F (36.5 °C) (24)  Pulse: 73 (24)  Resp: 18 (24)  BP: 116/75 (standing) (24)  SpO2: 97 % (24) Vital Signs (24h Range):  Temp:  [97.3 °F (36.3 °C)-98 °F (36.7 °C)] 97.7 °F (36.5 °C)  Pulse:  [61-73] 73  Resp:  [18] 18  SpO2:  [97 %-99 %] 97 %  BP: (116-145)/(72-83) 116/75     Weight: 88.9 kg (195 lb 15.8 oz)  Body mass index is 32.61 kg/m².     Physical Exam  Vitals and nursing note reviewed.   Constitutional:       General: She is not in acute distress.     Appearance: Normal appearance.   HENT:      Head: Normocephalic and atraumatic.      Nose: Nose normal.   Eyes:      Conjunctiva/sclera: Conjunctivae normal.   Cardiovascular:      Rate and Rhythm: Normal rate and regular rhythm.      Pulses: Normal pulses.      Heart sounds: Normal heart sounds. No murmur heard.     No gallop.   Pulmonary:      Effort: Pulmonary effort is normal.      Breath sounds: Normal breath sounds. No wheezing, rhonchi or rales.   Abdominal:      General: Bowel sounds are  normal. There is no distension.      Palpations: Abdomen is soft.      Tenderness: There is no abdominal tenderness. There is no guarding.   Musculoskeletal:         General: No deformity.      Cervical back: Normal range of motion and neck supple.      Right lower leg: No edema.      Left lower leg: No edema.      Comments: Decreased ROM left shoulder. Sling in place.       Skin:     General: Skin is warm and dry.      Findings: No rash.   Neurological:      General: No focal deficit present.      Mental Status: She is alert and oriented to person, place, and time. Mental status is at baseline.      Gait: Gait normal.   Psychiatric:         Mood and Affect: Mood normal.         Thought Content: Thought content normal.         Judgment: Judgment normal.                Significant Labs: All pertinent labs within the past 24 hours have been reviewed.  Recent Lab Results         04/13/24  2033   04/13/24  1700   04/13/24  1052        POCT Glucose 132   121   77               Significant Imaging: I have reviewed all pertinent imaging results/findings within the past 24 hours.    Assessment/Plan:      * S/P rotator cuff repair  Admit to swing bed.  PT/OT  Pain control.  ISS  Home/transfer medications reconciled.        Falls frequently  PT/OT      Hypertension  Chronic, controlled. Latest blood pressure and vitals reviewed-     Temp:  [97.3 °F (36.3 °C)-98 °F (36.7 °C)]   Pulse:  [61-73]   Resp:  [18]   BP: (116-145)/(72-83)   SpO2:  [97 %-99 %] .   Home meds for hypertension were reviewed and noted below.   Hypertension Medications               isosorbide mononitrate (IMDUR) 60 MG 24 hr tablet Take 60 mg by mouth once daily.    losartan-hydrochlorothiazide 50-12.5 mg (HYZAAR) 50-12.5 mg per tablet Take 1 tablet by mouth once daily.    nitroGLYCERIN (NITROSTAT) 0.4 MG SL tablet Place 0.4 mg under the tongue every 5 (five) minutes as needed for Chest pain.    propranoloL (INDERAL) 10 MG tablet Take 10 mg by mouth 3  (three) times daily.            While in the hospital, will manage blood pressure as follows; Continue home antihypertensive regimen    Will utilize p.r.n. blood pressure medication only if patient's blood pressure greater than 160/100 and she develops symptoms such as worsening chest pain or shortness of breath.    Diabetes mellitus, type 2  Patient's FSGs are controlled on current medication regimen.  Last A1c reviewed-   Lab Results   Component Value Date    HGBA1C 5.2 03/12/2024     Most recent fingerstick glucose reviewed-   Recent Labs   Lab 04/13/24  1052 04/13/24  1700 04/13/24 2033   POCTGLUCOSE 77 121* 132*       Current correctional scale  moderate  Maintain anti-hyperglycemic dose as follows-   Antihyperglycemics (From admission, onward)      Start     Stop Route Frequency Ordered    04/09/24 2100  insulin detemir U-100 injection 52 Units         -- SubQ Nightly 04/09/24 1256    04/09/24 1107  insulin aspart U-100 injection 1-10 Units         -- SubQ Before meals & nightly PRN 04/09/24 1107          Hold Oral hypoglycemics while patient is in the hospital.    Depression  Home meds reconciled.        COPD (chronic obstructive pulmonary disease)  Home meds reconciled.    Anxiety disorder, unspecified  Home meds reconciled.      Traumatic incomplete tear of left rotator cuff  S/p repair    History of coronary artery disease  Home medications reconciled.      Primary osteoarthritis of left shoulder          VTE Risk Mitigation (From admission, onward)           Ordered     Place JOE hose  Until discontinued        Comments: Remove at least daily for skin assessment    04/09/24 1107                    Discharge Planning   DOMINGUEZ: 4/17/2024     Code Status: Not on file   Is the patient medically ready for discharge?:     Reason for patient still in hospital (select all that apply): Patient trending condition and PT / OT recommendations  Discharge Plan A: Home   Discharge Delays: None known at this  time              SKYE COLEMAN DO  Department of Hospital Medicine   [facility]

## 2024-04-14 NOTE — SUBJECTIVE & OBJECTIVE
Past Medical History:   Diagnosis Date    Anxiety disorder, unspecified     COPD (chronic obstructive pulmonary disease)     Depression     Diabetes mellitus, type 2     Hypertension     YE (obstructive sleep apnea)     Diagnosed years ago-does not sleep with c-pap       Past Surgical History:   Procedure Laterality Date    CHOLECYSTECTOMY      COLONOSCOPY      CORONARY ARTERY BYPASS GRAFT  2017    HYSTERECTOMY      REVERSE TOTAL SHOULDER ARTHROPLASTY Left 4/3/2024    Procedure: ARTHROPLASTY, SHOULDER, TOTAL, REVERSE;  Surgeon: Watson Hennessy MD;  Location: [facility];  Service: Orthopedics;  Laterality: Left;    ROTATOR CUFF REPAIR Right 03/27/2017       Review of patient's allergies indicates:   Allergen Reactions    Sulfa (sulfonamide antibiotics)        Current Facility-Administered Medications   Medication Dose Route Frequency Provider Last Rate Last Admin    acetaminophen tablet 500 mg  500 mg Oral Q6H PRN Gris Bingham DO        acetaminophen tablet 650 mg  650 mg Oral Q6H PRN Gris Bingham DO   650 mg at 04/13/24 2238    albuterol nebulizer solution 0.63 mg  0.63 mg Nebulization Q6H PRN Gris Bingham DO        aluminum-magnesium hydroxide-simethicone 200-200-20 mg/5 mL suspension 30 mL  30 mL Oral Q6H PRN Gris Bingham DO        aspirin EC tablet 81 mg  81 mg Oral BID Jeny Marie, FNP   81 mg at 04/14/24 0933    buPROPion TBSR 12 hr tablet 150 mg  150 mg Oral Daily Gris Bingham DO   150 mg at 04/14/24 0933    calcium carbonate 200 mg calcium (500 mg) chewable tablet 500 mg  500 mg Oral TID PRN Gris Bingham DO        cariprazine Cap 1.5 mg  1.5 mg Oral Daily Gris Bingham DO   1.5 mg at 04/13/24 0927    dextrose 10% bolus 125 mL 125 mL  12.5 g Intravenous PRN Kenneth Marieci L, FNP        dextrose 10% bolus 250 mL 250 mL  25 g Intravenous PRN Jeny Marie L, FNP        diazePAM tablet 10 mg  10 mg Oral BID PRN Gris Bingham DO   10 mg at 04/13/24 1431    DULoxetine DR capsule  60 mg  60 mg Oral QHS Gris Bingham DO   60 mg at 04/13/24 2229    EScitalopram oxalate tablet 20 mg  20 mg Oral Daily Gris Bingham DO   20 mg at 04/14/24 0933    fluticasone furoate-vilanteroL 100-25 mcg/dose diskus inhaler 1 puff  1 puff Inhalation Daily Gris Bingham DO   1 puff at 04/14/24 0608    And    umeclidinium 62.5 mcg/actuation inhalation capsule 62.5 mcg  1 capsule Inhalation Daily Gris Bingham DO   62.5 mcg at 04/14/24 0612    glucagon (human recombinant) injection 1 mg  1 mg Intramuscular PRN Jeny Marie, FNP        glucose chewable tablet 16 g  16 g Oral PRN Jeny Marie L, FNP        glucose chewable tablet 24 g  24 g Oral PRN Jeny Marie L, FNP        losartan tablet 50 mg  50 mg Oral Daily Gris Bingham DO   50 mg at 04/14/24 0933    And    hydroCHLOROthiazide tablet 12.5 mg  12.5 mg Oral Daily Gris Bingham DO   12.5 mg at 04/14/24 0933    insulin aspart U-100 injection 1-10 Units  1-10 Units Subcutaneous QID (AC + HS) PRN Jeny Marie L, FNP        insulin detemir U-100 injection 52 Units  52 Units Subcutaneous QHS Gris Bingham DO   52 Units at 04/13/24 2230    isosorbide mononitrate 24 hr tablet 60 mg  60 mg Oral Daily Gris Bingham DO   60 mg at 04/14/24 0933    linaCLOtide capsule 145 mcg  145 mcg Oral Before breakfast Gris Bingham DO   145 mcg at 04/10/24 0621    meclizine tablet 25 mg  25 mg Oral TID PRN Gris Bingham DO        melatonin tablet 6 mg  6 mg Oral Nightly PRN Gris Bingham DO        methocarbamoL tablet 500 mg  500 mg Oral Q8H PRN Jeyn Marie, FNP   500 mg at 04/11/24 2110    montelukast tablet 10 mg  10 mg Oral QHS Gris Bingham DO   10 mg at 04/13/24 2228    ondansetron injection 4 mg  4 mg Intravenous Q6H PRN Gris Bingham DO        propranoloL tablet 10 mg  10 mg Oral TID Gris Bingham DO   10 mg at 04/14/24 0933    senna-docusate 8.6-50 mg per tablet 1 tablet  1 tablet Oral Daily PRN Gris Bingham  DO        traMADoL tablet 50 mg  50 mg Oral Q4H PRN Gris Bingham, DO   50 mg at 24 0142     Family History       Problem Relation (Age of Onset)    No Known Problems Mother, Father          Tobacco Use    Smoking status: Former     Current packs/day: 0.00     Types: Cigarettes     Quit date:      Years since quittin.2    Smokeless tobacco: Never   Substance and Sexual Activity    Alcohol use: Never    Drug use: Never    Sexual activity: Not on file     Review of Systems   Constitutional:  Positive for activity change and fatigue. Negative for appetite change and fever.   Respiratory:  Negative for chest tightness and shortness of breath.    Cardiovascular:  Negative for chest pain and leg swelling.   Gastrointestinal:  Negative for abdominal distention, abdominal pain, constipation, diarrhea, nausea and vomiting.   Musculoskeletal:  Positive for arthralgias and myalgias.   Skin:  Negative for rash and wound.   Neurological:  Positive for weakness. Negative for dizziness.     Objective:     Vital Signs (Most Recent):  Temp: 97.7 °F (36.5 °C) (24)  Pulse: 73 (24)  Resp: 18 (24)  BP: 116/75 (standing) (24)  SpO2: 97 % (24) Vital Signs (24h Range):  Temp:  [97.3 °F (36.3 °C)-98 °F (36.7 °C)] 97.7 °F (36.5 °C)  Pulse:  [61-73] 73  Resp:  [18] 18  SpO2:  [97 %-99 %] 97 %  BP: (116-145)/(72-83) 116/75     Weight: 88.9 kg (195 lb 15.8 oz)  Body mass index is 32.61 kg/m².     Physical Exam  Vitals and nursing note reviewed.   Constitutional:       General: She is not in acute distress.     Appearance: Normal appearance.   HENT:      Head: Normocephalic and atraumatic.      Nose: Nose normal.   Eyes:      Conjunctiva/sclera: Conjunctivae normal.   Cardiovascular:      Rate and Rhythm: Normal rate and regular rhythm.      Pulses: Normal pulses.      Heart sounds: Normal heart sounds. No murmur heard.     No gallop.   Pulmonary:      Effort: Pulmonary  effort is normal.      Breath sounds: Normal breath sounds. No wheezing, rhonchi or rales.   Abdominal:      General: Bowel sounds are normal. There is no distension.      Palpations: Abdomen is soft.      Tenderness: There is no abdominal tenderness. There is no guarding.   Musculoskeletal:         General: No deformity.      Cervical back: Normal range of motion and neck supple.      Right lower leg: No edema.      Left lower leg: No edema.      Comments: Decreased ROM left shoulder. Sling in place.       Skin:     General: Skin is warm and dry.      Findings: No rash.   Neurological:      General: No focal deficit present.      Mental Status: She is alert and oriented to person, place, and time. Mental status is at baseline.      Gait: Gait normal.   Psychiatric:         Mood and Affect: Mood normal.         Thought Content: Thought content normal.         Judgment: Judgment normal.                Significant Labs: All pertinent labs within the past 24 hours have been reviewed.  Recent Lab Results         04/13/24  2033   04/13/24  1700   04/13/24  1052        POCT Glucose 132   121   77               Significant Imaging: I have reviewed all pertinent imaging results/findings within the past 24 hours.

## 2024-04-14 NOTE — ASSESSMENT & PLAN NOTE
Patient's FSGs are controlled on current medication regimen.  Last A1c reviewed-   Lab Results   Component Value Date    HGBA1C 5.2 03/12/2024     Most recent fingerstick glucose reviewed-   Recent Labs   Lab 04/13/24  1052 04/13/24  1700 04/13/24 2033   POCTGLUCOSE 77 121* 132*       Current correctional scale  moderate  Maintain anti-hyperglycemic dose as follows-   Antihyperglycemics (From admission, onward)    Start     Stop Route Frequency Ordered    04/09/24 2100  insulin detemir U-100 injection 52 Units         -- SubQ Nightly 04/09/24 1256    04/09/24 1107  insulin aspart U-100 injection 1-10 Units         -- SubQ Before meals & nightly PRN 04/09/24 1107        Hold Oral hypoglycemics while patient is in the hospital.

## 2024-04-14 NOTE — ASSESSMENT & PLAN NOTE
Chronic, controlled. Latest blood pressure and vitals reviewed-     Temp:  [97.3 °F (36.3 °C)-98 °F (36.7 °C)]   Pulse:  [61-73]   Resp:  [18]   BP: (116-145)/(72-83)   SpO2:  [97 %-99 %] .   Home meds for hypertension were reviewed and noted below.   Hypertension Medications               isosorbide mononitrate (IMDUR) 60 MG 24 hr tablet Take 60 mg by mouth once daily.    losartan-hydrochlorothiazide 50-12.5 mg (HYZAAR) 50-12.5 mg per tablet Take 1 tablet by mouth once daily.    nitroGLYCERIN (NITROSTAT) 0.4 MG SL tablet Place 0.4 mg under the tongue every 5 (five) minutes as needed for Chest pain.    propranoloL (INDERAL) 10 MG tablet Take 10 mg by mouth 3 (three) times daily.            While in the hospital, will manage blood pressure as follows; Continue home antihypertensive regimen    Will utilize p.r.n. blood pressure medication only if patient's blood pressure greater than 160/100 and she develops symptoms such as worsening chest pain or shortness of breath.

## 2024-04-15 LAB
POCT GLUCOSE: 111 MG/DL (ref 70–110)
POCT GLUCOSE: 125 MG/DL (ref 70–110)
POCT GLUCOSE: 159 MG/DL (ref 70–110)
POCT GLUCOSE: 86 MG/DL (ref 70–110)

## 2024-04-15 PROCEDURE — 25000003 PHARM REV CODE 250: Performed by: NURSE PRACTITIONER

## 2024-04-15 PROCEDURE — 97129 THER IVNTJ 1ST 15 MIN: CPT

## 2024-04-15 PROCEDURE — 97130 THER IVNTJ EA ADDL 15 MIN: CPT

## 2024-04-15 PROCEDURE — 97530 THERAPEUTIC ACTIVITIES: CPT

## 2024-04-15 PROCEDURE — 94761 N-INVAS EAR/PLS OXIMETRY MLT: CPT

## 2024-04-15 PROCEDURE — 94640 AIRWAY INHALATION TREATMENT: CPT

## 2024-04-15 PROCEDURE — 11000004 HC SNF PRIVATE

## 2024-04-15 PROCEDURE — 63600175 PHARM REV CODE 636 W HCPCS: Performed by: INTERNAL MEDICINE

## 2024-04-15 PROCEDURE — 25000003 PHARM REV CODE 250: Performed by: INTERNAL MEDICINE

## 2024-04-15 PROCEDURE — 97110 THERAPEUTIC EXERCISES: CPT

## 2024-04-15 PROCEDURE — 99900035 HC TECH TIME PER 15 MIN (STAT)

## 2024-04-15 RX ADMIN — ACETAMINOPHEN 500 MG: 500 TABLET ORAL at 07:04

## 2024-04-15 RX ADMIN — ASPIRIN 81 MG: 81 TABLET, COATED ORAL at 09:04

## 2024-04-15 RX ADMIN — MECLIZINE 25 MG: 12.5 TABLET ORAL at 07:04

## 2024-04-15 RX ADMIN — PROPRANOLOL HYDROCHLORIDE 10 MG: 10 TABLET ORAL at 09:04

## 2024-04-15 RX ADMIN — PROPRANOLOL HYDROCHLORIDE 10 MG: 10 TABLET ORAL at 08:04

## 2024-04-15 RX ADMIN — INSULIN DETEMIR 52 UNITS: 100 INJECTION, SOLUTION SUBCUTANEOUS at 09:04

## 2024-04-15 RX ADMIN — FLUTICASONE FUROATE AND VILANTEROL TRIFENATATE 1 PUFF: 100; 25 POWDER RESPIRATORY (INHALATION) at 06:04

## 2024-04-15 RX ADMIN — ISOSORBIDE MONONITRATE 60 MG: 30 TABLET, EXTENDED RELEASE ORAL at 08:04

## 2024-04-15 RX ADMIN — CARIPRAZINE 1.5 MG: 1.5 CAPSULE, GELATIN COATED ORAL at 08:04

## 2024-04-15 RX ADMIN — BUPROPION HYDROCHLORIDE 150 MG: 150 TABLET, EXTENDED RELEASE ORAL at 08:04

## 2024-04-15 RX ADMIN — UMECLIDINIUM 62.5 MCG: 62.5 AEROSOL, POWDER ORAL at 06:04

## 2024-04-15 RX ADMIN — ESCITALOPRAM OXALATE 20 MG: 10 TABLET ORAL at 08:04

## 2024-04-15 RX ADMIN — DULOXETINE HYDROCHLORIDE 60 MG: 30 CAPSULE, DELAYED RELEASE ORAL at 09:04

## 2024-04-15 RX ADMIN — ACETAMINOPHEN 650 MG: 325 TABLET, FILM COATED ORAL at 06:04

## 2024-04-15 RX ADMIN — ASPIRIN 81 MG: 81 TABLET, COATED ORAL at 08:04

## 2024-04-15 RX ADMIN — LOSARTAN POTASSIUM 50 MG: 25 TABLET, FILM COATED ORAL at 08:04

## 2024-04-15 RX ADMIN — PROPRANOLOL HYDROCHLORIDE 10 MG: 10 TABLET ORAL at 03:04

## 2024-04-15 RX ADMIN — HYDROCHLOROTHIAZIDE 12.5 MG: 12.5 TABLET ORAL at 08:04

## 2024-04-15 RX ADMIN — MONTELUKAST 10 MG: 10 TABLET, FILM COATED ORAL at 09:04

## 2024-04-15 NOTE — PROGRESS NOTES
[facility]  [facility] Medicine  Progress Note    Patient Name: Lazara Casillas  MRN: [id number]  Patient Class: IP- Swing   Admission Date: 4/9/2024  Length of Stay: 6 days  Attending Physician: Gris Bingham DO  Primary Care Provider: Stacey, Primary Doctor        Subjective:     Principal Problem:S/P rotator cuff repair        HPI:  74 yo CF admitted to swing bed on 4/9/24 for PT/OT s/p elective left reverse total shoulder arthroplasty on 4/4/24 by Dr. Watson Hennessy. PMH includes HTN, DMT2, anxiety, depression, frequent falls, constipation, allergies, COPD. Pt's  is at bedside. Medications reviewed with the pt. She reports that she was falling frequently at home due to weakness in her legs. She also has chronic dizziness/imbalance for which she takes meclezine 1-2 times daily. She last fell about 2 months ago when she injured her left shoulder. She currently denies SOB, CP, abd pain. Endorses b/l leg weakness and discomfort, along with left shoulder pain and decreased ROM.     Overview/Hospital Course:  4/10/24: Pt sitting up in chair at bedside. She states she is feeling good this morning. She denies chest discomfort or SOB. She worked with PT/OT this morning.    4/11/24: Pt sitting up in the chair at bedside. She has worked with PT already this AM. She states she is feeling fine. Denies SOB or CP. Does endorse pain in her left shoulder.       4/12/24:Pt sitting up in chair at bedside. She has no complaints this morning. Plan is for DC Wednesday of next week, home with .    4/13/24: Pt resting comfortably in bed. She denies any cp, sob, abd pain. States she is having too many bms, so she declines linzess.     4/14/24: Pt on the commode during first attempt to examine. Revisited. Lying in bed comfortably. No new complaints this AM.     4/15/24: Pt sitting up in chair at bedside. She denies any CP, sob, abd pain.     Past Medical History:   Diagnosis Date    Anxiety disorder,  unspecified     COPD (chronic obstructive pulmonary disease)     Depression     Diabetes mellitus, type 2     Hypertension     YE (obstructive sleep apnea)     Diagnosed years ago-does not sleep with c-pap       Past Surgical History:   Procedure Laterality Date    CHOLECYSTECTOMY      COLONOSCOPY      CORONARY ARTERY BYPASS GRAFT  2017    HYSTERECTOMY      REVERSE TOTAL SHOULDER ARTHROPLASTY Left 4/3/2024    Procedure: ARTHROPLASTY, SHOULDER, TOTAL, REVERSE;  Surgeon: Watson Hennessy MD;  Location: [facility];  Service: Orthopedics;  Laterality: Left;    ROTATOR CUFF REPAIR Right 03/27/2017       Review of patient's allergies indicates:   Allergen Reactions    Sulfa (sulfonamide antibiotics)        Current Facility-Administered Medications   Medication Dose Route Frequency Provider Last Rate Last Admin    acetaminophen tablet 500 mg  500 mg Oral Q6H PRN Gris Bingham DO        acetaminophen tablet 650 mg  650 mg Oral Q6H PRN Gris Bingham DO   650 mg at 04/15/24 0600    albuterol nebulizer solution 0.63 mg  0.63 mg Nebulization Q6H PRN Gris Bingham DO        aluminum-magnesium hydroxide-simethicone 200-200-20 mg/5 mL suspension 30 mL  30 mL Oral Q6H PRN Gris Bingham DO        aspirin EC tablet 81 mg  81 mg Oral BID Jeny Marie, FNP   81 mg at 04/15/24 0836    buPROPion TBSR 12 hr tablet 150 mg  150 mg Oral Daily Gris Bingham DO   150 mg at 04/15/24 0837    calcium carbonate 200 mg calcium (500 mg) chewable tablet 500 mg  500 mg Oral TID PRN Gris Bingham DO        cariprazine Cap 1.5 mg  1.5 mg Oral Daily Gris Bingham DO   1.5 mg at 04/15/24 0837    dextrose 10% bolus 125 mL 125 mL  12.5 g Intravenous PRN Kenneth Marieci L, FNP        dextrose 10% bolus 250 mL 250 mL  25 g Intravenous PRN Jeny Marie L, FNP        diazePAM tablet 10 mg  10 mg Oral BID PRN Gris Bingham DO   10 mg at 04/13/24 1431    DULoxetine DR capsule 60 mg  60 mg Oral QHS Gris Bingham DO   60 mg at  04/14/24 2227    EScitalopram oxalate tablet 20 mg  20 mg Oral Daily Gris Bingham DO   20 mg at 04/15/24 0836    fluticasone furoate-vilanteroL 100-25 mcg/dose diskus inhaler 1 puff  1 puff Inhalation Daily Gris Bingham DO   1 puff at 04/15/24 0605    And    umeclidinium 62.5 mcg/actuation inhalation capsule 62.5 mcg  1 capsule Inhalation Daily Gris Bingham DO   62.5 mcg at 04/15/24 0607    glucagon (human recombinant) injection 1 mg  1 mg Intramuscular PRN Jeny Marie, FNP        glucose chewable tablet 16 g  16 g Oral PRN Jeny Marie FNP        glucose chewable tablet 24 g  24 g Oral PRN Jeny Marie, FNP        losartan tablet 50 mg  50 mg Oral Daily Gris Bingham DO   50 mg at 04/15/24 0836    And    hydroCHLOROthiazide tablet 12.5 mg  12.5 mg Oral Daily Gris Bingham DO   12.5 mg at 04/15/24 0836    insulin aspart U-100 injection 1-10 Units  1-10 Units Subcutaneous QID (AC + HS) PRN Jeny Marie, FNP        insulin detemir U-100 injection 52 Units  52 Units Subcutaneous QHS Gris Bingham DO   52 Units at 04/14/24 2232    isosorbide mononitrate 24 hr tablet 60 mg  60 mg Oral Daily Gris Bingham DO   60 mg at 04/15/24 0836    linaCLOtide capsule 145 mcg  145 mcg Oral Before breakfast Gris Bingham DO   145 mcg at 04/10/24 0621    meclizine tablet 25 mg  25 mg Oral TID PRN Gris Bingham DO   25 mg at 04/14/24 0954    melatonin tablet 6 mg  6 mg Oral Nightly PRN Gris Bingham DO        methocarbamoL tablet 500 mg  500 mg Oral Q8H PRN Jeny Marie, FNP   500 mg at 04/14/24 1846    montelukast tablet 10 mg  10 mg Oral QHS Gris Bingham DO   10 mg at 04/14/24 2227    ondansetron injection 4 mg  4 mg Intravenous Q6H PRN Gris Bingham DO        propranoloL tablet 10 mg  10 mg Oral TID Gris Bingham DO   10 mg at 04/15/24 0836    senna-docusate 8.6-50 mg per tablet 1 tablet  1 tablet Oral Daily PRN Gris Bingham DO        traMADoL tablet 50 mg   50 mg Oral Q4H PRN Gris Bingham, DO   50 mg at 24 2229     Family History       Problem Relation (Age of Onset)    No Known Problems Mother, Father          Tobacco Use    Smoking status: Former     Current packs/day: 0.00     Types: Cigarettes     Quit date:      Years since quittin.2    Smokeless tobacco: Never   Substance and Sexual Activity    Alcohol use: Never    Drug use: Never    Sexual activity: Not on file     Review of Systems   Constitutional:  Positive for activity change and fatigue. Negative for appetite change and fever.   Respiratory:  Negative for chest tightness and shortness of breath.    Cardiovascular:  Negative for chest pain and leg swelling.   Gastrointestinal:  Negative for abdominal distention, abdominal pain, constipation, diarrhea, nausea and vomiting.   Musculoskeletal:  Positive for arthralgias and myalgias.   Skin:  Negative for rash and wound.   Neurological:  Positive for weakness. Negative for dizziness.     Objective:     Vital Signs (Most Recent):  Temp: 97.7 °F (36.5 °C) (04/15/24 0800)  Pulse: 61 (04/15/24 0800)  Resp: 20 (04/15/24 0800)  BP: 125/76 (04/15/24 0800)  SpO2: 95 % (04/15/24 0800) Vital Signs (24h Range):  Temp:  [97.7 °F (36.5 °C)-98.2 °F (36.8 °C)] 97.7 °F (36.5 °C)  Pulse:  [59-76] 61  Resp:  [18-20] 20  SpO2:  [93 %-98 %] 95 %  BP: (109-128)/(72-76) 125/76     Weight: 88.9 kg (195 lb 15.8 oz)  Body mass index is 32.61 kg/m².     Physical Exam  Vitals and nursing note reviewed.   Constitutional:       General: She is not in acute distress.     Appearance: Normal appearance.   HENT:      Head: Normocephalic and atraumatic.      Nose: Nose normal.   Eyes:      Conjunctiva/sclera: Conjunctivae normal.   Cardiovascular:      Rate and Rhythm: Normal rate and regular rhythm.      Pulses: Normal pulses.      Heart sounds: Normal heart sounds. No murmur heard.     No gallop.   Pulmonary:      Effort: Pulmonary effort is normal.      Breath sounds:  Normal breath sounds. No wheezing, rhonchi or rales.   Abdominal:      General: Bowel sounds are normal. There is no distension.      Palpations: Abdomen is soft.      Tenderness: There is no abdominal tenderness. There is no guarding.   Musculoskeletal:         General: No deformity.      Cervical back: Normal range of motion and neck supple.      Right lower leg: No edema.      Left lower leg: No edema.      Comments: Decreased ROM left shoulder. Sling in place.       Skin:     General: Skin is warm and dry.      Findings: No rash.   Neurological:      General: No focal deficit present.      Mental Status: She is alert and oriented to person, place, and time. Mental status is at baseline.      Gait: Gait normal.   Psychiatric:         Mood and Affect: Mood normal.         Thought Content: Thought content normal.         Judgment: Judgment normal.              Significant Labs: All pertinent labs within the past 24 hours have been reviewed.  Recent Lab Results         04/15/24  1032   04/15/24  0603   04/14/24 2002 04/14/24  1726        POCT Glucose 111   86   116   169               Significant Imaging: I have reviewed all pertinent imaging results/findings within the past 24 hours.    Assessment/Plan:      * S/P rotator cuff repair  Admit to swing bed.  PT/OT  Pain control.  ISS  Home/transfer medications reconciled.        Falls frequently  PT/OT      Hypertension  Chronic, controlled. Latest blood pressure and vitals reviewed-     Temp:  [97.7 °F (36.5 °C)-98.2 °F (36.8 °C)]   Pulse:  [59-76]   Resp:  [18-20]   BP: (109-128)/(72-76)   SpO2:  [93 %-98 %] .   Home meds for hypertension were reviewed and noted below.   Hypertension Medications               isosorbide mononitrate (IMDUR) 60 MG 24 hr tablet Take 60 mg by mouth once daily.    losartan-hydrochlorothiazide 50-12.5 mg (HYZAAR) 50-12.5 mg per tablet Take 1 tablet by mouth once daily.    nitroGLYCERIN (NITROSTAT) 0.4 MG SL tablet Place 0.4 mg under  the tongue every 5 (five) minutes as needed for Chest pain.    propranoloL (INDERAL) 10 MG tablet Take 10 mg by mouth 3 (three) times daily.            While in the hospital, will manage blood pressure as follows; Continue home antihypertensive regimen    Will utilize p.r.n. blood pressure medication only if patient's blood pressure greater than 160/100 and she develops symptoms such as worsening chest pain or shortness of breath.    Diabetes mellitus, type 2  Patient's FSGs are controlled on current medication regimen.  Last A1c reviewed-   Lab Results   Component Value Date    HGBA1C 5.2 03/12/2024     Most recent fingerstick glucose reviewed-   Recent Labs   Lab 04/14/24  1726 04/14/24  2002 04/15/24  0603 04/15/24  1032   POCTGLUCOSE 169* 116* 86 111*       Current correctional scale  moderate  Maintain anti-hyperglycemic dose as follows-   Antihyperglycemics (From admission, onward)      Start     Stop Route Frequency Ordered    04/09/24 2100  insulin detemir U-100 injection 52 Units         -- SubQ Nightly 04/09/24 1256    04/09/24 1107  insulin aspart U-100 injection 1-10 Units         -- SubQ Before meals & nightly PRN 04/09/24 1107          Hold Oral hypoglycemics while patient is in the hospital.    Depression  Home meds reconciled.        COPD (chronic obstructive pulmonary disease)  Home meds reconciled.    Anxiety disorder, unspecified  Home meds reconciled.      Traumatic incomplete tear of left rotator cuff  S/p repair    History of coronary artery disease  Home medications reconciled.      Primary osteoarthritis of left shoulder          VTE Risk Mitigation (From admission, onward)           Ordered     Place JOE hose  Until discontinued        Comments: Remove at least daily for skin assessment    04/09/24 1107                    Discharge Planning   DOMINGUEZ: 4/17/2024     Code Status: Not on file   Is the patient medically ready for discharge?:     Reason for patient still in hospital (select all that  apply): Patient trending condition and PT / OT recommendations  Discharge Plan A: Home   Discharge Delays: None known at this time              SKYE COLEMAN DO  Department of Hospital Medicine   [facility]

## 2024-04-15 NOTE — PT/OT/SLP PROGRESS
"         Physical Therapy Treatment Note           Name: Lazara Casillas    : 1950 (73 y.o.)  MRN: [id number]             Subjective Assessment:     No complaints  Lethargic   X Awake, alert, cooperative  Uncooperative    Agitated  c/o pain    Appropriate  c/o fatigue   X Confused  Treated at bedside     Emotionally labile  Treated in gym/dept.    Impulsive  Other:    Flat affect       Pain/Comfort:    Location - Side 1: Left  Location 1: shoulder    Therapy Precautions:     X Cognitive deficits  Spinal precautions    Collar - hard  Sternal precautions    Collar - soft   TLSO   X Fall risk  LSO    Hip precautions - posterior  Knee immobilizer    Hip precautions - anterior  WBAT    Impaired communication  Partial weightbearing    Oxygen  TTWB    PEG tube X NWB LUE in sling    Visual deficits  Other:    Hearing deficits               Mobility Training:     Assist Level Assistive Device Comments    Bed Mobility Sup  Semi-supine to sitting at EOB   Sit to stand/Stand to sit Sup  Sit to stand/stand to sit. Pt stood from EOB and from toilet level.     Transfers SBA  Toilet t/f performed for a +void. Pt performed independent hygiene.  Some assistance required with maneuvering briefs up and down pt's hips.  Pt utilized the grab bar for support while transitioning onto and off of the low toilet.     Gait Sup  Pt able to ambulate 300+ ft with a step through gait pattern and slow gait speed. Though pt's gait remains guarded, this has improved greatly since pt's initial admission.  No overt LOB noted.  Endurance improving.    Balance Training      Wheelchair Mobility      Stair Climbing      Car Transfer      Other:           Assessment:     Patient tolerated session fairly well.  Upon PT arrival, pt expressed the need for the restroom.  PT opened the door to the bathroom and pt stated "oh no, I don't use that".  PT recommended that pt attempt to transition onto and off of the regular toilet in preparation for D/C home. Pt " agreed.  Pt able to perform this transition without any significant difficulty.  Pt expressed no questions or concerns following practice of stair negotiation and car t/fs last week.  D/C planned for Wednesday.        GOALS:   Multidisciplinary Problems       Physical Therapy Goals          Problem: Physical Therapy    Goal Priority Disciplines Outcome Goal Variances Interventions   Physical Therapy Goal     PT, PT/OT Ongoing, Progressing     Description: Patient will increase functional independence with mobility by performin. Supine to sit with Stand-by Assistance  2. Sit to supine with Stand-by Assistance  3. Sit to stand transfer with Supervision  4. Gait  x 250 feet with Supervision using No Assistive Device.   5. Ascend/descend 5 stair with right Handrails Minimal Assistance using No Assistive Device.                            Discharge Recommendations:      Home with 24 hour care and     Discharge Equipment Recommendations:     shower chair     At end of treatment, patient remained:     X Up in chair     In bed   X With alarm activated    Bed rails up   X Call bell in reach      Family/friends present     Restraints secured properly     In bathroom with CNA/RN notified     In gym with PT/PTA/tech     Nurse aware     Other:           PT Start Time: 809     PT Stop Time: 829  PT Total Time (min): 20 min     Billable Minutes: Therapeutic Activity 20      04/15/2024

## 2024-04-15 NOTE — PT/OT/SLP PROGRESS
Occupational Therapy  Treatment    Name: Lazara Casillas    : 1950 (73 y.o.)  MRN: [id number]          TREATMENT SUMMARY AND RECOMMENDATIONS:      Subjective Assessment:   No complaints  Lethargic   x Awake, alert, cooperative  Uncooperative    Agitated x Flat affect   x Appropriate  c/o fatigue    Confused x Treated at bedside     Emotionally liable  Treated in gym/dept.    Impulsive x Other: Pt reported sleeping well last night and feeling OK today.       Pain/Comfort:  Pain Rating 1: 6/10  Location - Side 1: Left  Location 1: shoulder  Pain Addressed 1: Pre-medicate for activity, Reposition, Distraction      Therapy Precautions:   x Cognitive deficits  Spinal precautions    Collar - hard  Sternal precautions    Collar - soft   TLSO   x Fall risk  LSO    Hip precautions - posterior  Knee immobilizer    Hip precautions - anterior  WBAT    Impaired communication  Partial weightbearing    Oxygen  TTWB    PEG tube LUE NWB    Visual deficits x Other:LUE sling at all times.    Hearing deficits           Vitals Value   x Room air      Oxygen (L)     Blood pressure     Heart rate         Treatment Objectives:     Mobility Training:    Mobility task Assist level Comments    Bed mobility     Balance training SBA Dynamic standing bal/xu at sink for 5 mins SBA while performing grooming activity.   Transfer CGA Transfer training wc<>bed CGA/HHA for balance.  Verbal cuing required for proper hand placement   Functional mobility     Sit to stand transitions HHA    Other:       ADL Training:    ADL Assist level Comments    Feeding     Grooming/hygiene SBA ADL grooming activity performed standing at sink. After setup, pt able to perform oral care, faces washing and hair grooming SBA.   Bathing     Upper body dressing     Lower body dressing     Toileting     Toilet transfer     Adaptive equipment training     Other:           Additional Treatment:  While sitting at EOB, removed abduction sling with SBA assist.  Reviewed HEP  with left elbow AAROM flex/ext to xu 1x20 reps followed by AROM forearm/wrist all planes 20 reps with verbal cuing. Educated pt on standing LUE Pendulum ex's 1x10 reps all planes with Mod tactile asssist for proper technique.  Handouts given.    Assessment: Patient tolerated session well.  Noted increase in left elbow ROM and edema is gradually decreasing.  Spoke with pt to infor  to come tomorrow for 9am family training.    OT Plan: Cont POC      GOALS:   Multidisciplinary Problems       Occupational Therapy Goals          Problem: Occupational Therapy    Goal Priority Disciplines Outcome Interventions   Occupational Therapy Goal     OT, PT/OT Ongoing, Progressing    Description:   Patient will increase functional independence with ADLs by performing:    UE Dressing with Moderate Assistance.  LE Dressing with Moderate Assistance.  Toileting from bedside commode with Minimal Assistance for hygiene and clothing management.   Bathing from  edge of bed with Moderate Assistance.  Toilet transfer to bedside commode with Stand-by Assistance.  Left upper extremity exercise program x10 reps per handout, with supervision.                         Communication with Treatment Team:     Discharge Recommendations:    Discharge Equipment Recommendations:  bath bench, bedside commode  Barriers to discharge:  Decreased caregiver support      At end of treatment, patient remained:  x Up in chair     In bed   x With alarm activated    Bed rails up   x Call bell in reach     Family/friends present    Restraints secured properly    In bathroom with CNA/RN notified    In gym with PT/PTA/tech   x Nurse aware    Other:         OT Date of Treatment: 04/15/24  OT Start Time: 0906  OT Stop Time: 0931  OT Total Time (min): 25 min    Billable Minutes: Therapeutic activity: 10 mins, Therapeutic exercise `15 mins.    4/15/2024

## 2024-04-15 NOTE — PT/OT/SLP PROGRESS
Speech Language Pathology Treatment    Patient Name:  Lazara Casillas   MRN:  [id number]  Admitting Diagnosis: S/P rotator cuff repair    Recommendations:                 General Recommendations:  Cognitive-linguistic therapy  Diet recommendations:  Regular Diet - IDDSI Level 7, Liquid Diet Level: Thin liquids - IDDSI Level 0   Aspiration Precautions:  n/a    General Precautions: Standard, fall  Communication strategies:   repeat as needed    Assessment:     Lazara Casillas is a 73 y.o. female with an SLP diagnosis of Cognitive-Linguistic Impairment.  She presents with reduced executive function, working memory, and attention. Poor task switching.    Subjective     Pt participated well and was pleasant throughout.    Patient goals: participate in IADLs at baseline level     Pain/Comfort:   No pain demonstrated or reported    Respiratory Status: Room air    Objective:     SLP initiated functional cognitive assessment with ALVINA. Cues provided after completion of subtests as therapeutic tasks.     Difficulty with time concepts noted within daily math problems tasks. Pt copleted calandar task with 100% accuracy, but reported she frequently misses appointments due to difficulty with recall and no system in place. SLP provided a calendar and educated Pt on time management strategies.    Task Score Level 1-3*   (65+ years)   Telling Time 9/10 1   Counting Money  6/10 2   Addressing Envelope N/a    Daily Math Problems 7/10 1   Writing Checks and Balancing Checkbook N/a    Understadning Medicine Labels 9/10 1   Using a Calendar 10/10 1   Reading Instructions     Using the Telephone     Writing a Phone Message     *Level 1: high probability of independent functioning; level 2: need for some level of assistance; further exploration; level 3: high probability that Pt is not able to function independently     Has the patient been evaluated by SLP for swallowing?   No  Keep patient NPO? No   Current Respiratory Status:    room air      Goals:   Multidisciplinary Problems       SLP Goals          Problem: SLP    Goal Priority Disciplines Outcome   SLP Goal     SLP Ongoing, Progressing   Description: LT. Pt will use cognitive strategies for improved safety and communication.    STGs:  1. Pt will participate in further cognitive assessment and functional assessment with PT. - met   2. Pt will complete personally relevant executive functioning tasks with 90% acc min cues for improved independence with IADLs.                            Plan:   Con't POC  Patient to be seen:   (3-5x/week)   Plan of Care expires:  24  Plan of Care reviewed with:  patient   SLP Follow-Up:  Yes       Discharge recommendations:  Moderate Intensity Therapy   Barriers to Discharge:  Level of Skilled Assistance Needed   and Safety Awareness      Time Tracking:     SLP Treatment Date:   04/15/24  Speech Start Time:  933  Speech Stop Time:  957     Speech Total Time (min):  24 min    Billable Minutes: Cog Tx 24min / 2 units    Sonya Olsen MA, CCC-SLP  04/15/2024

## 2024-04-15 NOTE — ASSESSMENT & PLAN NOTE
Chronic, controlled. Latest blood pressure and vitals reviewed-     Temp:  [97.7 °F (36.5 °C)-98.2 °F (36.8 °C)]   Pulse:  [59-76]   Resp:  [18-20]   BP: (109-128)/(72-76)   SpO2:  [93 %-98 %] .   Home meds for hypertension were reviewed and noted below.   Hypertension Medications               isosorbide mononitrate (IMDUR) 60 MG 24 hr tablet Take 60 mg by mouth once daily.    losartan-hydrochlorothiazide 50-12.5 mg (HYZAAR) 50-12.5 mg per tablet Take 1 tablet by mouth once daily.    nitroGLYCERIN (NITROSTAT) 0.4 MG SL tablet Place 0.4 mg under the tongue every 5 (five) minutes as needed for Chest pain.    propranoloL (INDERAL) 10 MG tablet Take 10 mg by mouth 3 (three) times daily.            While in the hospital, will manage blood pressure as follows; Continue home antihypertensive regimen    Will utilize p.r.n. blood pressure medication only if patient's blood pressure greater than 160/100 and she develops symptoms such as worsening chest pain or shortness of breath.

## 2024-04-15 NOTE — ASSESSMENT & PLAN NOTE
Patient's FSGs are controlled on current medication regimen.  Last A1c reviewed-   Lab Results   Component Value Date    HGBA1C 5.2 03/12/2024     Most recent fingerstick glucose reviewed-   Recent Labs   Lab 04/14/24  1726 04/14/24  2002 04/15/24  0603 04/15/24  1032   POCTGLUCOSE 169* 116* 86 111*       Current correctional scale  moderate  Maintain anti-hyperglycemic dose as follows-   Antihyperglycemics (From admission, onward)    Start     Stop Route Frequency Ordered    04/09/24 2100  insulin detemir U-100 injection 52 Units         -- SubQ Nightly 04/09/24 1256    04/09/24 1107  insulin aspart U-100 injection 1-10 Units         -- SubQ Before meals & nightly PRN 04/09/24 1107        Hold Oral hypoglycemics while patient is in the hospital.

## 2024-04-16 PROCEDURE — 97530 THERAPEUTIC ACTIVITIES: CPT

## 2024-04-16 PROCEDURE — 99900035 HC TECH TIME PER 15 MIN (STAT)

## 2024-04-16 PROCEDURE — 97535 SELF CARE MNGMENT TRAINING: CPT

## 2024-04-16 PROCEDURE — 25000003 PHARM REV CODE 250: Performed by: INTERNAL MEDICINE

## 2024-04-16 PROCEDURE — 63600175 PHARM REV CODE 636 W HCPCS: Performed by: INTERNAL MEDICINE

## 2024-04-16 PROCEDURE — 94640 AIRWAY INHALATION TREATMENT: CPT

## 2024-04-16 PROCEDURE — 97130 THER IVNTJ EA ADDL 15 MIN: CPT

## 2024-04-16 PROCEDURE — 11000004 HC SNF PRIVATE

## 2024-04-16 PROCEDURE — 97129 THER IVNTJ 1ST 15 MIN: CPT

## 2024-04-16 PROCEDURE — 25000003 PHARM REV CODE 250: Performed by: NURSE PRACTITIONER

## 2024-04-16 RX ADMIN — ACETAMINOPHEN 650 MG: 325 TABLET, FILM COATED ORAL at 10:04

## 2024-04-16 RX ADMIN — ESCITALOPRAM OXALATE 20 MG: 10 TABLET ORAL at 09:04

## 2024-04-16 RX ADMIN — UMECLIDINIUM 62.5 MCG: 62.5 AEROSOL, POWDER ORAL at 06:04

## 2024-04-16 RX ADMIN — ASPIRIN 81 MG: 81 TABLET, COATED ORAL at 09:04

## 2024-04-16 RX ADMIN — CARIPRAZINE 1.5 MG: 1.5 CAPSULE, GELATIN COATED ORAL at 09:04

## 2024-04-16 RX ADMIN — INSULIN DETEMIR 52 UNITS: 100 INJECTION, SOLUTION SUBCUTANEOUS at 09:04

## 2024-04-16 RX ADMIN — PROPRANOLOL HYDROCHLORIDE 10 MG: 10 TABLET ORAL at 02:04

## 2024-04-16 RX ADMIN — PROPRANOLOL HYDROCHLORIDE 10 MG: 10 TABLET ORAL at 09:04

## 2024-04-16 RX ADMIN — FLUTICASONE FUROATE AND VILANTEROL TRIFENATATE 1 PUFF: 100; 25 POWDER RESPIRATORY (INHALATION) at 06:04

## 2024-04-16 RX ADMIN — ISOSORBIDE MONONITRATE 60 MG: 30 TABLET, EXTENDED RELEASE ORAL at 09:04

## 2024-04-16 RX ADMIN — TRAMADOL HYDROCHLORIDE 50 MG: 50 TABLET, COATED ORAL at 03:04

## 2024-04-16 RX ADMIN — DIAZEPAM 10 MG: 10 TABLET ORAL at 12:04

## 2024-04-16 RX ADMIN — LOSARTAN POTASSIUM 50 MG: 25 TABLET, FILM COATED ORAL at 09:04

## 2024-04-16 RX ADMIN — HYDROCHLOROTHIAZIDE 12.5 MG: 12.5 TABLET ORAL at 09:04

## 2024-04-16 RX ADMIN — BUPROPION HYDROCHLORIDE 150 MG: 150 TABLET, EXTENDED RELEASE ORAL at 09:04

## 2024-04-16 RX ADMIN — ALUMINUM HYDROXIDE, MAGNESIUM HYDROXIDE, AND DIMETHICONE 30 ML: 200; 20; 200 SUSPENSION ORAL at 11:04

## 2024-04-16 RX ADMIN — DIAZEPAM 10 MG: 10 TABLET ORAL at 09:04

## 2024-04-16 RX ADMIN — ALUMINUM HYDROXIDE, MAGNESIUM HYDROXIDE, AND DIMETHICONE 30 ML: 200; 20; 200 SUSPENSION ORAL at 03:04

## 2024-04-16 RX ADMIN — DULOXETINE HYDROCHLORIDE 60 MG: 30 CAPSULE, DELAYED RELEASE ORAL at 09:04

## 2024-04-16 RX ADMIN — MONTELUKAST 10 MG: 10 TABLET, FILM COATED ORAL at 09:04

## 2024-04-16 NOTE — PT/OT/SLP PROGRESS
Occupational Therapy  Treatment    Name: Lazara Casillas    : 1950 (73 y.o.)  MRN: [id number]          TREATMENT SUMMARY AND RECOMMENDATIONS:      Subjective Assessment:   No complaints  Lethargic   x Awake, alert, cooperative  Uncooperative    Agitated  Flat affect    Appropriate  c/o fatigue    Confused x Treated at bedside     Emotionally liable  Treated in gym/dept.    Impulsive x Other:  verbalized understanding of all training.       Pain/Comfort:  Pain Rating 1: 0/10      Therapy Precautions:   x Cognitive deficits  Spinal precautions    Collar - hard  Sternal precautions    Collar - soft   TLSO   x Fall risk  LSO    Hip precautions - posterior  Knee immobilizer    Hip precautions - anterior  WBAT    Impaired communication  Partial weightbearing    Oxygen  TTWB    PEG tube LUE NWB    Visual deficits x Other:Abd brace at all times.    Hearing deficits           Vitals Value   x Room air      Oxygen (L)     Blood pressure     Heart rate         Treatment Objectives:     Mobility Training:    Mobility task Assist level Comments    Bed mobility     Balance training     Transfer CGA    Functional mobility     Sit to stand transitions CGA    Other:         Additional Treatment:  Pt seen BS with  present for family training.  Educated/demonstrated with  doffing/donning abduction sling.  Assist required to luiza upper and lower extremity garments.  Educated/demonstrated LUE AAROM,AROM, and pendulum ex's, handouts given.  Discussed the importance of having CGA with all functional transfers and the amount of assist required for bathing.  Discussed DME and pt has all required.       Assessment: Patient tolerated session Well.  Family training performed and  reported understanding/confidence with all training. Pt ready for DC tomorrow.    OT Plan: DC tomorrow with HH service with follow with ortho .      GOALS:   Multidisciplinary Problems       Occupational Therapy Goals           Problem: Occupational Therapy    Goal Priority Disciplines Outcome Interventions   Occupational Therapy Goal     OT, PT/OT Ongoing, Progressing    Description:   Patient will increase functional independence with ADLs by performing:    UE Dressing with Moderate Assistance.  LE Dressing with Moderate Assistance.  Toileting from bedside commode with Minimal Assistance for hygiene and clothing management.   Bathing from  edge of bed with Moderate Assistance.  Toilet transfer to bedside commode with Stand-by Assistance.  Left upper extremity exercise program x10 reps per handout, with supervision.                         Communication with Treatment Team:     Discharge Recommendations:    Discharge Equipment Recommendations:  bath bench, bedside commode  Barriers to discharge:  Decreased caregiver support      At end of treatment, patient remained:   Up in chair     In bed    With alarm activated    Bed rails up    Call bell in reach     Family/friends present    Restraints secured properly    In bathroom with CNA/RN notified    In gym with PT/PTA/tech    Nurse aware    Other:         OT Date of Treatment: 04/16/24  OT Start Time: 0900  OT Stop Time: 0925  OT Total Time (min): 25 min    Billable Minutes:Self Care/Home Management 15  Therapeutic Activity 10      4/16/2024

## 2024-04-16 NOTE — PT/OT/SLP DISCHARGE
Speech Language Pathology   Discharge Note     Patient Name:  Lazara Casillas   MRN:  [id number]  Admitting Diagnosis: S/P rotator cuff repair    Recommendations:                 General Recommendations:  Cognitive-linguistic therapy  Diet recommendations:  Regular Diet - IDDSI Level 7, Liquid Diet Level: Thin liquids - IDDSI Level 0   Aspiration Precautions: n/a   General Precautions: Standard, fall  Communication strategies:  repeat as needed    Assessment:     Lazara Casillas is a 73 y.o. female with an SLP diagnosis of Cognitive-Linguistic Impairment.  She presents with reduced executive function, working memory, and attention. Pt presents with poor task switching ability.    Subjective     Pt participated well and was pleasant throughout, as usual. Pt's  was present and participated in education.    Patient goals: participate in IADLs at baseline level     Pain/Comfort:   No pain demonstrated or reported    Respiratory Status: Room air    Objective:     SLP completed functional cognitive assessment with personally relevant portions of the ALVINA. Cues provided after completion of subtests as therapeutic tasks.     Difficulty with time concepts noted within daily math problems tasks. Pt completed calandar task with 100% accuracy, but reported she frequently misses appointments due to difficulty with recall and no system in place. Yesterday, SLP provided a calendar and educated Pt on time management strategies. Strategies for walking and dual tasking reviewed. SLP educated Pt and  on divided attention affects on gait and the possibility of cognitive deficits as part of reason for Pt's frequent falls. SLP reviewed need for neurology consult and results of MRI 2 years ago. Pt and family verbalized understanding.     Task Score Level 1-3*   (65+ years)   Telling Time 9/10 1   Counting Money  6/10 2   Addressing Envelope N/a    Daily Math Problems 7/10 1   Writing Checks and Balancing Checkbook N/a     Understadning Medicine Labels 9/10 1   Using a Calendar 10/10 1   Reading Instructions 9/10 1   Using the Telephone N/a    Writing a Phone Message N/a     *Level 1: high probability of independent functioning; level 2: need for some level of assistance; further exploration; level 3: high probability that Pt is not able to function independently     Has the patient been evaluated by SLP for swallowing?   No  Keep patient NPO? No   Current Respiratory Status:    room air     Goals:   Multidisciplinary Problems       SLP Goals          Problem: SLP    Goal Priority Disciplines Outcome   SLP Goal     SLP Adequate for Care Transition   Description: LT. Pt will use cognitive strategies for improved safety and communication.    STGs:  1. Pt will participate in further cognitive assessment and functional assessment with PT. - met   2. Pt will complete personally relevant executive functioning tasks with 90% acc min cues for improved independence with IADLs.                            Plan:   Pt discharging home with home health tomorrow. SLP rec con't ST services with home health. SLP rec Pt f./u with neuro due to reported cogntive changes over the last 1.5-2 years.     Discharge recommendations:  home health      Time Tracking:     SLP Treatment Date:   24  Speech Start Time:  929  Speech Stop Time:  955     Speech Total Time (min):  26 min    Billable Minutes: Cog Tx 26min / 2 units    Sonya Olsen MA, CCC-SLP  2024

## 2024-04-16 NOTE — PT/OT/SLP PROGRESS
Physical Therapy Treatment Note           Name: Lazara Casillas    : 1950 (73 y.o.)  MRN: [id number]             Subjective Assessment:     No complaints  Lethargic   X Awake, alert, cooperative  Uncooperative    Agitated  c/o pain    Appropriate  c/o fatigue   X Confused  Treated at bedside     Emotionally labile  Treated in gym/dept.    Impulsive  Other:    Flat affect       Pain/Comfort:    Location - Side 1: Left  Location 1: shoulder    Therapy Precautions:     X Cognitive deficits  Spinal precautions    Collar - hard  Sternal precautions    Collar - soft   TLSO   X Fall risk  LSO    Hip precautions - posterior  Knee immobilizer    Hip precautions - anterior  WBAT    Impaired communication  Partial weightbearing    Oxygen  TTWB    PEG tube X NWB LUE in sling    Visual deficits  Other:    Hearing deficits               Mobility Training:     Assist Level Assistive Device Comments    Bed Mobility      Sit to stand/Stand to sit SBA  Sit to stand/stand to sit. Pt stood from EOB and from WC level.  Upon initially standing from EOB, a few attempts were required to reach upright.  PT provided VC for increased anterior weight shifting.    Transfers      Gait Sup  Pt able to ambulate 300+ ft with a step through gait pattern and slow but steady gait speed.  Step length has improved since admission.  One LOB noted while performing a head turn to the R.  Pt able to self-recover this LOB.    Balance Training      Wheelchair Mobility      Stair Climbing Sup  Pt able to negotiate up and down 4 steps using the R sided railing. PT provided no cueing.  Pt able to recall the appropriate technique.     Car Transfer      Other:           Assessment:     Patient tolerated session well and was able to practice step negotiation in preparation for D/C home tomorrow.  Prior to initiating session, pt requested to get dressed.  PT encouraged pt to wait for OT 's arrival for family training at 9:00.      GOALS:    Multidisciplinary Problems       Physical Therapy Goals          Problem: Physical Therapy    Goal Priority Disciplines Outcome Goal Variances Interventions   Physical Therapy Goal     PT, PT/OT Ongoing, Progressing     Description: Patient will increase functional independence with mobility by performin. Supine to sit with Stand-by Assistance  2. Sit to supine with Stand-by Assistance  3. Sit to stand transfer with Supervision  4. Gait  x 250 feet with Supervision using No Assistive Device.   5. Ascend/descend 5 stair with right Handrails Minimal Assistance using No Assistive Device.                            Discharge Recommendations:      Home with 24 hour care and     Discharge Equipment Recommendations:     shower chair     At end of treatment, patient remained:    X  Up in chair     In bed   X With alarm activated    Bed rails up   X Call bell in reach      Family/friends present     Restraints secured properly     In bathroom with CNA/RN notified     In gym with PT/PTA/tech     Nurse aware     Other:           PT Start Time: 814     PT Stop Time: 832  PT Total Time (min): 18 min     Billable Minutes: Therapeutic Activity 2024

## 2024-04-16 NOTE — PROGRESS NOTES
[facility]  [facility] Medicine  Progress Note    Patient Name: Lazara Casillas  MRN: [id number]  Patient Class: IP- Swing   Admission Date: 4/9/2024  Length of Stay: 7 days  Attending Physician: Gris Bingham DO  Primary Care Provider: Stacey, Primary Doctor        Subjective:     Principal Problem:S/P rotator cuff repair        HPI:  74 yo CF admitted to swing bed on 4/9/24 for PT/OT s/p elective left reverse total shoulder arthroplasty on 4/4/24 by Dr. Watson Hennessy. PMH includes HTN, DMT2, anxiety, depression, frequent falls, constipation, allergies, COPD. Pt's  is at bedside. Medications reviewed with the pt. She reports that she was falling frequently at home due to weakness in her legs. She also has chronic dizziness/imbalance for which she takes meclezine 1-2 times daily. She last fell about 2 months ago when she injured her left shoulder. She currently denies SOB, CP, abd pain. Endorses b/l leg weakness and discomfort, along with left shoulder pain and decreased ROM.     Overview/Hospital Course:  4/10/24: Pt sitting up in chair at bedside. She states she is feeling good this morning. She denies chest discomfort or SOB. She worked with PT/OT this morning.    4/11/24: Pt sitting up in the chair at bedside. She has worked with PT already this AM. She states she is feeling fine. Denies SOB or CP. Does endorse pain in her left shoulder.       4/12/24:Pt sitting up in chair at bedside. She has no complaints this morning. Plan is for DC Wednesday of next week, home with .    4/13/24: Pt resting comfortably in bed. She denies any cp, sob, abd pain. States she is having too many bms, so she declines linzess.     4/14/24: Pt on the commode during first attempt to examine. Revisited. Lying in bed comfortably. No new complaints this AM.     4/15/24: Pt sitting up in chair at bedside. She denies any CP, sob, abd pain.     4/16/24-Patient is sitting up in chair.  Voices no complaints at  this time.  Planned for discharge home tomorrow.    Interval History:     Review of Systems   Constitutional:  Positive for activity change.   HENT: Negative.     Eyes: Negative.    Respiratory: Negative.     Cardiovascular: Negative.    Gastrointestinal:  Positive for constipation.   Endocrine: Negative.    Genitourinary: Negative.    Musculoskeletal:  Positive for arthralgias and myalgias.   Skin: Negative.    Allergic/Immunologic: Negative.    Neurological: Negative.    Hematological: Negative.    Psychiatric/Behavioral: Negative.       Objective:     Vital Signs (Most Recent):  Temp: 98.1 °F (36.7 °C) (04/16/24 0021)  Pulse: 72 (04/16/24 0631)  Resp: 18 (04/16/24 0631)  BP: 121/76 (04/16/24 0021)  SpO2: 97 % (04/16/24 0630) Vital Signs (24h Range):  Temp:  [97.4 °F (36.3 °C)-98.1 °F (36.7 °C)] 98.1 °F (36.7 °C)  Pulse:  [63-75] 72  Resp:  [18-20] 18  SpO2:  [97 %-100 %] 97 %  BP: (121-138)/(62-82) 121/76     Weight: 88.9 kg (195 lb 15.8 oz)  Body mass index is 32.61 kg/m².    Intake/Output Summary (Last 24 hours) at 4/16/2024 1043  Last data filed at 4/16/2024 0600  Gross per 24 hour   Intake 780 ml   Output 0 ml   Net 780 ml         Physical Exam  Constitutional:       Appearance: Normal appearance. She is normal weight.   HENT:      Head: Normocephalic and atraumatic.      Nose: Nose normal.      Mouth/Throat:      Mouth: Mucous membranes are moist.      Pharynx: Oropharynx is clear.   Eyes:      Extraocular Movements: Extraocular movements intact.      Conjunctiva/sclera: Conjunctivae normal.      Pupils: Pupils are equal, round, and reactive to light.   Cardiovascular:      Rate and Rhythm: Normal rate and regular rhythm.      Pulses: Normal pulses.      Heart sounds: Normal heart sounds.   Pulmonary:      Effort: Pulmonary effort is normal.      Breath sounds: Normal breath sounds.   Abdominal:      General: Bowel sounds are normal.      Palpations: Abdomen is soft.   Musculoskeletal:         General:  "Tenderness present. Normal range of motion.      Cervical back: Normal range of motion and neck supple.   Skin:     General: Skin is warm and dry.      Capillary Refill: Capillary refill takes 2 to 3 seconds.   Neurological:      General: No focal deficit present.      Mental Status: She is alert and oriented to person, place, and time. Mental status is at baseline.   Psychiatric:         Mood and Affect: Mood normal.         Behavior: Behavior normal.         Thought Content: Thought content normal.         Judgment: Judgment normal.             Significant Labs: All pertinent labs within the past 24 hours have been reviewed.  BMP: No results for input(s): "GLU", "NA", "K", "CL", "CO2", "BUN", "CREATININE", "CALCIUM", "MG" in the last 48 hours.  CBC: No results for input(s): "WBC", "HGB", "HCT", "PLT" in the last 48 hours.  CMP: No results for input(s): "NA", "K", "CL", "CO2", "GLU", "BUN", "CREATININE", "CALCIUM", "PROT", "ALBUMIN", "BILITOT", "ALKPHOS", "AST", "ALT", "ANIONGAP", "EGFRNONAA" in the last 48 hours.    Invalid input(s): "ESTGFAFRICA"  Magnesium: No results for input(s): "MG" in the last 48 hours.    Significant Imaging: I have reviewed all pertinent imaging results/findings within the past 24 hours.    Assessment/Plan:      * S/P rotator cuff repair  Admit to swing bed.  PT/OT  Pain control.  ISS  Home/transfer medications reconciled.        Falls frequently  PT/OT      Hypertension  Chronic, controlled. Latest blood pressure and vitals reviewed-     Temp:  [97.7 °F (36.5 °C)-98.2 °F (36.8 °C)]   Pulse:  [59-76]   Resp:  [18-20]   BP: (109-128)/(72-76)   SpO2:  [93 %-98 %] .   Home meds for hypertension were reviewed and noted below.   Hypertension Medications               isosorbide mononitrate (IMDUR) 60 MG 24 hr tablet Take 60 mg by mouth once daily.    losartan-hydrochlorothiazide 50-12.5 mg (HYZAAR) 50-12.5 mg per tablet Take 1 tablet by mouth once daily.    nitroGLYCERIN (NITROSTAT) 0.4 MG SL " tablet Place 0.4 mg under the tongue every 5 (five) minutes as needed for Chest pain.    propranoloL (INDERAL) 10 MG tablet Take 10 mg by mouth 3 (three) times daily.            While in the hospital, will manage blood pressure as follows; Continue home antihypertensive regimen    Will utilize p.r.n. blood pressure medication only if patient's blood pressure greater than 160/100 and she develops symptoms such as worsening chest pain or shortness of breath.    Diabetes mellitus, type 2  Patient's FSGs are controlled on current medication regimen.  Last A1c reviewed-   Lab Results   Component Value Date    HGBA1C 5.2 03/12/2024     Most recent fingerstick glucose reviewed-   Recent Labs   Lab 04/14/24  1726 04/14/24  2002 04/15/24  0603 04/15/24  1032   POCTGLUCOSE 169* 116* 86 111*       Current correctional scale  moderate  Maintain anti-hyperglycemic dose as follows-   Antihyperglycemics (From admission, onward)      Start     Stop Route Frequency Ordered    04/09/24 2100  insulin detemir U-100 injection 52 Units         -- SubQ Nightly 04/09/24 1256    04/09/24 1107  insulin aspart U-100 injection 1-10 Units         -- SubQ Before meals & nightly PRN 04/09/24 1107          Hold Oral hypoglycemics while patient is in the hospital.    Depression  Home meds reconciled.        COPD (chronic obstructive pulmonary disease)  Home meds reconciled.    Anxiety disorder, unspecified  Home meds reconciled.      Traumatic incomplete tear of left rotator cuff  S/p repair    History of coronary artery disease  Home medications reconciled.      Primary osteoarthritis of left shoulder          VTE Risk Mitigation (From admission, onward)           Ordered     Place JOE hose  Until discontinued        Comments: Remove at least daily for skin assessment    04/09/24 1107                  Resume current meds  Therapy  Planned d/c tomorrow  Discharge Planning   DOMINGUEZ: 4/17/2024     Code Status: Not on file   Is the patient medically  ready for discharge?:     Reason for patient still in hospital (select all that apply): Patient trending condition, Treatment, PT / OT recommendations, and Pending disposition  Discharge Plan A: Home   Discharge Delays: None known at this time              Angel Johnson MD  Department of Hospital Medicine   [facility] - [facility] Surgical Unit

## 2024-04-16 NOTE — SUBJECTIVE & OBJECTIVE
Interval History:     Review of Systems   Constitutional:  Positive for activity change.   HENT: Negative.     Eyes: Negative.    Respiratory: Negative.     Cardiovascular: Negative.    Gastrointestinal:  Positive for constipation.   Endocrine: Negative.    Genitourinary: Negative.    Musculoskeletal:  Positive for arthralgias and myalgias.   Skin: Negative.    Allergic/Immunologic: Negative.    Neurological: Negative.    Hematological: Negative.    Psychiatric/Behavioral: Negative.       Objective:     Vital Signs (Most Recent):  Temp: 98.1 °F (36.7 °C) (04/16/24 0021)  Pulse: 72 (04/16/24 0631)  Resp: 18 (04/16/24 0631)  BP: 121/76 (04/16/24 0021)  SpO2: 97 % (04/16/24 0630) Vital Signs (24h Range):  Temp:  [97.4 °F (36.3 °C)-98.1 °F (36.7 °C)] 98.1 °F (36.7 °C)  Pulse:  [63-75] 72  Resp:  [18-20] 18  SpO2:  [97 %-100 %] 97 %  BP: (121-138)/(62-82) 121/76     Weight: 88.9 kg (195 lb 15.8 oz)  Body mass index is 32.61 kg/m².    Intake/Output Summary (Last 24 hours) at 4/16/2024 1043  Last data filed at 4/16/2024 0600  Gross per 24 hour   Intake 780 ml   Output 0 ml   Net 780 ml         Physical Exam  Constitutional:       Appearance: Normal appearance. She is normal weight.   HENT:      Head: Normocephalic and atraumatic.      Nose: Nose normal.      Mouth/Throat:      Mouth: Mucous membranes are moist.      Pharynx: Oropharynx is clear.   Eyes:      Extraocular Movements: Extraocular movements intact.      Conjunctiva/sclera: Conjunctivae normal.      Pupils: Pupils are equal, round, and reactive to light.   Cardiovascular:      Rate and Rhythm: Normal rate and regular rhythm.      Pulses: Normal pulses.      Heart sounds: Normal heart sounds.   Pulmonary:      Effort: Pulmonary effort is normal.      Breath sounds: Normal breath sounds.   Abdominal:      General: Bowel sounds are normal.      Palpations: Abdomen is soft.   Musculoskeletal:         General: Tenderness present. Normal range of motion.      Cervical  "back: Normal range of motion and neck supple.   Skin:     General: Skin is warm and dry.      Capillary Refill: Capillary refill takes 2 to 3 seconds.   Neurological:      General: No focal deficit present.      Mental Status: She is alert and oriented to person, place, and time. Mental status is at baseline.   Psychiatric:         Mood and Affect: Mood normal.         Behavior: Behavior normal.         Thought Content: Thought content normal.         Judgment: Judgment normal.             Significant Labs: All pertinent labs within the past 24 hours have been reviewed.  BMP: No results for input(s): "GLU", "NA", "K", "CL", "CO2", "BUN", "CREATININE", "CALCIUM", "MG" in the last 48 hours.  CBC: No results for input(s): "WBC", "HGB", "HCT", "PLT" in the last 48 hours.  CMP: No results for input(s): "NA", "K", "CL", "CO2", "GLU", "BUN", "CREATININE", "CALCIUM", "PROT", "ALBUMIN", "BILITOT", "ALKPHOS", "AST", "ALT", "ANIONGAP", "EGFRNONAA" in the last 48 hours.    Invalid input(s): "ESTGFAFRICA"  Magnesium: No results for input(s): "MG" in the last 48 hours.    Significant Imaging: I have reviewed all pertinent imaging results/findings within the past 24 hours.  "

## 2024-04-17 VITALS
HEIGHT: 65 IN | BODY MASS INDEX: 32.65 KG/M2 | RESPIRATION RATE: 18 BRPM | WEIGHT: 196 LBS | TEMPERATURE: 98 F | OXYGEN SATURATION: 97 % | DIASTOLIC BLOOD PRESSURE: 75 MMHG | SYSTOLIC BLOOD PRESSURE: 139 MMHG | HEART RATE: 72 BPM

## 2024-04-17 LAB
POCT GLUCOSE: 100 MG/DL (ref 70–110)
POCT GLUCOSE: 137 MG/DL (ref 70–110)
POCT GLUCOSE: 140 MG/DL (ref 70–110)
POCT GLUCOSE: 76 MG/DL (ref 70–110)
POCT GLUCOSE: 87 MG/DL (ref 70–110)

## 2024-04-17 PROCEDURE — 27100098 HC SPACER

## 2024-04-17 PROCEDURE — 94761 N-INVAS EAR/PLS OXIMETRY MLT: CPT

## 2024-04-17 PROCEDURE — 25000003 PHARM REV CODE 250: Performed by: INTERNAL MEDICINE

## 2024-04-17 PROCEDURE — 25000003 PHARM REV CODE 250: Performed by: NURSE PRACTITIONER

## 2024-04-17 PROCEDURE — 25000242 PHARM REV CODE 250 ALT 637 W/ HCPCS: Performed by: INTERNAL MEDICINE

## 2024-04-17 PROCEDURE — 94640 AIRWAY INHALATION TREATMENT: CPT

## 2024-04-17 RX ORDER — TRAMADOL HYDROCHLORIDE 50 MG/1
50 TABLET ORAL EVERY 4 HOURS PRN
Qty: 20 TABLET | Refills: 0 | Status: SHIPPED | OUTPATIENT
Start: 2024-04-17

## 2024-04-17 RX ORDER — ALBUTEROL SULFATE 90 UG/1
2 AEROSOL, METERED RESPIRATORY (INHALATION) EVERY 6 HOURS PRN
Status: DISCONTINUED | OUTPATIENT
Start: 2024-04-17 | End: 2024-04-17 | Stop reason: HOSPADM

## 2024-04-17 RX ORDER — TRAMADOL HYDROCHLORIDE 50 MG/1
50 TABLET ORAL EVERY 4 HOURS PRN
Qty: 20 TABLET | Refills: 0 | Status: SHIPPED | OUTPATIENT
Start: 2024-04-17 | End: 2024-04-17

## 2024-04-17 RX ORDER — ALBUTEROL SULFATE 90 UG/1
2 AEROSOL, METERED RESPIRATORY (INHALATION) EVERY 6 HOURS PRN
Status: DISCONTINUED | OUTPATIENT
Start: 2024-04-17 | End: 2024-04-17

## 2024-04-17 RX ORDER — METHOCARBAMOL 500 MG/1
500 TABLET, FILM COATED ORAL EVERY 8 HOURS PRN
Qty: 20 TABLET | Refills: 0 | Status: SHIPPED | OUTPATIENT
Start: 2024-04-17 | End: 2024-04-27

## 2024-04-17 RX ORDER — ALBUTEROL SULFATE 0.83 MG/ML
1.25 SOLUTION RESPIRATORY (INHALATION) EVERY 6 HOURS PRN
Status: DISCONTINUED | OUTPATIENT
Start: 2024-04-17 | End: 2024-04-17

## 2024-04-17 RX ORDER — ASPIRIN 81 MG/1
81 TABLET ORAL 2 TIMES DAILY
Qty: 30 TABLET | Refills: 0 | Status: SHIPPED | OUTPATIENT
Start: 2024-04-17 | End: 2025-04-17

## 2024-04-17 RX ORDER — AMOXICILLIN 250 MG
1 CAPSULE ORAL DAILY PRN
Qty: 30 TABLET | Refills: 2 | Status: SHIPPED | OUTPATIENT
Start: 2024-04-17

## 2024-04-17 RX ADMIN — ALBUTEROL SULFATE 2 PUFF: 108 INHALANT RESPIRATORY (INHALATION) at 01:04

## 2024-04-17 RX ADMIN — ISOSORBIDE MONONITRATE 60 MG: 30 TABLET, EXTENDED RELEASE ORAL at 08:04

## 2024-04-17 RX ADMIN — FLUTICASONE FUROATE AND VILANTEROL TRIFENATATE 1 PUFF: 100; 25 POWDER RESPIRATORY (INHALATION) at 06:04

## 2024-04-17 RX ADMIN — ALBUTEROL SULFATE 2 PUFF: 108 INHALANT RESPIRATORY (INHALATION) at 06:04

## 2024-04-17 RX ADMIN — ASPIRIN 81 MG: 81 TABLET, COATED ORAL at 08:04

## 2024-04-17 RX ADMIN — PROPRANOLOL HYDROCHLORIDE 10 MG: 10 TABLET ORAL at 08:04

## 2024-04-17 RX ADMIN — LOSARTAN POTASSIUM 50 MG: 25 TABLET, FILM COATED ORAL at 08:04

## 2024-04-17 RX ADMIN — BUPROPION HYDROCHLORIDE 150 MG: 150 TABLET, EXTENDED RELEASE ORAL at 08:04

## 2024-04-17 RX ADMIN — UMECLIDINIUM 62.5 MCG: 62.5 AEROSOL, POWDER ORAL at 06:04

## 2024-04-17 RX ADMIN — ESCITALOPRAM OXALATE 20 MG: 10 TABLET ORAL at 08:04

## 2024-04-17 RX ADMIN — CARIPRAZINE 1.5 MG: 1.5 CAPSULE, GELATIN COATED ORAL at 08:04

## 2024-04-17 RX ADMIN — HYDROCHLOROTHIAZIDE 12.5 MG: 12.5 TABLET ORAL at 08:04

## 2024-04-17 NOTE — PT/OT/SLP DISCHARGE
Physical Therapy Discharge Summary    Name: Lazara Casillas  MRN: [id number]   Principal Problem: S/P rotator cuff repair     Patient Discharged from acute Physical Therapy on 2024.  Please refer to prior PT noted date on 2024 for functional status.     Assessment:     Patient appropriate for care in another setting.    Prior to D/C, PT provided pt's spouse with a gait belt and provided education regarding appropriate usage.  Stair climbing technique was verbally reviewed. Pt nor spouse expressed any additional questions or concerns.     Objective:     GOALS:   Multidisciplinary Problems       Physical Therapy Goals          Problem: Physical Therapy    Goal Priority Disciplines Outcome Goal Variances Interventions   Physical Therapy Goal     PT, PT/OT Adequate for Care Transition     Description: Patient will increase functional independence with mobility by performin. Supine to sit with Stand-by Assistance  2. Sit to supine with Stand-by Assistance  3. Sit to stand transfer with Supervision  4. Gait  x 250 feet with Supervision using No Assistive Device.   5. Ascend/descend 5 stair with right Handrails Minimal Assistance using No Assistive Device.                          Reasons for Discontinuation of Therapy Services  Transfer to alternate level of care.      Plan:     Patient Discharged to: Home with Home Health Service and 24 hour care.  Transition to outpatient PT recommended ASAP.      2024

## 2024-04-17 NOTE — DISCHARGE INSTRUCTIONS
Pt to D/C home in stable condition.    Keep all follow up appts as scheduled.    Take all medications as prescribed.    Remain non-weight bearing to the left upper extremity until 4/18 appt with Dr Hennessy.    Do not remove abductor brace.     Do not wet incision or put creams on it.

## 2024-04-17 NOTE — DISCHARGE SUMMARY
[facility]  [facility] Medicine  Discharge Summary      Patient Name: Lazara Casillas  MRN: [id number]  VICK: [id number]  Patient Class: IP- Swing  Admission Date: 4/9/2024  Hospital Length of Stay: 8 days  Discharge Date and Time:  04/17/2024 9:50 AM  Attending Physician: Angel Johnson MD   Discharging Provider: Angel Johnson MD  Primary Care Provider: Peter Shrestha FNP    Primary Care Team: Networked reference to record PCT     HPI:   74 yo CF admitted to swing bed on 4/9/24 for PT/OT s/p elective left reverse total shoulder arthroplasty on 4/4/24 by Dr. Watson Hennessy. PMH includes HTN, DMT2, anxiety, depression, frequent falls, constipation, allergies, COPD. Pt's  is at bedside. Medications reviewed with the pt. She reports that she was falling frequently at home due to weakness in her legs. She also has chronic dizziness/imbalance for which she takes meclezine 1-2 times daily. She last fell about 2 months ago when she injured her left shoulder. She currently denies SOB, CP, abd pain. Endorses b/l leg weakness and discomfort, along with left shoulder pain and decreased ROM.     * No surgery found *      Hospital Course:   4/10/24: Pt sitting up in chair at bedside. She states she is feeling good this morning. She denies chest discomfort or SOB. She worked with PT/OT this morning.    4/11/24: Pt sitting up in the chair at bedside. She has worked with PT already this AM. She states she is feeling fine. Denies SOB or CP. Does endorse pain in her left shoulder.       4/12/24:Pt sitting up in chair at bedside. She has no complaints this morning. Plan is for DC Wednesday of next week, home with .    4/13/24: Pt resting comfortably in bed. She denies any cp, sob, abd pain. States she is having too many bms, so she declines linzess.     4/14/24: Pt on the commode during first attempt to examine. Revisited. Lying in bed comfortably. No new complaints this AM.     4/15/24:  Pt sitting up in chair at bedside. She denies any CP, sob, abd pain.     4/16/24-Patient is sitting up in chair.  Voices no complaints at this time.  Planned for discharge home tomorrow.    4/17/24-No new issues today.  Patient is stable for discharge home today.  Exam(A&O, NAD, RRR, CTA, BS +, ROM I except for left shoulder)     Goals of Care Treatment Preferences:         Consults:   Consults (From admission, onward)          Status Ordering Provider     Inpatient consult to Social Work/Case Management  Once        Provider:  (Not yet assigned)    Acknowledged SKYE PRYOR     Inpatient consult to Respiratory Care  Once        Provider:  (Not yet assigned)    Acknowledged FOREIGN BAILEY            No new Assessment & Plan notes have been filed under this hospital service since the last note was generated.  Service: Hospital Medicine    Final Active Diagnoses:    Diagnosis Date Noted POA    PRINCIPAL PROBLEM:  S/P rotator cuff repair [Z98.890] 04/09/2024 Not Applicable    Falls frequently [R29.6] 04/09/2024 Not Applicable    COPD (chronic obstructive pulmonary disease) [J44.9] 04/04/2024 Yes    Depression [F32.A] 04/04/2024 Yes    Anxiety disorder, unspecified [F41.9] 04/04/2024 Yes    Diabetes mellitus, type 2 [E11.9] 04/04/2024 Yes    Hypertension [I10] 04/04/2024 Yes    Primary osteoarthritis of left shoulder [M19.012] 04/03/2024 Yes    Traumatic incomplete tear of left rotator cuff [S46.012A] 04/03/2024 Yes    History of coronary artery disease [Z86.79] 04/03/2024 Not Applicable      Problems Resolved During this Admission:       Discharged Condition: stable    Disposition: Home-Health Care [facility]    Follow Up:   Follow-up Information       Watson Hennessy MD. Go on 4/18/2024.    Specialty: Orthopedic Surgery  Why: @10:15am follow up appt  Contact information:  [address] [place] [address] [address] [place] [id number]  [phone]               Fred Montero MD. Schedule an appointment as soon as possible for  "a visit.    Specialties: Neurology, Sleep Medicine  Why: Referral done. They will call patient with an appt.  Contact information:  [address] [place] [address] Dr Toussaint Salome  [place] [id number] [phone]               Peter Shrestha FNP. Go on 4/24/2024.    Specialty: Internal Medicine  Why: @10:15am follow up appt  Contact information:  [address] [place] [address] [phone]                           Patient Instructions:      Ambulatory referral/consult to Neurology   Standing Status: Future   Referral Priority: Routine Referral Type: Consultation   Referral Reason: Specialty Services Required   Referred to Provider: DALE FELIX Requested Specialty: Neurology   Number of Visits Requested: 1     Ambulatory referral/consult to Home Health   Standing Status: Future   Referral Priority: Routine Referral Type: Home Health   Referral Reason: Specialty Services Required   Requested Specialty: Home Health Services   Number of Visits Requested: 1       Significant Diagnostic Studies: Labs: BMP: No results for input(s): "GLU", "NA", "K", "CL", "CO2", "BUN", "CREATININE", "CALCIUM", "MG" in the last 48 hours., CMP No results for input(s): "NA", "K", "CL", "CO2", "GLU", "BUN", "CREATININE", "CALCIUM", "PROT", "ALBUMIN", "BILITOT", "ALKPHOS", "AST", "ALT", "ANIONGAP", "ESTGFRAFRICA", "EGFRNONAA" in the last 48 hours., and CBC No results for input(s): "WBC", "HGB", "HCT", "PLT" in the last 48 hours.    Pending Diagnostic Studies:       None           Medications:  Reconciled Home Medications:      Medication List        START taking these medications      aspirin 81 MG EC tablet  Commonly known as: ECOTRIN  Take 1 tablet (81 mg total) by mouth 2 (two) times a day.     methocarbamoL 500 MG Tab  Commonly known as: ROBAXIN  Take 1 tablet (500 mg total) by mouth every 8 (eight) hours as needed.     senna-docusate 8.6-50 mg 8.6-50 mg per tablet  Commonly known as: PERICOLACE  Take 1 tablet by mouth daily as needed for " "Constipation.     traMADoL 50 mg tablet  Commonly known as: ULTRAM  Take 1 tablet (50 mg total) by mouth every 4 (four) hours as needed for Pain.            CONTINUE taking these medications      albuterol sulfate 90 mcg/actuation inhaler  Commonly known as: PROAIR RESPICLICK  Inhale 2 puffs into the lungs every 6 (six) hours as needed for Wheezing. Rescue     albuterol-ipratropium 2.5 mg-0.5 mg/3 mL nebulizer solution  Commonly known as: DUO-NEB  Take 3 mLs by nebulization every 6 (six) hours as needed for Wheezing. Rescue     BASAGLAR KWIKPEN U-100 INSULIN glargine 100 units/mL SubQ pen  Generic drug: insulin  Inject 52 Units into the skin every evening.     BD KATY 2ND GEN PEN NEEDLE 32 gauge x 5/32" Ndle  Generic drug: pen needle, diabetic  SMARTSIG:injection 3 Times Daily     buPROPion 150 MG TBSR 12 hr tablet  Commonly known as: WELLBUTRIN SR  Take 150 mg by mouth every morning.     cetirizine 10 mg chewable tablet  Take 10 mg by mouth once daily.     diclofenac sodium 1 % Gel  Commonly known as: VOLTAREN  daily as needed.     doxepin 10 MG capsule  Commonly known as: SINEQUAN  Take 10 mg by mouth every evening.     DULoxetine 60 MG capsule  Commonly known as: CYMBALTA  Take 60 mg by mouth nightly. For restless legs     EScitalopram oxalate 20 MG tablet  Commonly known as: LEXAPRO  Take 20 mg by mouth once daily.     fluticasone propionate 50 mcg/actuation nasal spray  Commonly known as: FLONASE  1 spray by Each Nostril route 2 (two) times daily.     isosorbide mononitrate 60 MG 24 hr tablet  Commonly known as: IMDUR  Take 60 mg by mouth once daily.     LINZESS 145 mcg Cap capsule  Generic drug: linaCLOtide  Take 145 mcg by mouth before breakfast.     losartan-hydrochlorothiazide 50-12.5 mg 50-12.5 mg per tablet  Commonly known as: HYZAAR  Take 1 tablet by mouth once daily.     meclizine 25 mg tablet  Commonly known as: ANTIVERT  Take 25 mg by mouth 3 (three) times daily as needed for Dizziness.   "   montelukast 10 mg tablet  Commonly known as: SINGULAIR  Take 10 mg by mouth every evening.     nitroGLYCERIN 0.4 MG SL tablet  Commonly known as: NITROSTAT  Place 0.4 mg under the tongue every 5 (five) minutes as needed for Chest pain.     ondansetron 8 MG Tbdl  Commonly known as: ZOFRAN-ODT  Take 8 mg by mouth 3 (three) times daily as needed.     ONETOUCH ULTRA TEST Strp  Generic drug: blood sugar diagnostic  3 (three) times daily.     propranoloL 10 MG tablet  Commonly known as: INDERAL  Take 10 mg by mouth 3 (three) times daily.     TRELEGY ELLIPTA 100-62.5-25 mcg Dsdv  Generic drug: fluticasone-umeclidin-vilanter  Inhale 1 puff into the lungs once daily.     VRAYLAR 1.5 mg Cap  Generic drug: cariprazine  Take 1.5 mg by mouth once daily.            STOP taking these medications      potassium chloride 8 MEQ Tbsr  Commonly known as: KLOR-CON              Indwelling Lines/Drains at time of discharge:   Lines/Drains/Airways       None                   Time spent on the discharge of patient: 39 minutes     Patient screened for food insecurity, housing instability, transportation needs, utility difficulties, and interpersonal safety.  Patient will be set up with home health and therapy.    Angel Johnson MD  Department of Hospital Medicine  [facility]

## 2024-04-17 NOTE — PLAN OF CARE
Problem: Adult Inpatient Plan of Care  Goal: Plan of Care Review  Outcome: Met  Goal: Patient-Specific Goal (Individualized)  Outcome: Met  Goal: Absence of Hospital-Acquired Illness or Injury  Outcome: Met  Goal: Optimal Comfort and Wellbeing  Outcome: Met  Goal: Readiness for Transition of Care  Outcome: Met     Problem: Diabetes Comorbidity  Goal: Blood Glucose Level Within Targeted Range  Outcome: Met     Problem: Behavioral Health Comorbidity  Goal: Maintenance of Behavioral Health Symptom Control  Outcome: Met     Problem: COPD (Chronic Obstructive Pulmonary Disease) Comorbidity  Goal: Maintenance of COPD Symptom Control  Outcome: Met     Problem: Obstructive Sleep Apnea Risk or Actual Comorbidity Management  Goal: Unobstructed Breathing During Sleep  Outcome: Met     Problem: Osteoarthritis Comorbidity  Goal: Maintenance of Osteoarthritis Symptom Control  Outcome: Met     Problem: Fall Injury Risk  Goal: Absence of Fall and Fall-Related Injury  Outcome: Met     Problem: Pain Acute  Goal: Acceptable Pain Control and Functional Ability  Outcome: Met     Problem: Gas Exchange Impaired  Goal: Optimal Gas Exchange  Outcome: Met     Problem: Adjustment to Surgery (Shoulder Arthroplasty)  Goal: Optimal Coping  Outcome: Met     Problem: Functional Ability Impaired (Shoulder Arthroplasty)  Goal: Optimal Functional Ability  Outcome: Met     Problem: Neurovascular Compromise (Shoulder Arthroplasty)  Goal: Intact Neurovascular Status  Outcome: Met     Problem: Pain (Shoulder Arthroplasty)  Goal: Acceptable Pain Control  Outcome: Met     Problem: Hypertension Comorbidity  Goal: Blood Pressure in Desired Range  Outcome: Met     Problem: Impaired Wound Healing  Goal: Optimal Wound Healing  Outcome: Met     Problem: Sleep Disturbance  Goal: Adequate Sleep/Rest  Outcome: Met

## 2024-04-17 NOTE — PT/OT/SLP DISCHARGE
Occupational Therapy Discharge Summary    Lazara Casillas  MRN: [id number]   Principal Problem: S/P rotator cuff repair      Patient Discharged from acute Occupational Therapy on 4/17/2024.  Please refer to prior OT note dated 4/16/2024 for functional status.    Assessment:      Patient appropriate for care in another setting.    Objective:     GOALS:   Multidisciplinary Problems       Occupational Therapy Goals          Problem: Occupational Therapy    Goal Priority Disciplines Outcome Interventions   Occupational Therapy Goal     OT, PT/OT Ongoing, Progressing    Description:   Patient will increase functional independence with ADLs by performing:    UE Dressing with Moderate Assistance.(goal Not Met)  LE Dressing with Moderate Assistance. (goal Not Met)  Toileting from bedside commode with Minimal Assistance for hygiene and clothing management. (goal Met)  Bathing from  edge of bed with Moderate Assistance.(goal Not Met)  Toilet transfer to bedside commode with Stand-by Assistance.(goal Not Met)  Left upper extremity exercise program x10 reps per handout, with supervision.(goal Not Met                         Reasons for Discontinuation of Therapy Services  Transfer to alternate level of care.      Plan:     Patient Discharged to: Home with Home Health Service    4/17/2024

## 2024-04-18 ENCOUNTER — OFFICE VISIT (OUTPATIENT)
Dept: ORTHOPEDICS | Facility: CLINIC | Age: 74
End: 2024-04-18
Payer: MEDICARE

## 2024-04-18 ENCOUNTER — PATIENT OUTREACH (OUTPATIENT)
Dept: ADMINISTRATIVE | Facility: CLINIC | Age: 74
End: 2024-04-18
Payer: MEDICARE

## 2024-04-18 VITALS — DIASTOLIC BLOOD PRESSURE: 74 MMHG | HEART RATE: 79 BPM | SYSTOLIC BLOOD PRESSURE: 128 MMHG

## 2024-04-18 DIAGNOSIS — R29.898 WEAKNESS OF BOTH LOWER EXTREMITIES: ICD-10-CM

## 2024-04-18 DIAGNOSIS — R29.6 FALLS FREQUENTLY: ICD-10-CM

## 2024-04-18 DIAGNOSIS — Z96.612 STATUS POST REVERSE TOTAL ARTHROPLASTY OF LEFT SHOULDER: Primary | ICD-10-CM

## 2024-04-18 PROCEDURE — 3044F HG A1C LEVEL LT 7.0%: CPT | Mod: CPTII,,, | Performed by: ORTHOPAEDIC SURGERY

## 2024-04-18 PROCEDURE — 99024 POSTOP FOLLOW-UP VISIT: CPT | Mod: ,,, | Performed by: ORTHOPAEDIC SURGERY

## 2024-04-18 PROCEDURE — 3078F DIAST BP <80 MM HG: CPT | Mod: CPTII,,, | Performed by: ORTHOPAEDIC SURGERY

## 2024-04-18 PROCEDURE — G0180 MD CERTIFICATION HHA PATIENT: HCPCS | Mod: ,,, | Performed by: ORTHOPAEDIC SURGERY

## 2024-04-18 PROCEDURE — 3288F FALL RISK ASSESSMENT DOCD: CPT | Mod: CPTII,,, | Performed by: ORTHOPAEDIC SURGERY

## 2024-04-18 PROCEDURE — 1101F PT FALLS ASSESS-DOCD LE1/YR: CPT | Mod: CPTII,,, | Performed by: ORTHOPAEDIC SURGERY

## 2024-04-18 PROCEDURE — 3074F SYST BP LT 130 MM HG: CPT | Mod: CPTII,,, | Performed by: ORTHOPAEDIC SURGERY

## 2024-04-18 NOTE — PROGRESS NOTES
[facility]  Orthopedic Clinic Note      Chief Complaint:   Chief Complaint   Patient presents with    Left Shoulder - Post-op Evaluation     2 weeks sp  L. TOTAL SHOULDER 4-3-24/GL 7-2-24, present in wheelchair today, LHD, soreness, wearing a sling today, Needs a refill of Voltaren gel     Referring Physician: No ref. provider found      History of Present Illness:    04/03/2024 PROCEDURES:  1. Left Reverse total shoulder arthroplasty  04/18/2024 patient presents 2 weeks status post the above-noted procedure.  Recently discharged from swing bed.  Doing well with no major issues or concerns.  Pain well controlled.  Compliant with abduction sling.  She is continuing to struggle with ambulation secondary to bilateral lower extremity weakness.  This was leading to frequent falls preoperatively.      Past Medical History:   Diagnosis Date    Anxiety disorder, unspecified     COPD (chronic obstructive pulmonary disease)     Depression     Diabetes mellitus, type 2     Hypertension     YE (obstructive sleep apnea)     Diagnosed years ago-does not sleep with c-pap       Past Surgical History:   Procedure Laterality Date    CHOLECYSTECTOMY      COLONOSCOPY      CORONARY ARTERY BYPASS GRAFT  2017    HYSTERECTOMY      REVERSE TOTAL SHOULDER ARTHROPLASTY Left 4/3/2024    Procedure: ARTHROPLASTY, SHOULDER, TOTAL, REVERSE;  Surgeon: Watson Hennessy MD;  Location: [facility];  Service: Orthopedics;  Laterality: Left;    ROTATOR CUFF REPAIR Right 03/27/2017       Current Outpatient Medications   Medication Sig Dispense Refill    albuterol sulfate (PROAIR RESPICLICK) 90 mcg/actuation inhaler Inhale 2 puffs into the lungs every 6 (six) hours as needed for Wheezing. Rescue      albuterol-ipratropium (DUO-NEB) 2.5 mg-0.5 mg/3 mL nebulizer solution Take 3 mLs by nebulization every 6 (six) hours as needed for Wheezing. Rescue      aspirin (ECOTRIN) 81 MG EC tablet Take 1 tablet (81 mg total) by mouth 2 (two) times a day. 30  "tablet 0    BD KATY 2ND GEN PEN NEEDLE 32 gauge x 5/32" Ndle SMARTSIG:injection 3 Times Daily      buPROPion (WELLBUTRIN SR) 150 MG TBSR 12 hr tablet Take 150 mg by mouth every morning.      cetirizine 10 mg chewable tablet Take 10 mg by mouth once daily.      diclofenac sodium (VOLTAREN) 1 % Gel daily as needed.      doxepin (SINEQUAN) 10 MG capsule Take 10 mg by mouth every evening.      DULoxetine (CYMBALTA) 60 MG capsule Take 60 mg by mouth nightly. For restless legs      EScitalopram oxalate (LEXAPRO) 20 MG tablet Take 20 mg by mouth once daily.      fluticasone propionate (FLONASE) 50 mcg/actuation nasal spray 1 spray by Each Nostril route 2 (two) times daily.      fluticasone-umeclidin-vilanter (TRELEGY ELLIPTA) 100-62.5-25 mcg DsDv Inhale 1 puff into the lungs once daily.      insulin (BASAGLAR KWIKPEN U-100 INSULIN) glargine 100 units/mL SubQ pen Inject 52 Units into the skin every evening.      isosorbide mononitrate (IMDUR) 60 MG 24 hr tablet Take 60 mg by mouth once daily.      linaCLOtide (LINZESS) 145 mcg Cap capsule Take 145 mcg by mouth before breakfast.      losartan-hydrochlorothiazide 50-12.5 mg (HYZAAR) 50-12.5 mg per tablet Take 1 tablet by mouth once daily.      meclizine (ANTIVERT) 25 mg tablet Take 25 mg by mouth 3 (three) times daily as needed for Dizziness.      methocarbamoL (ROBAXIN) 500 MG Tab Take 1 tablet (500 mg total) by mouth every 8 (eight) hours as needed. 20 tablet 0    montelukast (SINGULAIR) 10 mg tablet Take 10 mg by mouth every evening.      nitroGLYCERIN (NITROSTAT) 0.4 MG SL tablet Place 0.4 mg under the tongue every 5 (five) minutes as needed for Chest pain.      ondansetron (ZOFRAN-ODT) 8 MG TbDL Take 8 mg by mouth 3 (three) times daily as needed.      ONETOUCH ULTRA TEST Strp 3 (three) times daily.      propranoloL (INDERAL) 10 MG tablet Take 10 mg by mouth 3 (three) times daily.      senna-docusate 8.6-50 mg (PERICOLACE) 8.6-50 mg per tablet Take 1 tablet by mouth daily " as needed for Constipation. 30 tablet 2    traMADoL (ULTRAM) 50 mg tablet Take 1 tablet (50 mg total) by mouth every 4 (four) hours as needed for Pain. 20 tablet 0    VRAYLAR 1.5 mg Cap Take 1.5 mg by mouth once daily.       No current facility-administered medications for this visit.       Review of patient's allergies indicates:   Allergen Reactions    Sulfa (sulfonamide antibiotics)        Family History   Problem Relation Name Age of Onset    No Known Problems Mother      No Known Problems Father         Social History     Socioeconomic History    Marital status:    Tobacco Use    Smoking status: Former     Current packs/day: 0.00     Types: Cigarettes     Quit date:      Years since quittin.3    Smokeless tobacco: Never   Substance and Sexual Activity    Alcohol use: Never    Drug use: Never   Social History Narrative    ** Merged History Encounter **          Social Determinants of Health     Financial Resource Strain: Low Risk  (2024)    Overall Financial Resource Strain (CARDIA)     Difficulty of Paying Living Expenses: Not hard at all   Food Insecurity: No Food Insecurity (2024)    Hunger Vital Sign     Worried About Running Out of Food in the Last Year: Never true     Ran Out of Food in the Last Year: Never true   Transportation Needs: No Transportation Needs (2024)    PRAPARE - Transportation     Lack of Transportation (Medical): No     Lack of Transportation (Non-Medical): No   Stress: Stress Concern Present (2024)    [employer] [place] [employer] - Occupational Stress Questionnaire     Feeling of Stress : To some extent   Social Connections: Unknown (2024)    Social Connection and Isolation Panel [NHANES]     Frequency of Communication with Friends and Family: More than three times a week     Frequency of Social Gatherings with Friends and Family: More than three times a week     Marital Status: Living with partner   Housing Stability: Low Risk  (2024)     Housing Stability Vital Sign     Unable to Pay for Housing in the Last Year: No     Number of Places Lived in the Last Year: 1     Unstable Housing in the Last Year: No           Review of Systems:  All review of systems negative except for those stated in the HPI.    Examination:    Vital Signs:    Vitals:    04/18/24 1028   BP: 128/74   Pulse: 79       There is no height or weight on file to calculate BMI.    Physical Examination:  General: Well-developed, well-nourished.  Neuro: Alert and oriented x 3.  Psych: Normal mood and affect.  Card: Regular rate and rhythm  Resp: Respirations regular and unlabored  Left upper extremity:  Incisions clean, dry, and intact. No signs of infection. Neurovascular intact distally. Sensation intact.  Abduction sling in place.      Imaging:  Prior two views of the left shoulder demonstrate expected postoperative changes following reverse total shoulder arthroplasty.    Assessment: Status post reverse total arthroplasty of left shoulder  -     WHEELCHAIR FOR HOME USE    Falls frequently  -     WHEELCHAIR FOR HOME USE    Weakness of both lower extremities  -     WHEELCHAIR FOR HOME USE        Plan: I reviewed the postoperative x-rays with the patient and fully explained surgical procedure.  Patient will continue with physical therapy.  Abduction pillow removed, but patient will continue resting arm sling for an additional 4 weeks.  She will continue previously prescribed medications as needed for pain.  Order entered for wheelchair for home use.  She is scheduled to see a neurologist regarding bilateral lower extremity weakness and frequent falls.  She is unable to utilize a walker secondary to her recent shoulder procedure and bilateral lower extremity weakness.  Wheelchair will be used for activities of daily living to avoid repeat falls and subsequent injuries.  She will return to clinic in approximately 2 months for re-evaluation.  She and her spouse verbalized understanding  of the plan of care with no further questions.    Watson Hennessy MD personally performed the services described in this documentation, including but not limited to patient's history, physical examination, and assessment and plan of care. All medical record entries made by JUNI Bal were performed at his direction and in his presence. The medical record was reviewed and is accurate and complete.         Follow up in about 2 months (around 6/18/2024) for Reevaluation.      DISCLAIMER: This note may have been dictated using voice recognition software and may contain grammatical errors.     NOTE: Consult report sent to referring provider via [employer] EMR.

## 2024-04-18 NOTE — PROGRESS NOTES
C3 nurse spoke with Lazara Casillas for a TCC post hospital discharge follow up call. The patient has a scheduled [facility] appointment with Peter Shrestha FNP on 4/24/24 @ 10:15.

## 2024-04-23 ENCOUNTER — TELEPHONE (OUTPATIENT)
Dept: ORTHOPEDICS | Facility: CLINIC | Age: 74
End: 2024-04-23
Payer: MEDICARE

## 2024-04-23 DIAGNOSIS — R29.6 FALLS FREQUENTLY: Primary | ICD-10-CM

## 2024-04-23 DIAGNOSIS — Z96.612 STATUS POST REVERSE TOTAL ARTHROPLASTY OF LEFT SHOULDER: ICD-10-CM

## 2024-05-06 DIAGNOSIS — Z96.612 STATUS POST REVERSE TOTAL ARTHROPLASTY OF LEFT SHOULDER: Primary | ICD-10-CM

## 2024-05-14 ENCOUNTER — EXTERNAL HOME HEALTH (OUTPATIENT)
Dept: HOME HEALTH SERVICES | Facility: HOSPITAL | Age: 74
End: 2024-05-14
Payer: MEDICARE

## 2024-06-06 ENCOUNTER — DOCUMENT SCAN (OUTPATIENT)
Dept: HOME HEALTH SERVICES | Facility: HOSPITAL | Age: 74
End: 2024-06-06
Payer: MEDICARE

## 2024-06-18 ENCOUNTER — OFFICE VISIT (OUTPATIENT)
Dept: ORTHOPEDICS | Facility: CLINIC | Age: 74
End: 2024-06-18
Payer: MEDICARE

## 2024-06-18 VITALS
WEIGHT: 196 LBS | BODY MASS INDEX: 32.65 KG/M2 | HEART RATE: 69 BPM | DIASTOLIC BLOOD PRESSURE: 75 MMHG | HEIGHT: 65 IN | SYSTOLIC BLOOD PRESSURE: 124 MMHG

## 2024-06-18 DIAGNOSIS — Z96.612 STATUS POST REVERSE TOTAL ARTHROPLASTY OF LEFT SHOULDER: Primary | ICD-10-CM

## 2024-06-18 PROCEDURE — 1101F PT FALLS ASSESS-DOCD LE1/YR: CPT | Mod: CPTII,,, | Performed by: ORTHOPAEDIC SURGERY

## 2024-06-18 PROCEDURE — 3074F SYST BP LT 130 MM HG: CPT | Mod: CPTII,,, | Performed by: ORTHOPAEDIC SURGERY

## 2024-06-18 PROCEDURE — 3078F DIAST BP <80 MM HG: CPT | Mod: CPTII,,, | Performed by: ORTHOPAEDIC SURGERY

## 2024-06-18 PROCEDURE — 3044F HG A1C LEVEL LT 7.0%: CPT | Mod: CPTII,,, | Performed by: ORTHOPAEDIC SURGERY

## 2024-06-18 PROCEDURE — 99024 POSTOP FOLLOW-UP VISIT: CPT | Mod: ,,, | Performed by: ORTHOPAEDIC SURGERY

## 2024-06-18 PROCEDURE — 3288F FALL RISK ASSESSMENT DOCD: CPT | Mod: CPTII,,, | Performed by: ORTHOPAEDIC SURGERY

## 2024-06-18 PROCEDURE — 1159F MED LIST DOCD IN RCRD: CPT | Mod: CPTII,,, | Performed by: ORTHOPAEDIC SURGERY

## 2024-06-18 RX ORDER — DIAZEPAM 10 MG/1
TABLET ORAL
COMMUNITY
Start: 2024-05-21

## 2024-06-18 RX ORDER — ATORVASTATIN CALCIUM 40 MG/1
TABLET, FILM COATED ORAL
COMMUNITY

## 2024-06-18 RX ORDER — AMLODIPINE BESYLATE 5 MG/1
TABLET ORAL
COMMUNITY

## 2024-08-04 NOTE — ASSESSMENT & PLAN NOTE
Patient's FSGs are controlled on current medication regimen.  Last A1c reviewed-   Lab Results   Component Value Date    HGBA1C 5.2 03/12/2024     Most recent fingerstick glucose reviewed-   Recent Labs   Lab 04/10/24  1653 04/10/24  1937 04/11/24  1019   POCTGLUCOSE 120* 96 98       Current correctional scale  moderate  Maintain anti-hyperglycemic dose as follows-   Antihyperglycemics (From admission, onward)    Start     Stop Route Frequency Ordered    04/09/24 2100  insulin detemir U-100 injection 52 Units         -- SubQ Nightly 04/09/24 1256    04/09/24 1107  insulin aspart U-100 injection 1-10 Units         -- SubQ Before meals & nightly PRN 04/09/24 1107        Hold Oral hypoglycemics while patient is in the hospital.   1 Principal Discharge DX:	Acute appendicitis

## 2024-09-17 ENCOUNTER — HOSPITAL ENCOUNTER (OUTPATIENT)
Dept: RADIOLOGY | Facility: CLINIC | Age: 74
Discharge: HOME | End: 2024-09-17
Attending: ORTHOPAEDIC SURGERY
Payer: MEDICARE

## 2024-09-17 ENCOUNTER — OFFICE VISIT (OUTPATIENT)
Dept: ORTHOPEDICS | Facility: CLINIC | Age: 74
End: 2024-09-17
Payer: MEDICARE

## 2024-09-17 VITALS — HEART RATE: 63 BPM | DIASTOLIC BLOOD PRESSURE: 67 MMHG | SYSTOLIC BLOOD PRESSURE: 117 MMHG

## 2024-09-17 DIAGNOSIS — Z96.612 STATUS POST REVERSE TOTAL ARTHROPLASTY OF LEFT SHOULDER: Primary | ICD-10-CM

## 2024-09-17 DIAGNOSIS — M25.512 LEFT SHOULDER PAIN, UNSPECIFIED CHRONICITY: ICD-10-CM

## 2024-09-17 DIAGNOSIS — R29.6 FALLS FREQUENTLY: ICD-10-CM

## 2024-09-17 DIAGNOSIS — R26.9 GAIT ABNORMALITY: ICD-10-CM

## 2024-09-17 PROCEDURE — 3074F SYST BP LT 130 MM HG: CPT | Mod: CPTII,,, | Performed by: ORTHOPAEDIC SURGERY

## 2024-09-17 PROCEDURE — 73030 X-RAY EXAM OF SHOULDER: CPT | Mod: LT,,, | Performed by: ORTHOPAEDIC SURGERY

## 2024-09-17 PROCEDURE — 1159F MED LIST DOCD IN RCRD: CPT | Mod: CPTII,,, | Performed by: ORTHOPAEDIC SURGERY

## 2024-09-17 PROCEDURE — 3288F FALL RISK ASSESSMENT DOCD: CPT | Mod: CPTII,,, | Performed by: ORTHOPAEDIC SURGERY

## 2024-09-17 PROCEDURE — 1101F PT FALLS ASSESS-DOCD LE1/YR: CPT | Mod: CPTII,,, | Performed by: ORTHOPAEDIC SURGERY

## 2024-09-17 PROCEDURE — 3044F HG A1C LEVEL LT 7.0%: CPT | Mod: CPTII,,, | Performed by: ORTHOPAEDIC SURGERY

## 2024-09-17 PROCEDURE — 1125F AMNT PAIN NOTED PAIN PRSNT: CPT | Mod: CPTII,,, | Performed by: ORTHOPAEDIC SURGERY

## 2024-09-17 PROCEDURE — 1160F RVW MEDS BY RX/DR IN RCRD: CPT | Mod: CPTII,,, | Performed by: ORTHOPAEDIC SURGERY

## 2024-09-17 PROCEDURE — 3078F DIAST BP <80 MM HG: CPT | Mod: CPTII,,, | Performed by: ORTHOPAEDIC SURGERY

## 2024-09-17 PROCEDURE — 99213 OFFICE O/P EST LOW 20 MIN: CPT | Mod: ,,, | Performed by: ORTHOPAEDIC SURGERY

## 2024-09-17 NOTE — PROGRESS NOTES
[facility]thopaedic Clinic  Orthopedic Clinic Note      Chief Complaint:   Chief Complaint   Patient presents with    Left Shoulder - Follow-up     Patient is here today for follow up for  left total shoulder.  Has been taking meds; pain: Tylenol #3 Which has as needed for pain, which does and does not help.  Patient has also been doing home exercise which  has been helping. PT stated she takes PT 3 times a week.     Referring Physician: No ref. provider found      History of Present Illness:    04/03/2024 PROCEDURES:  1. Left Reverse total shoulder arthroplasty  04/18/2024 patient presents 2 weeks status post the above-noted procedure.  Recently discharged from swing bed.  Doing well with no major issues or concerns.  Pain well controlled.  Compliant with abduction sling.  She is continuing to struggle with ambulation secondary to bilateral lower extremity weakness.  This was leading to frequent falls preoperatively.  06/18/2024 patient presents approximately 3 months status post the above noted procedure. Patient states that she still has some pain in shoulder. Still working with physical therapy 3 times a week and has some soreness after.   09/17/2024 Patient presents 5 months status post the above noted procedure.  She has been performing a home exercise program and is attending formal physical therapy.  She has been taking over the counter medications and prescribed Tylenol #3 as needed.  The main concern is her continued gait instability and weakness.  This was ongoing prior to surgery and continues postoperatively.      Past Medical History:   Diagnosis Date    Anxiety disorder, unspecified     COPD (chronic obstructive pulmonary disease)     Depression     Diabetes mellitus, type 2     Hypertension     YE (obstructive sleep apnea)     Diagnosed years ago-does not sleep with c-pap       Past Surgical History:   Procedure Laterality Date    CHOLECYSTECTOMY      COLONOSCOPY      CORONARY ARTERY BYPASS GRAFT  2017  "   HYSTERECTOMY      REVERSE TOTAL SHOULDER ARTHROPLASTY Left 4/3/2024    Procedure: ARTHROPLASTY, SHOULDER, TOTAL, REVERSE;  Surgeon: Watson Hennessy MD;  Location: [facility];  Service: Orthopedics;  Laterality: Left;    ROTATOR CUFF REPAIR Right 03/27/2017       Current Outpatient Medications   Medication Sig    albuterol sulfate (PROAIR RESPICLICK) 90 mcg/actuation inhaler Inhale 2 puffs into the lungs every 6 (six) hours as needed for Wheezing. Rescue    albuterol-ipratropium (DUO-NEB) 2.5 mg-0.5 mg/3 mL nebulizer solution Take 3 mLs by nebulization every 6 (six) hours as needed for Wheezing. Rescue    amLODIPine (NORVASC) 5 MG tablet 1 tablet Orally Once a day    atorvastatin (LIPITOR) 40 MG tablet 1 tablet Orally Once a day    BD KATY 2ND GEN PEN NEEDLE 32 gauge x 5/32" Ndle SMARTSIG:injection 3 Times Daily    buPROPion (WELLBUTRIN SR) 150 MG TBSR 12 hr tablet Take 150 mg by mouth every morning.    cetirizine 10 mg chewable tablet Take 10 mg by mouth once daily.    diclofenac sodium (VOLTAREN) 1 % Gel daily as needed.    doxepin (SINEQUAN) 10 MG capsule Take 10 mg by mouth every evening.    DULoxetine (CYMBALTA) 60 MG capsule Take 60 mg by mouth nightly. For restless legs    EScitalopram oxalate (LEXAPRO) 20 MG tablet Take 20 mg by mouth once daily.    fluticasone propionate (FLONASE) 50 mcg/actuation nasal spray 1 spray by Each Nostril route 2 (two) times daily.    fluticasone-umeclidin-vilanter (TRELEGY ELLIPTA) 100-62.5-25 mcg DsDv Inhale 1 puff into the lungs once daily.    insulin (BASAGLAR KWIKPEN U-100 INSULIN) glargine 100 units/mL SubQ pen Inject 52 Units into the skin every evening.    isosorbide mononitrate (IMDUR) 60 MG 24 hr tablet Take 60 mg by mouth once daily.    linaCLOtide (LINZESS) 145 mcg Cap capsule Take 145 mcg by mouth before breakfast.    losartan-hydrochlorothiazide 50-12.5 mg (HYZAAR) 50-12.5 mg per tablet Take 1 tablet by mouth once daily.    meclizine (ANTIVERT) 25 mg tablet Take 25 mg " by mouth 3 (three) times daily as needed for Dizziness.    montelukast (SINGULAIR) 10 mg tablet Take 10 mg by mouth every evening.    nitroGLYCERIN (NITROSTAT) 0.4 MG SL tablet Place 0.4 mg under the tongue every 5 (five) minutes as needed for Chest pain.    ondansetron (ZOFRAN-ODT) 8 MG TbDL Take 8 mg by mouth 3 (three) times daily as needed.    ONETOUCH ULTRA TEST Strp 3 (three) times daily.    propranoloL (INDERAL) 10 MG tablet Take 10 mg by mouth 3 (three) times daily.    senna-docusate 8.6-50 mg (PERICOLACE) 8.6-50 mg per tablet Take 1 tablet by mouth daily as needed for Constipation.    traMADoL (ULTRAM) 50 mg tablet Take 1 tablet (50 mg total) by mouth every 4 (four) hours as needed for Pain.    VALIUM 10 mg Tab 1 tablet as needed Orally Twice a day for 30 days    VRAYLAR 1.5 mg Cap Take 1.5 mg by mouth once daily.    aspirin (ECOTRIN) 81 MG EC tablet Take 1 tablet (81 mg total) by mouth 2 (two) times a day. (Patient not taking: Reported on 2024)     No current facility-administered medications for this visit.       Review of patient's allergies indicates:   Allergen Reactions    Sulfa (sulfonamide antibiotics)        Family History   Problem Relation Name Age of Onset    No Known Problems Mother      No Known Problems Father         Social History     Socioeconomic History    Marital status:    Tobacco Use    Smoking status: Former     Current packs/day: 0.00     Types: Cigarettes     Quit date:      Years since quittin.7    Smokeless tobacco: Never   Substance and Sexual Activity    Alcohol use: Never    Drug use: Never   Social History Narrative    ** Merged History Encounter **          Social Determinants of Health     Financial Resource Strain: Low Risk  (2024)    Overall Financial Resource Strain (CARDIA)     Difficulty of Paying Living Expenses: Not hard at all   Food Insecurity: No Food Insecurity (2024)    Hunger Vital Sign     Worried About Running Out of Food in the  Last Year: Never true     Ran Out of Food in the Last Year: Never true   Transportation Needs: No Transportation Needs (4/9/2024)    PRAPARE - Transportation     Lack of Transportation (Medical): No     Lack of Transportation (Non-Medical): No   Stress: Stress Concern Present (4/9/2024)    [employer] [place] [employer] - Occupational Stress Questionnaire     Feeling of Stress : To some extent   Housing Stability: Low Risk  (4/9/2024)    Housing Stability Vital Sign     Unable to Pay for Housing in the Last Year: No     Number of Places Lived in the Last Year: 1     Unstable Housing in the Last Year: No           Review of Systems:  All review of systems negative except for those stated in the HPI.    Examination:    Vital Signs:    Vitals:    09/17/24 1019   BP: 117/67   Pulse: 63   PainSc:   8   PainLoc: Shoulder       There is no height or weight on file to calculate BMI.    Physical Examination:  General: Well-developed, well-nourished.  Neuro: Alert and oriented x 3.  Psych: Normal mood and affect.  Card: Regular rate and rhythm  Resp: Respirations regular and unlabored  Left Shoulder Exam:  No obvious deformity. No medial or lateral scapula winging. Forward flexion to 160 degrees. Negative empty can, Whipple, Drop Arm Test, Manuel, impingement, AC joint tenderness. Negative biceps groove tenderness, Rasheed´s, Yergason´s, Speed test. Negative Apprehension and Relocation test. 4/5 strength, normal skin appearance and palpable pulses distally. Sensibility normal.       Imaging:  Prior two views of the left shoulder demonstrate appropriate placement of prosthesis and no signs of implant loosening.    Assessment: Status post reverse total arthroplasty of left shoulder  -     X-Ray Shoulder 2 or More Views Left; Future; Expected date: 09/17/2024    Gait abnormality    Falls frequently      Plan:  Her shoulder range of motion has improved, but she continues to work on internal rotation.  She will continue  formal physical therapy and transition to a home exercise program as able.  Continue previously prescribed medications as needed.  My main concern is her continued gait instability leading to falls and weakness.  Referral entered to neurology.  She will return to clinic as needed for any additional issues or concerns.  She and her spouse verbalized understanding of the plan of care with no further questions.    Watson Hennessy MD personally performed the services described in this documentation, including but not limited to patient's history, physical examination, and assessment and plan of care. All medical record entries made by JUNI Bal were performed at his direction and in his presence. The medical record was reviewed and is accurate and complete.         Follow up if symptoms worsen or fail to improve.      DISCLAIMER: This note may have been dictated using voice recognition software and may contain grammatical errors.     NOTE: Consult report sent to referring provider via [employer] EMR.

## 2024-12-17 NOTE — SUBJECTIVE & OBJECTIVE
----- Message from Frances Alexander sent at 1/6/2017  4:22 PM CST -----  Contact: [phone]/ self   Patient would like to be seen sooner than the next available appointment. Please advise.      Past Medical History:   Diagnosis Date    Anxiety disorder, unspecified     COPD (chronic obstructive pulmonary disease)     Depression     Diabetes mellitus, type 2     Hypertension     YE (obstructive sleep apnea)     Diagnosed years ago-does not sleep with c-pap       Past Surgical History:   Procedure Laterality Date    CHOLECYSTECTOMY      COLONOSCOPY      CORONARY ARTERY BYPASS GRAFT  2017    HYSTERECTOMY      REVERSE TOTAL SHOULDER ARTHROPLASTY Left 4/3/2024    Procedure: ARTHROPLASTY, SHOULDER, TOTAL, REVERSE;  Surgeon: Watson Hennessy MD;  Location: [facility];  Service: Orthopedics;  Laterality: Left;    ROTATOR CUFF REPAIR Right 03/27/2017       Review of patient's allergies indicates:   Allergen Reactions    Sulfa (sulfonamide antibiotics)        Current Facility-Administered Medications   Medication Dose Route Frequency Provider Last Rate Last Admin    acetaminophen tablet 500 mg  500 mg Oral Q6H PRN Gris Bingham DO        acetaminophen tablet 650 mg  650 mg Oral Q6H PRN Gris Bingham DO   650 mg at 04/15/24 0600    albuterol nebulizer solution 0.63 mg  0.63 mg Nebulization Q6H PRN Gris Bingham DO        aluminum-magnesium hydroxide-simethicone 200-200-20 mg/5 mL suspension 30 mL  30 mL Oral Q6H PRN Gris Bingham DO        aspirin EC tablet 81 mg  81 mg Oral BID Jeny Marie, FNP   81 mg at 04/15/24 0836    buPROPion TBSR 12 hr tablet 150 mg  150 mg Oral Daily Gris Bingham DO   150 mg at 04/15/24 0837    calcium carbonate 200 mg calcium (500 mg) chewable tablet 500 mg  500 mg Oral TID PRN Gris Bingham DO        cariprazine Cap 1.5 mg  1.5 mg Oral Daily Gris Bingham DO   1.5 mg at 04/15/24 0837    dextrose 10% bolus 125 mL 125 mL  12.5 g Intravenous PRN Kenneth Marieci L, FNP        dextrose 10% bolus 250 mL 250 mL  25 g Intravenous PRN Jeny Marie L, FNP        diazePAM tablet 10 mg  10 mg Oral BID PRN Gris Bingham DO   10 mg at 04/13/24 1431    DULoxetine DR capsule  Spoke to patient and advised of message from Dr. Reinoso. Patient agreeable and verbalized understanding. No further questions at this time.    60 mg  60 mg Oral QHS Gris Bingham DO   60 mg at 04/14/24 2227    EScitalopram oxalate tablet 20 mg  20 mg Oral Daily Gris Bingham DO   20 mg at 04/15/24 0836    fluticasone furoate-vilanteroL 100-25 mcg/dose diskus inhaler 1 puff  1 puff Inhalation Daily Gris Bingham DO   1 puff at 04/15/24 0605    And    umeclidinium 62.5 mcg/actuation inhalation capsule 62.5 mcg  1 capsule Inhalation Daily Gris Bingham DO   62.5 mcg at 04/15/24 0607    glucagon (human recombinant) injection 1 mg  1 mg Intramuscular PRN Jeny Marie FNP        glucose chewable tablet 16 g  16 g Oral PRN Jeny Marie FNP        glucose chewable tablet 24 g  24 g Oral PRN Jeny Marie L, FNP        losartan tablet 50 mg  50 mg Oral Daily Gris Bingham DO   50 mg at 04/15/24 0836    And    hydroCHLOROthiazide tablet 12.5 mg  12.5 mg Oral Daily Gris Bingham DO   12.5 mg at 04/15/24 0836    insulin aspart U-100 injection 1-10 Units  1-10 Units Subcutaneous QID (AC + HS) PRN Jeny Marie, FNP        insulin detemir U-100 injection 52 Units  52 Units Subcutaneous QHS Gris Bingham DO   52 Units at 04/14/24 2232    isosorbide mononitrate 24 hr tablet 60 mg  60 mg Oral Daily Gris Bingham DO   60 mg at 04/15/24 0836    linaCLOtide capsule 145 mcg  145 mcg Oral Before breakfast Gris Bingham DO   145 mcg at 04/10/24 0621    meclizine tablet 25 mg  25 mg Oral TID PRN Gris Bingham DO   25 mg at 04/14/24 0954    melatonin tablet 6 mg  6 mg Oral Nightly PRN Gris Bingham DO        methocarbamoL tablet 500 mg  500 mg Oral Q8H PRN Jeny Marie FNP   500 mg at 04/14/24 1846    montelukast tablet 10 mg  10 mg Oral QHS Gris Bingham DO   10 mg at 04/14/24 2227    ondansetron injection 4 mg  4 mg Intravenous Q6H PRN Gris Bingham DO        propranoloL tablet 10 mg  10 mg Oral TID Gris Bingham DO   10 mg at 04/15/24 0836    senna-docusate 8.6-50 mg per tablet 1 tablet  1 tablet Oral Daily  PRN Gris Bingham R, DO        traMADoL tablet 50 mg  50 mg Oral Q4H PRN Gris Bingham, DO   50 mg at 24 2223     Family History       Problem Relation (Age of Onset)    No Known Problems Mother, Father          Tobacco Use    Smoking status: Former     Current packs/day: 0.00     Types: Cigarettes     Quit date:      Years since quittin.2    Smokeless tobacco: Never   Substance and Sexual Activity    Alcohol use: Never    Drug use: Never    Sexual activity: Not on file     Review of Systems   Constitutional:  Positive for activity change and fatigue. Negative for appetite change and fever.   Respiratory:  Negative for chest tightness and shortness of breath.    Cardiovascular:  Negative for chest pain and leg swelling.   Gastrointestinal:  Negative for abdominal distention, abdominal pain, constipation, diarrhea, nausea and vomiting.   Musculoskeletal:  Positive for arthralgias and myalgias.   Skin:  Negative for rash and wound.   Neurological:  Positive for weakness. Negative for dizziness.     Objective:     Vital Signs (Most Recent):  Temp: 97.7 °F (36.5 °C) (04/15/24 0800)  Pulse: 61 (04/15/24 0800)  Resp: 20 (04/15/24 0800)  BP: 125/76 (04/15/24 0800)  SpO2: 95 % (04/15/24 0800) Vital Signs (24h Range):  Temp:  [97.7 °F (36.5 °C)-98.2 °F (36.8 °C)] 97.7 °F (36.5 °C)  Pulse:  [59-76] 61  Resp:  [18-20] 20  SpO2:  [93 %-98 %] 95 %  BP: (109-128)/(72-76) 125/76     Weight: 88.9 kg (195 lb 15.8 oz)  Body mass index is 32.61 kg/m².     Physical Exam  Vitals and nursing note reviewed.   Constitutional:       General: She is not in acute distress.     Appearance: Normal appearance.   HENT:      Head: Normocephalic and atraumatic.      Nose: Nose normal.   Eyes:      Conjunctiva/sclera: Conjunctivae normal.   Cardiovascular:      Rate and Rhythm: Normal rate and regular rhythm.      Pulses: Normal pulses.      Heart sounds: Normal heart sounds. No murmur heard.     No gallop.   Pulmonary:       Effort: Pulmonary effort is normal.      Breath sounds: Normal breath sounds. No wheezing, rhonchi or rales.   Abdominal:      General: Bowel sounds are normal. There is no distension.      Palpations: Abdomen is soft.      Tenderness: There is no abdominal tenderness. There is no guarding.   Musculoskeletal:         General: No deformity.      Cervical back: Normal range of motion and neck supple.      Right lower leg: No edema.      Left lower leg: No edema.      Comments: Decreased ROM left shoulder. Sling in place.       Skin:     General: Skin is warm and dry.      Findings: No rash.   Neurological:      General: No focal deficit present.      Mental Status: She is alert and oriented to person, place, and time. Mental status is at baseline.      Gait: Gait normal.   Psychiatric:         Mood and Affect: Mood normal.         Thought Content: Thought content normal.         Judgment: Judgment normal.              Significant Labs: All pertinent labs within the past 24 hours have been reviewed.  Recent Lab Results         04/15/24  1032   04/15/24  0603   04/14/24  2002   04/14/24  1726        POCT Glucose 111   86   116   169               Significant Imaging: I have reviewed all pertinent imaging results/findings within the past 24 hours.

## 2025-04-10 ENCOUNTER — OFFICE VISIT (OUTPATIENT)
Dept: ORTHOPEDICS | Facility: CLINIC | Age: 75
End: 2025-04-10
Payer: MEDICARE

## 2025-04-10 ENCOUNTER — HOSPITAL ENCOUNTER (OUTPATIENT)
Dept: RADIOLOGY | Facility: CLINIC | Age: 75
Discharge: HOME | End: 2025-04-10
Attending: ORTHOPAEDIC SURGERY
Payer: MEDICARE

## 2025-04-10 VITALS
SYSTOLIC BLOOD PRESSURE: 129 MMHG | HEIGHT: 65 IN | WEIGHT: 214.38 LBS | HEART RATE: 61 BPM | DIASTOLIC BLOOD PRESSURE: 79 MMHG | BODY MASS INDEX: 35.72 KG/M2

## 2025-04-10 DIAGNOSIS — Z98.890 S/P ROTATOR CUFF REPAIR: ICD-10-CM

## 2025-04-10 DIAGNOSIS — M75.101 NONTRAUMATIC ROTATOR CUFF TEAR, RIGHT: ICD-10-CM

## 2025-04-10 DIAGNOSIS — Z96.612 STATUS POST REVERSE TOTAL ARTHROPLASTY OF LEFT SHOULDER: Primary | ICD-10-CM

## 2025-04-10 RX ORDER — IBUPROFEN 200 MG
200 TABLET ORAL EVERY 6 HOURS PRN
COMMUNITY

## 2025-04-10 RX ORDER — CHLORHEXIDINE GLUCONATE ORAL RINSE 1.2 MG/ML
SOLUTION DENTAL
COMMUNITY
Start: 2025-01-13

## 2025-04-10 RX ORDER — LACTULOSE 10 G/15ML
15 SOLUTION ORAL; RECTAL
COMMUNITY
Start: 2025-03-10

## 2025-04-10 RX ORDER — INSULIN LISPRO 100 [IU]/ML
INJECTION, SOLUTION INTRAVENOUS; SUBCUTANEOUS
COMMUNITY
Start: 2025-03-18

## 2025-04-10 RX ORDER — POTASSIUM CHLORIDE 600 MG/1
8 CAPSULE ORAL
COMMUNITY
Start: 2025-03-19

## 2025-04-10 RX ORDER — PROMETHAZINE HYDROCHLORIDE 25 MG/1
25 TABLET ORAL 3 TIMES DAILY
COMMUNITY
Start: 2024-12-14

## 2025-04-10 NOTE — PROGRESS NOTES
"Subjective:      Patient ID: Lazara Casillas is a 74 y.o. female.    Chief Complaint: Follow-up of the Left Shoulder (F/u left shoulder pain, finished PT a while back- does HEP, patient states "once in a while it will grab me", takes ibuprofen PRN with some relief ) and Pain of the Right Shoulder (Right shoulder pain, started a couple weeks, prior RTC sx 3/27/17 with Dr. hennessy)    HPI:     History of Present Illness    CHIEF COMPLAINT:  - Right shoulder pain    HPI:  Lazara presents for follow-up evaluation of right shoulder pain, s/p rotator cuff repair. Her right shoulder continues to cause discomfort, particularly at night, causing significant sleep disturbance. She reports her current status as satisfactory despite ongoing symptoms. She had a reverse shoulder replacement on the left side about a year ago.    SURGICAL HISTORY:  - Left shoulder reverse shoulder arthroplasty: approximately 1 year ago  - Right shoulder rotator cuff repair: date not specified      ROS:  Musculoskeletal: +joint pain, +limb pain, +nightime pain          Past Medical History:   Diagnosis Date    Anxiety disorder, unspecified     COPD (chronic obstructive pulmonary disease)     Depression     Diabetes mellitus, type 2     Hypertension     YE (obstructive sleep apnea)     Diagnosed years ago-does not sleep with c-pap     Past Surgical History:   Procedure Laterality Date    CHOLECYSTECTOMY      COLONOSCOPY      CORONARY ARTERY BYPASS GRAFT  2017    HYSTERECTOMY      REVERSE TOTAL SHOULDER ARTHROPLASTY Left 4/3/2024    Procedure: ARTHROPLASTY, SHOULDER, TOTAL, REVERSE;  Surgeon: Watson Hennessy MD;  Location: [facility];  Service: Orthopedics;  Laterality: Left;    ROTATOR CUFF REPAIR Right 03/27/2017     Social History[1]    Current Medications[2]  Review of patient's allergies indicates:   Allergen Reactions    Sulfa (sulfonamide antibiotics)        /79 (BP Location: Left arm, Patient Position: Sitting)   Pulse 61   Ht 5' 5" (1.651 m)   " Wt 97.3 kg (214 lb 6.4 oz)   BMI 35.68 kg/m²     Comprehensive review of systems completed and negative except as per HPI.        Objective:   General: Well-developed, well-nourished.  Neuro: Alert and oriented x 3.  Psych: Normal mood and affect.  Card: Regular rate and rhythm  Resp: Respirations regular and unlabored    Shoulder Exam:  No obvious deformity. No medial or lateral scapula winging. Forward flexion to 140 degrees and abduction to 140 degrees. Positive empty can,  positive Whipple, Positive drop arm test, Manuel, impingement, Positive AC joint tenderness. Negative biceps groove tenderness, Positive Rasheed´s, Yergason´s, Speed test. Negative Apprehension and Relocation test. 4/5 strength, normal skin appearance and palpable pulses distally. Sensibility normal.        Assessment:         1. Status post reverse total arthroplasty of left shoulder    2. S/P rotator cuff repair    3. Nontraumatic rotator cuff tear, right          Plan:       Orders Placed This Encounter    X-ray Shoulder 2 or More Views Right    MRI Shoulder Without Contrast Right        Imaging and exam findings discussed.     Assessment & Plan    PROCEDURES:  - # Procedures  - Discussed potential need for repeat surgery on the right shoulder.  However I am concerned about her history of recurrent falls.  We may treat her conservatively with periodic corticosteroid injections in the right shoulder.    IMAGING:  - Ordered MRI Right Shoulder to evaluate rotator cuff condition.    FOLLOW UP:  - Follow up after imaging study.               All questions were answered. Patient happy and in agreement with the plan.     This note was generated with the assistance of ambient listening technology. Verbal consent was obtained by the patient and accompanying visitor(s) for the recording of patient appointment to facilitate this note. I attest to having reviewed and edited the generated note for accuracy, though some syntax or spelling errors may  persist. Please contact the author of this note for any clarification.         [1]   Social History  Socioeconomic History    Marital status:    Tobacco Use    Smoking status: Former     Current packs/day: 0.00     Types: Cigarettes     Quit date:      Years since quittin.2    Smokeless tobacco: Never   Substance and Sexual Activity    Alcohol use: Never    Drug use: Never   Social History Narrative    ** Merged History Encounter **          Social Drivers of Health     Financial Resource Strain: Low Risk  (2024)    Overall Financial Resource Strain (CARDIA)     Difficulty of Paying Living Expenses: Not hard at all   Food Insecurity: No Food Insecurity (2024)    Hunger Vital Sign     Worried About Running Out of Food in the Last Year: Never true     Ran Out of Food in the Last Year: Never true   Transportation Needs: No Transportation Needs (2024)    PRAPARE - Transportation     Lack of Transportation (Medical): No     Lack of Transportation (Non-Medical): No   Stress: Stress Concern Present (2024)    [employer] - Occupational Stress Questionnaire     Feeling of Stress : To some extent   Housing Stability: Low Risk  (2024)    Housing Stability Vital Sign     Unable to Pay for Housing in the Last Year: No     Number of Places Lived in the Last Year: 1     Unstable Housing in the Last Year: No   [2]   Current Outpatient Medications:     albuterol sulfate (PROAIR RESPICLICK) 90 mcg/actuation inhaler, Inhale 2 puffs into the lungs every 6 (six) hours as needed for Wheezing. Rescue, Disp: , Rfl:     albuterol-ipratropium (DUO-NEB) 2.5 mg-0.5 mg/3 mL nebulizer solution, Take 3 mLs by nebulization every 6 (six) hours as needed for Wheezing. Rescue, Disp: , Rfl:     amLODIPine (NORVASC) 5 MG tablet, 1 tablet Orally Once a day, Disp: , Rfl:     atorvastatin (LIPITOR) 40 MG tablet, 1 tablet Orally Once a day, Disp: , Rfl:     BD KATY 2ND GEN PEN NEEDLE 32 gauge  "x " Ndle, SMARTSIG:injection 3 Times Daily, Disp: , Rfl:     buPROPion (WELLBUTRIN SR) 150 MG TBSR 12 hr tablet, Take 150 mg by mouth every morning., Disp: , Rfl:     cetirizine 10 mg chewable tablet, Take 10 mg by mouth once daily., Disp: , Rfl:     chlorhexidine (PERIDEX) 0.12 % solution, SMARTSI-10 Milliliter(s) By Mouth 3 Times Daily, Disp: , Rfl:     diclofenac sodium (VOLTAREN) 1 % Gel, daily as needed., Disp: , Rfl:     doxepin (SINEQUAN) 10 MG capsule, Take 10 mg by mouth every evening., Disp: , Rfl:     DULoxetine (CYMBALTA) 60 MG capsule, Take 60 mg by mouth nightly. For restless legs, Disp: , Rfl:     EScitalopram oxalate (LEXAPRO) 20 MG tablet, Take 20 mg by mouth once daily., Disp: , Rfl:     fluticasone propionate (FLONASE) 50 mcg/actuation nasal spray, 1 spray by Each Nostril route 2 (two) times daily., Disp: , Rfl:     fluticasone-umeclidin-vilanter (TRELEGY ELLIPTA) 100-62.5-25 mcg DsDv, Inhale 1 puff into the lungs once daily., Disp: , Rfl:     ibuprofen (ADVIL,MOTRIN) 200 MG tablet, Take 200 mg by mouth every 6 (six) hours as needed for Pain., Disp: , Rfl:     insulin (BASAGLAR KWIKPEN U-100 INSULIN) glargine 100 units/mL SubQ pen, Inject 52 Units into the skin every evening., Disp: , Rfl:     insulin lispro 100 unit/mL pen, SMARTSI-14 Unit(s) SUB-Q 3 Times Daily, Disp: , Rfl:     isosorbide mononitrate (IMDUR) 60 MG 24 hr tablet, Take 60 mg by mouth once daily., Disp: , Rfl:     lactulose (CHRONULAC) 10 gram/15 mL solution, Take 15 mLs by mouth., Disp: , Rfl:     linaCLOtide (LINZESS) 145 mcg Cap capsule, Take 145 mcg by mouth before breakfast., Disp: , Rfl:     losartan-hydrochlorothiazide 50-12.5 mg (HYZAAR) 50-12.5 mg per tablet, Take 1 tablet by mouth once daily., Disp: , Rfl:     meclizine (ANTIVERT) 25 mg tablet, Take 25 mg by mouth 3 (three) times daily as needed for Dizziness., Disp: , Rfl:     montelukast (SINGULAIR) 10 mg tablet, Take 10 mg by mouth every evening., Disp: , " Rfl:     nitroGLYCERIN (NITROSTAT) 0.4 MG SL tablet, Place 0.4 mg under the tongue every 5 (five) minutes as needed for Chest pain., Disp: , Rfl:     ondansetron (ZOFRAN-ODT) 8 MG TbDL, Take 8 mg by mouth 3 (three) times daily as needed., Disp: , Rfl:     ONETOUCH ULTRA TEST Strp, 3 (three) times daily., Disp: , Rfl:     potassium chloride (MICRO-K) 8 mEq CpSR, Take 8 mEq by mouth., Disp: , Rfl:     promethazine (PHENERGAN) 25 MG tablet, Take 25 mg by mouth 3 (three) times daily., Disp: , Rfl:     propranoloL (INDERAL) 10 MG tablet, Take 10 mg by mouth 3 (three) times daily., Disp: , Rfl:     senna-docusate 8.6-50 mg (PERICOLACE) 8.6-50 mg per tablet, Take 1 tablet by mouth daily as needed for Constipation., Disp: 30 tablet, Rfl: 2    traMADoL (ULTRAM) 50 mg tablet, Take 1 tablet (50 mg total) by mouth every 4 (four) hours as needed for Pain., Disp: 20 tablet, Rfl: 0    VALIUM 10 mg Tab, 1 tablet as needed Orally Twice a day for 30 days, Disp: , Rfl:     VRAYLAR 1.5 mg Cap, Take 1.5 mg by mouth once daily., Disp: , Rfl:     aspirin (ECOTRIN) 81 MG EC tablet, Take 1 tablet (81 mg total) by mouth 2 (two) times a day. (Patient not taking: Reported on 4/10/2025), Disp: 30 tablet, Rfl: 0

## 2025-04-17 ENCOUNTER — HOSPITAL ENCOUNTER (OUTPATIENT)
Dept: RADIOLOGY | Facility: HOSPITAL | Age: 75
Discharge: HOME | End: 2025-04-17
Attending: ORTHOPAEDIC SURGERY
Payer: MEDICARE

## 2025-04-17 ENCOUNTER — OFFICE VISIT (OUTPATIENT)
Dept: ORTHOPEDICS | Facility: CLINIC | Age: 75
End: 2025-04-17
Payer: MEDICARE

## 2025-04-17 VITALS
SYSTOLIC BLOOD PRESSURE: 139 MMHG | BODY MASS INDEX: 35.65 KG/M2 | DIASTOLIC BLOOD PRESSURE: 79 MMHG | HEIGHT: 65 IN | HEART RATE: 59 BPM | WEIGHT: 214 LBS

## 2025-04-17 DIAGNOSIS — M54.12 CERVICAL RADICULOPATHY: ICD-10-CM

## 2025-04-17 DIAGNOSIS — M75.101 NONTRAUMATIC ROTATOR CUFF TEAR, RIGHT: Primary | ICD-10-CM

## 2025-04-17 PROCEDURE — 72050 X-RAY EXAM NECK SPINE 4/5VWS: CPT | Mod: TC

## 2025-04-17 NOTE — PROGRESS NOTES
"Subjective:      Patient ID: Lazara Casillas is a 74 y.o. female.    Chief Complaint: Results of the Right Shoulder (MRI Right Shoulder Results )    HPI:     History of Present Illness    CHIEF COMPLAINT:  - Right shoulder pain    HPI:  Lazara presents for follow-up regarding right shoulder pain. An MRI of the right shoulder showed no surgical pathology. The orthopedic surgeon suggests that the pain may be originating from the neck rather than the shoulder itself, potentially due to a pinched nerve. She has a history of vertigo and previous surgery resulting in an extended hospital stay.    IMAGING:  - MRI Right Shoulder - demonstrates intact rotator cuff no other surgical pathology noted.    SURGICAL HISTORY:  - Previous surgery: Resulted in prolonged hospitalization.      ROS:  Musculoskeletal: +limb pain  Neurological: +vertigo          Past Medical History:   Diagnosis Date    Anxiety disorder, unspecified     COPD (chronic obstructive pulmonary disease)     Depression     Diabetes mellitus, type 2     Hypertension     YE (obstructive sleep apnea)     Diagnosed years ago-does not sleep with c-pap     Past Surgical History:   Procedure Laterality Date    CHOLECYSTECTOMY      COLONOSCOPY      CORONARY ARTERY BYPASS GRAFT  2017    HYSTERECTOMY      REVERSE TOTAL SHOULDER ARTHROPLASTY Left 4/3/2024    Procedure: ARTHROPLASTY, SHOULDER, TOTAL, REVERSE;  Surgeon: Watson Hennessy MD;  Location: [facility];  Service: Orthopedics;  Laterality: Left;    ROTATOR CUFF REPAIR Right 03/27/2017     Social History[1]    Current Medications[2]  Review of patient's allergies indicates:   Allergen Reactions    Sulfa (sulfonamide antibiotics)        /79 (BP Location: Left arm, Patient Position: Sitting)   Pulse (!) 59   Ht 5' 5" (1.651 m)   Wt 97.1 kg (214 lb)   BMI 35.61 kg/m²     Comprehensive review of systems completed and negative except as per HPI.        Objective:   General: Well-developed, well-nourished.  Neuro: Alert " and oriented x 3.  Psych: Normal mood and affect.  Card: Regular rate and rhythm  Resp: Respirations regular and unlabored    Neck Exam:    No deformity. No tenderness to palpation along the spine. No step off. 4/5 strength right upper extremities. 5/5 strength left upper extremity. Positive upper tract signs. Hyper reflexes. Positive clonus. Normal skin appearance. Sensibility normal.      Assessment:         1. Nontraumatic rotator cuff tear, right    2. Cervical radiculopathy          Plan:       Orders Placed This Encounter    X-Ray Cervical Spine Complete 5 view    MRI Cervical Spine Without Contrast        Imaging and exam findings discussed.     Assessment & Plan    PROCEDURES:  - # Procedures    IMAGING:  - Ordered MRI an x-ray of cervical spine.    REFERRALS:  - Referred to specialist if MRI C Spine shows pathology.    FOLLOW UP:  - Follow up after imaging study.  - Contact office with results if on vacation.               All questions were answered. Patient happy and in agreement with the plan.     This note was generated with the assistance of ambient listening technology. Verbal consent was obtained by the patient and accompanying visitor(s) for the recording of patient appointment to facilitate this note. I attest to having reviewed and edited the generated note for accuracy, though some syntax or spelling errors may persist. Please contact the author of this note for any clarification.         [1]   Social History  Socioeconomic History    Marital status:    Tobacco Use    Smoking status: Former     Current packs/day: 0.00     Types: Cigarettes     Quit date:      Years since quittin.2    Smokeless tobacco: Never   Substance and Sexual Activity    Alcohol use: Never    Drug use: Never   Social History Narrative    ** Merged History Encounter **          Social Drivers of Health     Financial Resource Strain: Low Risk  (2024)    Overall Financial Resource Strain (CARDIA)      "Difficulty of Paying Living Expenses: Not hard at all   Food Insecurity: No Food Insecurity (2024)    Hunger Vital Sign     Worried About Running Out of Food in the Last Year: Never true     Ran Out of Food in the Last Year: Never true   Transportation Needs: No Transportation Needs (2024)    PRAPARE - Transportation     Lack of Transportation (Medical): No     Lack of Transportation (Non-Medical): No   Stress: Stress Concern Present (2024)    [facility] of Occupational Health - Occupational Stress Questionnaire     Feeling of Stress : To some extent   Housing Stability: Low Risk  (2024)    Housing Stability Vital Sign     Unable to Pay for Housing in the Last Year: No     Number of Places Lived in the Last Year: 1     Unstable Housing in the Last Year: No   [2]   Current Outpatient Medications:     albuterol sulfate (PROAIR RESPICLICK) 90 mcg/actuation inhaler, Inhale 2 puffs into the lungs every 6 (six) hours as needed for Wheezing. Rescue, Disp: , Rfl:     albuterol-ipratropium (DUO-NEB) 2.5 mg-0.5 mg/3 mL nebulizer solution, Take 3 mLs by nebulization every 6 (six) hours as needed for Wheezing. Rescue, Disp: , Rfl:     amLODIPine (NORVASC) 5 MG tablet, 1 tablet Orally Once a day, Disp: , Rfl:     atorvastatin (LIPITOR) 40 MG tablet, 1 tablet Orally Once a day, Disp: , Rfl:     BD KATY 2ND GEN PEN NEEDLE 32 gauge x 5/32" Ndle, SMARTSIG:injection 3 Times Daily, Disp: , Rfl:     buPROPion (WELLBUTRIN SR) 150 MG TBSR 12 hr tablet, Take 150 mg by mouth every morning., Disp: , Rfl:     cetirizine 10 mg chewable tablet, Take 10 mg by mouth once daily., Disp: , Rfl:     chlorhexidine (PERIDEX) 0.12 % solution, SMARTSI-10 Milliliter(s) By Mouth 3 Times Daily, Disp: , Rfl:     diclofenac sodium (VOLTAREN) 1 % Gel, daily as needed., Disp: , Rfl:     DULoxetine (CYMBALTA) 60 MG capsule, Take 60 mg by mouth nightly. For restless legs, Disp: , Rfl:     EScitalopram oxalate (LEXAPRO) 20 MG tablet, " Take 20 mg by mouth once daily., Disp: , Rfl:     fluticasone-umeclidin-vilanter (TRELEGY ELLIPTA) 100-62.5-25 mcg DsDv, Inhale 1 puff into the lungs once daily., Disp: , Rfl:     ibuprofen (ADVIL,MOTRIN) 200 MG tablet, Take 200 mg by mouth every 6 (six) hours as needed for Pain., Disp: , Rfl:     insulin (BASAGLAR KWIKPEN U-100 INSULIN) glargine 100 units/mL SubQ pen, Inject 52 Units into the skin every evening., Disp: , Rfl:     insulin lispro 100 unit/mL pen, SMARTSI-14 Unit(s) SUB-Q 3 Times Daily, Disp: , Rfl:     isosorbide mononitrate (IMDUR) 60 MG 24 hr tablet, Take 60 mg by mouth once daily., Disp: , Rfl:     lactulose (CHRONULAC) 10 gram/15 mL solution, Take 15 mLs by mouth., Disp: , Rfl:     losartan-hydrochlorothiazide 50-12.5 mg (HYZAAR) 50-12.5 mg per tablet, Take 1 tablet by mouth once daily., Disp: , Rfl:     meclizine (ANTIVERT) 25 mg tablet, Take 25 mg by mouth 3 (three) times daily as needed for Dizziness., Disp: , Rfl:     montelukast (SINGULAIR) 10 mg tablet, Take 10 mg by mouth every evening., Disp: , Rfl:     nitroGLYCERIN (NITROSTAT) 0.4 MG SL tablet, Place 0.4 mg under the tongue every 5 (five) minutes as needed for Chest pain., Disp: , Rfl:     ondansetron (ZOFRAN-ODT) 8 MG TbDL, Take 8 mg by mouth 3 (three) times daily as needed., Disp: , Rfl:     ONETOUCH ULTRA TEST Strp, 3 (three) times daily., Disp: , Rfl:     potassium chloride (MICRO-K) 8 mEq CpSR, Take 8 mEq by mouth., Disp: , Rfl:     promethazine (PHENERGAN) 25 MG tablet, Take 25 mg by mouth 3 (three) times daily., Disp: , Rfl:     propranoloL (INDERAL) 10 MG tablet, Take 10 mg by mouth 3 (three) times daily., Disp: , Rfl:     senna-docusate 8.6-50 mg (PERICOLACE) 8.6-50 mg per tablet, Take 1 tablet by mouth daily as needed for Constipation., Disp: 30 tablet, Rfl: 2    traMADoL (ULTRAM) 50 mg tablet, Take 1 tablet (50 mg total) by mouth every 4 (four) hours as needed for Pain., Disp: 20 tablet, Rfl: 0    VALIUM 10 mg Tab, 1  tablet as needed Orally Twice a day for 30 days, Disp: , Rfl:     VRAYLAR 1.5 mg Cap, Take 1.5 mg by mouth once daily., Disp: , Rfl:     aspirin (ECOTRIN) 81 MG EC tablet, Take 1 tablet (81 mg total) by mouth 2 (two) times a day. (Patient not taking: Reported on 4/17/2025), Disp: 30 tablet, Rfl: 0    doxepin (SINEQUAN) 10 MG capsule, Take 10 mg by mouth every evening., Disp: , Rfl:     fluticasone propionate (FLONASE) 50 mcg/actuation nasal spray, 1 spray by Each Nostril route 2 (two) times daily. (Patient not taking: Reported on 4/17/2025), Disp: , Rfl:     linaCLOtide (LINZESS) 145 mcg Cap capsule, Take 145 mcg by mouth before breakfast., Disp: , Rfl:

## 2025-05-19 ENCOUNTER — TELEPHONE (OUTPATIENT)
Dept: ORTHOPEDICS | Facility: CLINIC | Age: 75
End: 2025-05-19
Payer: MEDICARE

## 2025-05-19 DIAGNOSIS — M54.12 CERVICAL RADICULOPATHY: Primary | ICD-10-CM

## 2025-05-19 NOTE — TELEPHONE ENCOUNTER
Spoke to patient and gave her results of MRI and asked her if she would like a referral to non op spine doc or neurosurgeon if she is interested in surgery. She would like to have surgery if she needs it. Referral entered and patient will wait for provider to get in touch with her.

## 2025-05-19 NOTE — TELEPHONE ENCOUNTER
----- Message from CLAUDIA Bal sent at 5/19/2025 11:07 AM CDT -----  Regarding: MRI cervical spine  Please call this patient and let her know that her cervical spine MRI results demonstrated cervical spine stenosis.  This means that she has tightening around her spinal cord that is likely leading to her upper extremity symptoms.  Please have her follow up with nonoperative spine specialist or neurosurgeon of her choice.  She can follow up with our clinic as needed.

## 2025-06-02 ENCOUNTER — TELEPHONE (OUTPATIENT)
Dept: NEUROSURGERY | Facility: CLINIC | Age: 75
End: 2025-06-02
Payer: MEDICARE

## 2025-06-02 DIAGNOSIS — M54.2 NECK PAIN: Primary | ICD-10-CM

## 2025-06-04 NOTE — PROGRESS NOTES
[facility]  History & Physical  Neurosurgery      Lazara Casillas   [id number]   1950       SUBJECTIVE:     CHIEF COMPLAINT:  Neck pain, lower back pain lower extremity pain and weakness    HPI:  Lazara Casillas is a 74 y.o. female who presents for neurosurgical evaluation at the recommendation of Dr. Watson Hennessy. The patient presents today describing chronic right-sided neck pain occasionally radiating into the back of the head and up into the right temporal.  The patient also is describing chronic lower back pain with pain radiating into the bilateral lower extremities.  She will occasionally have a buckling sensation of the right leg which has caused several near falls.  The patient states these symptoms have been present for some time without any injury or trauma to account for it.  She does feel her cervical symptoms have progressed over the last several months.  She is denying numbness at this time.  She is denying any changes in bowel or bladder function.  She does ambulate with a walker secondary to weakness in the lower extremities.  She is rating her pain a 6/10 today.  She has attempted ibuprofen in the past with no significant change in symptoms.  She is accompanied by her daughter, Christiane and her significant other, Mr. Marquez today.    Past Medical History:   Diagnosis Date    Anxiety disorder, unspecified     Cervical radiculopathy     COPD (chronic obstructive pulmonary disease)     Coronary artery disease without angina pectoris, unspecified vessel or lesion type, unspecified whether native or transplanted heart     Coronary artery vasospasm     Depression     Diabetes mellitus, type 2     Falls frequently     Gait abnormality     Hypertension     Nontraumatic rotator cuff tear, right     YE (obstructive sleep apnea)     Diagnosed years ago-does not sleep with c-pap    Recurrent syncope     Status post reverse total arthroplasty of left shoulder        Past Surgical History:    Procedure Laterality Date    CHOLECYSTECTOMY      COLONOSCOPY      CORONARY ARTERY BYPASS GRAFT  2017    HYSTERECTOMY      REVERSE TOTAL SHOULDER ARTHROPLASTY Left 4/3/2024    Procedure: ARTHROPLASTY, SHOULDER, TOTAL, REVERSE;  Surgeon: Watson Hennessy MD;  Location: [facility];  Service: Orthopedics;  Laterality: Left;    ROTATOR CUFF REPAIR Right 2017       Family History   Problem Relation Name Age of Onset    No Known Problems Mother      No Known Problems Father         Social History     Socioeconomic History    Marital status:    Tobacco Use    Smoking status: Former     Current packs/day: 0.00     Types: Cigarettes     Quit date:      Years since quittin.4     Passive exposure: Past    Smokeless tobacco: Never   Substance and Sexual Activity    Alcohol use: Never    Drug use: Never   Social History Narrative    ** Merged History Encounter **          Social Drivers of Health     Financial Resource Strain: Low Risk  (2024)    Overall Financial Resource Strain (CARDIA)     Difficulty of Paying Living Expenses: Not hard at all   Food Insecurity: No Food Insecurity (2025)    [facility]    Hunger Vital Sign     Worried About Running Out of Food in the Last Year: Never true     Ran Out of Food in the Last Year: Never true   Transportation Needs: No Transportation Needs (2025)    [facility]    PRAPARE - Transportation     Lack of Transportation (Medical): No     Lack of Transportation (Non-Medical): No   Stress: Stress Concern Present (2024)    [place] of Occupational Health - Occupational Stress Questionnaire     Feeling of Stress : To some extent   Housing Stability: Low Risk  (2025)    [facility]     "Housing Stability Vital Sign     Unable to Pay for Housing in the Last Year: No     Number of Times Moved in the Last Year: 0     Homeless in the Last Year: No        Review of patient's allergies indicates:   Allergen Reactions    Sulfa (sulfonamide antibiotics)         Current Outpatient Medications   Medication Instructions    albuterol (PROVENTIL/VENTOLIN HFA) 90 mcg/actuation inhaler 2 puffs, Every 4 hours PRN    albuterol-ipratropium (DUO-NEB) 2.5 mg-0.5 mg/3 mL nebulizer solution 3 mLs, Every 6 hours PRN    aspirin (ECOTRIN) 81 mg, Oral, 2 times daily    BD KATY 2ND GEN PEN NEEDLE 32 gauge x 32" Ndle SMARTSIG:injection 3 Times Daily    buPROPion (WELLBUTRIN SR) 150 mg, Every morning    cyclobenzaprine (FLEXERIL) 10 mg, 2 times daily PRN    EScitalopram oxalate (LEXAPRO) 20 mg, Daily    famotidine (PEPCID) 40 mg, Nightly    fluticasone propionate (FLONASE) 50 mcg/actuation nasal spray 1 spray, 2 times daily    fluticasone-umeclidin-vilanter (TRELEGY ELLIPTA) 100-62.5-25 mcg DsDv 1 puff, Daily    INGREZZA 40 mg Cap 1 capsule    insulin glargine U-100 (Lantus) (BASAGLAR KWIKPEN U-100 INSULIN) 52 Units, Nightly    insulin lispro 100 unit/mL pen SMARTSI-14 Unit(s) SUB-Q 3 Times Daily    isosorbide mononitrate (IMDUR) 60 mg, Daily    linaCLOtide (LINZESS) 145 mcg, Before breakfast    losartan-hydrochlorothiazide 50-12.5 mg (HYZAAR) 50-12.5 mg per tablet 1 tablet, Daily    meclizine (ANTIVERT) 25 mg, 3 times daily PRN    montelukast (SINGULAIR) 10 mg, Nightly    ONETOUCH ULTRA TEST Strp 3 times daily    potassium chloride (MICRO-K) 8 mEq CpSR 8 mEq    propranoloL (INDERAL) 10 mg, 3 times daily    tolterodine (DETROL LA) 2 mg    VRAYLAR 1.5 mg, Daily          Review of Systems   Constitutional:  Negative for chills, fever and weight loss.   HENT:  Negative for congestion, hearing loss, nosebleeds and tinnitus.    Eyes:  Negative for blurred vision, double vision and photophobia.   Respiratory:  Negative for " "cough, shortness of breath and wheezing.    Cardiovascular:  Negative for chest pain, palpitations and leg swelling.   Gastrointestinal:  Negative for constipation, diarrhea, nausea and vomiting.   Genitourinary:  Negative for dysuria, frequency and urgency.   Musculoskeletal:  Positive for back pain and neck pain. Negative for falls.   Skin:  Negative for itching and rash.   Neurological:  Positive for weakness (R>LLE). Negative for dizziness, tingling, tremors, sensory change, speech change, seizures, loss of consciousness and headaches.   Psychiatric/Behavioral:  Negative for depression, hallucinations and memory loss. The patient is not nervous/anxious.        OBJECTIVE:     Visit Vitals  /71 (BP Location: Left arm, Patient Position: Sitting)   Pulse 66   Resp 16   Ht 5' 5" (1.651 m)   Wt 95.9 kg (211 lb 6.4 oz)   BMI 35.18 kg/m²        Physical Exam    General:  Pleasant, Well-nourished, Well-groomed.    Cardiovascular:  Neck is supple    Lungs:  Breathing is quiet, non-lablored    Abdomen:  Soft, non-tender, non-distended.    Neurological:  Muscle strength against resistance:   Right Left   Deltoid (C5) 5/5 5/5   Biceps (C5/6) 5/5 5/5   Wrist Flexors (C5/6) 5/5 5/5   Triceps (C7) 5/5 5/5   Wrist extension (C7) 5/5 5/5   Finger abduction (C8) 5/5 5/5    5/5 5/5        Hip abduction 5/5 5/5   Hip adduction 5/5 5/5   Hip flexion (L2) 5/5 5/5   Knee extension (L3) 5/5 5/5   Knee flexion (L4) 5/5 5/5   Dorsiflexion (L5) 5/5 5/5   EHL (L5) 5/5 5/5   Plantar flexion (S1) 5/5 5/5   Sensation is intact to primary modalities in bilateral upper and lower extremities.    Reflexes:   Right Left   Triceps (C7) 1+ 1+   Biceps (C5) 1+ 1+   Brachioradialis (C6) 1+ 1+   Patellar (L4) 1+ 1+   Achilles (S1) 1+ 1+   Negative Tinel's, Clonus and Medrano bilaterally.  Gait is normal and cautious with a rolling walker  Coordination is normal.  No tremor noted.    Imaging:  All pertinent neuroimaging independently reviewed. " Discussed these findings in detail with the patient.    X-rays of the cervical spine dated 4/17/2025 reveal multilevel degenerative changes with disc space narrowing at C4-5.  Foraminal stenosis noted greatest on the right at C3-4, C4-5 and C5-6.    MRI of the cervical spine dated 5/16/2025 reveals exaggeration of normal cervical lordosis with multilevel degenerative changes.  C2-3 with disc desiccation, mild disc space narrowing, right-greater-than-left facet hypertrophy with no significant canal or foraminal stenosis.  C3-4 with disc desiccation and moderate disc space narrowing, moderate broad-based disc osteophyte complex with mild-to-moderate canal stenosis, severe bilateral uncovertebral and facet hypertrophy resulting in severe bilateral foraminal stenosis.  C4-5 with disc desiccation, Modic type 2 endplate degenerative changes anterior osteophyte formation with moderate posterior disc bulging, mild canal stenosis, severe right and mild left uncovertebral hypertrophy with mild left facet hypertrophy results in severe right foraminal stenosis.  C5-6 with disc desiccation and Modic type 2 endplate changes, mild disc space narrowing with mild bulging, mild canal stenosis, bilateral uncovertebral hypertrophy with mild right facet hypertrophy resulting in severe bilateral foraminal stenosis.  C6-7 with disc desiccation mild disc space narrowing, shallow bulging with bilateral uncovertebral hypertrophy and mild bilateral facet hypertrophy resulting in moderate right and mild left foraminal stenosis.  C7-T1 with disc desiccation mild disc space narrowing, no significant canal or foraminal stenosis is noted.    X-rays of the cervical spine dated 6/19/2025 reveal disc space narrowing at C2-3 through C6-7 with anterior osteophyte bridging at C4-5.  No abnormal motion on extension or flexion view with degenerative changes of the posterior elements throughout the cervical spine.    ASSESSMENT:       ICD-10-CM ICD-9-CM    1. Occipital neuralgia of right side  M54.81 723.8   2. Cervical radiculopathy  M54.12 723.4   3. Lumbar radiculopathy  M54.16 724.4     PLAN:     1. Occipital neuralgia of right side (Primary)  - Ambulatory referral/consult to Pain Clinic; Future  - Ambulatory Referral/Consult to Physical Therapy; Future    2. Cervical radiculopathy  - Ambulatory referral/consult to Neurosurgery  - Ambulatory Referral/Consult to Physical Therapy; Future    3. Lumbar radiculopathy  - X-Ray Lumbar Spine Ap Lateral w/Flex Ext; Future  - Ambulatory referral/consult to Pain Clinic; Future    Lazara Casillas presents today ongoing right-sided neck pain radiating into the back of the head and into the right temporal region.  She also struggles with chronic lower back pain with right-greater-than-left lower extremity weakness that has caused multiple falls.  The patient states in the past she was told she had multiple bone spurs into the lower back therefore we will obtain updated x-rays of the lumbar spine today.  There was some confusion during our visit today as the patient and her family were under the impression the patient was presenting to our clinic today to schedule surgery.  The patient's has been, Mr. Marquez advises me that they were told by Dr. Hennessy's office that the patient would require surgery as there were something wrapping around her neck that is causing her weakness in the lower extremities and dizziness.  I reviewed the patient's MRI with her 2 separate times during our visit today as well as thoroughly reviewed the patient's radiology report for these images.  I expressed I am not seeing anything that is compressing the patient's spinal cord.  I advised the patient and her family that a CTA of the head and neck could be ordered to evaluate blood flow that could cause dizziness.  They declined this study.  The patient has previously been provided in order to begin physical therapy although was told she could not begin  this modality until she was released by Neurosurgery per the patient's daughter report.  I do feel that physical therapy would be beneficial for the patient at this time.  We will also set her up for a consultation with Dr. Bach at this time regarding possible occipital nerve injection versus other conservative management options.  We will have the patient return to clinic in approximately 3 months to monitor her symptoms.  This plan was discussed with the patient, her daughter and her  in clinic today and they are in agreement to move forward.    Follow up in about 3 months (around 9/19/2025) for PT Follow Up, With Imaging Prior to Appt.      E/M Level Based On Time:   15 minutes spent on reviewing chart, which includes interpreting lab results and diagnostic tests.   80 minutes spent in the room with the patient performing a history and physical exam, counseling or educating the patient/caregiver, prescribing medications, ordering labwork/diagnostic tests, or placing referrals.   0 minutes spent collaborating plan of care with physician.   5 minutes spent documenting all relevant clinical informationin the electronic health record.     Total Time Spent: 100 minutes       CLAUDIA Ramirez  [facility]  Neurosurgery Clinic    Addendum:  Following our visit, upon scheduling the patient to follow up in my clinic, the patient's  expressed frustration that they would not be following up with Dr. Rodriguez.  I contacted the patient and her family after the visit and once again offered an appointment to follow up with Dr. Rodriguez as was originally declined by the patient and her  during her office visit.  We will have the patient return to clinic to discuss additional options with Dr. Rodriguez in approximately 3 months once she has had some time to complete conservative management options as well as physical therapy.  The patient's lumbar x-rays were discussed with her daughter  during this conversation as well.  18 minutes was spent in discussion during our phone call.    Disclaimer:  This note is prepared using voice recognition software and as such is likely to have errors despite attempts at proofreading. Please contact me for questions.

## 2025-06-19 ENCOUNTER — TELEPHONE (OUTPATIENT)
Dept: NEUROSURGERY | Facility: CLINIC | Age: 75
End: 2025-06-19

## 2025-06-19 ENCOUNTER — HOSPITAL ENCOUNTER (OUTPATIENT)
Dept: RADIOLOGY | Facility: HOSPITAL | Age: 75
Discharge: HOME | End: 2025-06-19
Attending: NURSE PRACTITIONER
Payer: MEDICARE

## 2025-06-19 ENCOUNTER — OFFICE VISIT (OUTPATIENT)
Dept: NEUROSURGERY | Facility: CLINIC | Age: 75
End: 2025-06-19
Payer: MEDICARE

## 2025-06-19 ENCOUNTER — HOSPITAL ENCOUNTER (OUTPATIENT)
Dept: RADIOLOGY | Facility: HOSPITAL | Age: 75
Discharge: HOME | End: 2025-06-19
Attending: STUDENT IN AN ORGANIZED HEALTH CARE EDUCATION/TRAINING PROGRAM
Payer: MEDICARE

## 2025-06-19 VITALS
RESPIRATION RATE: 16 BRPM | BODY MASS INDEX: 35.22 KG/M2 | HEIGHT: 65 IN | HEART RATE: 66 BPM | DIASTOLIC BLOOD PRESSURE: 71 MMHG | WEIGHT: 211.38 LBS | SYSTOLIC BLOOD PRESSURE: 106 MMHG

## 2025-06-19 DIAGNOSIS — M54.16 LUMBAR RADICULOPATHY: ICD-10-CM

## 2025-06-19 DIAGNOSIS — M54.12 CERVICAL RADICULOPATHY: ICD-10-CM

## 2025-06-19 DIAGNOSIS — M54.81 OCCIPITAL NEURALGIA OF RIGHT SIDE: Primary | ICD-10-CM

## 2025-06-19 DIAGNOSIS — M54.2 NECK PAIN: ICD-10-CM

## 2025-06-19 PROCEDURE — 72110 X-RAY EXAM L-2 SPINE 4/>VWS: CPT | Mod: TC

## 2025-06-19 PROCEDURE — 72040 X-RAY EXAM NECK SPINE 2-3 VW: CPT | Mod: TC

## 2025-06-19 RX ORDER — ALBUTEROL SULFATE 90 UG/1
2 INHALANT RESPIRATORY (INHALATION) EVERY 4 HOURS PRN
COMMUNITY
Start: 2025-03-17

## 2025-06-19 RX ORDER — PROMETHAZINE HYDROCHLORIDE AND DEXTROMETHORPHAN HYDROBROMIDE 6.25; 15 MG/5ML; MG/5ML
SYRUP ORAL
COMMUNITY
Start: 2025-04-23 | End: 2025-06-19

## 2025-06-19 RX ORDER — ISOSORBIDE MONONITRATE 30 MG/1
30 TABLET, EXTENDED RELEASE ORAL EVERY MORNING
COMMUNITY
End: 2025-06-19

## 2025-06-19 RX ORDER — CYCLOBENZAPRINE HCL 10 MG
10 TABLET ORAL 2 TIMES DAILY PRN
COMMUNITY
Start: 2025-04-13

## 2025-06-19 RX ORDER — FAMOTIDINE 40 MG/1
40 TABLET, FILM COATED ORAL NIGHTLY
COMMUNITY

## 2025-06-19 RX ORDER — VALBENAZINE 40 MG/1
1 CAPSULE ORAL
COMMUNITY
Start: 2025-05-09

## 2025-06-19 RX ORDER — TOLTERODINE 2 MG/1
2 CAPSULE, EXTENDED RELEASE ORAL
COMMUNITY

## 2025-06-19 RX ORDER — HYDROCODONE BITARTRATE AND ACETAMINOPHEN 7.5; 325 MG/1; MG/1
1 TABLET ORAL EVERY 6 HOURS PRN
COMMUNITY
Start: 2025-05-15 | End: 2025-06-19

## 2025-06-19 NOTE — TELEPHONE ENCOUNTER
Spoke with the patient's daughter, Christiane and patient once again regarding her lumbar x-rays as well as follow up visit and plan going forward.  We will move forward with outpatient physical therapy for both the cervical and lumbar spine at this time.  We will refer the patient on for consultation with Dr. Bach.  The patient will continue with previously prescribed medications.  I again explained to the patient's daughter that I am not seeing anything wrapping around her neck as the patient was told per Dr. Hennessy.  I am not seeing any threat to her spinal cord at this time.  It was again explained that she does have some nerves that are being crowded in the neck and likely in the lumbar spine that could be causing her symptoms as well as likely occipital neuralgia.  We will have the patient return to clinic to follow up with Dr. Rodriguez regarding additional treatment options going forward.  They were advised to contact us with any progression in his symptoms or concerns prior to this follow up visit.  18 minutes was spent in discussion today.

## 2025-06-20 NOTE — TELEPHONE ENCOUNTER
I called for patient and patient asked can I speak with her daughter. I spoke to the daughter Christiane to get patient scheduled with Dr. Rodriguez. I scheduled patient with Dr. Rodriguez on 9/3/25 at 10:30. Patient's daughter verbalized understanding of appointment date and time.

## 2025-07-03 ENCOUNTER — TELEPHONE (OUTPATIENT)
Dept: NEUROSURGERY | Facility: CLINIC | Age: 75
End: 2025-07-03
Payer: MEDICARE

## 2025-07-03 NOTE — TELEPHONE ENCOUNTER
I spoke with the patient to see if she has heard from [facility] regarding an appointment. She stated she is scheduled to go sometime around 8/8/25 but is unsure if she will proceed with this as her and her family are discussing some things.

## 2025-07-10 ENCOUNTER — TELEPHONE (OUTPATIENT)
Dept: ORTHOPEDICS | Facility: CLINIC | Age: 75
End: 2025-07-10
Payer: MEDICARE

## 2025-07-10 NOTE — TELEPHONE ENCOUNTER
Patient left a voicemail stating she is having shoulder pain. Tried calling patient back there was no voicemail set up to leave a message

## 2025-08-04 ENCOUNTER — TELEPHONE (OUTPATIENT)
Dept: ORTHOPEDICS | Facility: CLINIC | Age: 75
End: 2025-08-04
Payer: MEDICARE

## 2025-08-04 NOTE — TELEPHONE ENCOUNTER
Copied from CRM #[id number]. Topic: General Inquiry - Patient Advice  >> Aug 4, 2025  3:26 PM Amairani wrote:  .Who Called: Lazara Casillas    Caller is requesting assistance/information from provider's office.      Preferred Method of Contact: Phone Call  Patient's Preferred Phone Number on File: [phone]   Best Call Back Number, if different: [phone] Christiane  Additional Information: pt needs a disc of mri to explain reason for mri on neck so she can choose a neurologist they don't know what took look for so they can see her

## 2025-09-02 ENCOUNTER — TELEPHONE (OUTPATIENT)
Dept: ORTHOPEDICS | Facility: CLINIC | Age: 75
End: 2025-09-02
Payer: MEDICARE

## 2025-09-03 ENCOUNTER — OFFICE VISIT (OUTPATIENT)
Dept: NEUROSURGERY | Facility: CLINIC | Age: 75
End: 2025-09-03
Payer: MEDICARE

## 2025-09-03 VITALS
BODY MASS INDEX: 35.16 KG/M2 | RESPIRATION RATE: 16 BRPM | HEIGHT: 65 IN | WEIGHT: 211 LBS | HEART RATE: 71 BPM | SYSTOLIC BLOOD PRESSURE: 117 MMHG | DIASTOLIC BLOOD PRESSURE: 80 MMHG

## 2025-09-03 DIAGNOSIS — M41.9 SCOLIOSIS, UNSPECIFIED SCOLIOSIS TYPE, UNSPECIFIED SPINAL REGION: ICD-10-CM

## 2025-09-03 DIAGNOSIS — M54.16 LUMBAR RADICULOPATHY: ICD-10-CM

## 2025-09-03 DIAGNOSIS — M54.81 OCCIPITAL NEURALGIA OF RIGHT SIDE: ICD-10-CM

## 2025-09-03 DIAGNOSIS — M41.50 DEGENERATIVE SCOLIOSIS IN ADULT PATIENT: Primary | ICD-10-CM

## 2025-09-03 DIAGNOSIS — M54.12 CERVICAL RADICULOPATHY: ICD-10-CM

## 2025-09-03 PROCEDURE — 1101F PT FALLS ASSESS-DOCD LE1/YR: CPT | Mod: CPTII,,, | Performed by: STUDENT IN AN ORGANIZED HEALTH CARE EDUCATION/TRAINING PROGRAM

## 2025-09-03 PROCEDURE — 1125F AMNT PAIN NOTED PAIN PRSNT: CPT | Mod: CPTII,,, | Performed by: STUDENT IN AN ORGANIZED HEALTH CARE EDUCATION/TRAINING PROGRAM

## 2025-09-03 PROCEDURE — 3288F FALL RISK ASSESSMENT DOCD: CPT | Mod: CPTII,,, | Performed by: STUDENT IN AN ORGANIZED HEALTH CARE EDUCATION/TRAINING PROGRAM

## 2025-09-03 PROCEDURE — 1160F RVW MEDS BY RX/DR IN RCRD: CPT | Mod: CPTII,,, | Performed by: STUDENT IN AN ORGANIZED HEALTH CARE EDUCATION/TRAINING PROGRAM

## 2025-09-03 PROCEDURE — 99214 OFFICE O/P EST MOD 30 MIN: CPT | Mod: ,,, | Performed by: STUDENT IN AN ORGANIZED HEALTH CARE EDUCATION/TRAINING PROGRAM

## 2025-09-03 PROCEDURE — 3074F SYST BP LT 130 MM HG: CPT | Mod: CPTII,,, | Performed by: STUDENT IN AN ORGANIZED HEALTH CARE EDUCATION/TRAINING PROGRAM

## 2025-09-03 PROCEDURE — 3079F DIAST BP 80-89 MM HG: CPT | Mod: CPTII,,, | Performed by: STUDENT IN AN ORGANIZED HEALTH CARE EDUCATION/TRAINING PROGRAM

## 2025-09-03 PROCEDURE — 1159F MED LIST DOCD IN RCRD: CPT | Mod: CPTII,,, | Performed by: STUDENT IN AN ORGANIZED HEALTH CARE EDUCATION/TRAINING PROGRAM

## 2025-09-03 RX ORDER — BLOOD-GLUCOSE SENSOR
EACH MISCELLANEOUS
COMMUNITY
Start: 2025-06-25

## 2025-09-03 RX ORDER — HYDROCODONE BITARTRATE AND ACETAMINOPHEN 7.5; 325 MG/1; MG/1
1 TABLET ORAL EVERY 6 HOURS PRN
COMMUNITY
Start: 2025-08-28

## 2025-09-03 RX ORDER — DIAZEPAM 10 MG/1
10 TABLET ORAL 2 TIMES DAILY PRN
COMMUNITY
Start: 2025-08-25

## 2025-09-03 RX ORDER — HYDROCODONE BITARTRATE AND ACETAMINOPHEN 5; 325 MG/1; MG/1
1 TABLET ORAL EVERY 8 HOURS PRN
COMMUNITY
Start: 2025-08-20

## 2025-09-03 RX ORDER — AMLODIPINE BESYLATE 5 MG/1
5 TABLET ORAL DAILY
COMMUNITY

## 2025-09-03 RX ORDER — PROMETHAZINE HYDROCHLORIDE 25 MG/1
25 TABLET ORAL 3 TIMES DAILY PRN
COMMUNITY
Start: 2025-07-20

## 2025-09-03 RX ORDER — TRAMADOL HYDROCHLORIDE 50 MG/1
50 TABLET, FILM COATED ORAL EVERY 6 HOURS PRN
COMMUNITY
Start: 2025-06-24

## 2025-09-03 RX ORDER — MUPIROCIN 20 MG/G
22 OINTMENT TOPICAL 2 TIMES DAILY
COMMUNITY
Start: 2025-08-15

## 2025-09-03 RX ORDER — TRIAMCINOLONE ACETONIDE 1 MG/G
0.1 PASTE DENTAL 2 TIMES DAILY
COMMUNITY
Start: 2025-08-26

## 2025-09-03 RX ORDER — METOPROLOL SUCCINATE 25 MG/1
25 TABLET, EXTENDED RELEASE ORAL DAILY
COMMUNITY

## 2025-09-03 RX ORDER — NITROGLYCERIN 0.4 MG/1
0.4 TABLET SUBLINGUAL
COMMUNITY

## 2025-09-03 RX ORDER — ONDANSETRON 4 MG/1
4 TABLET, ORALLY DISINTEGRATING ORAL EVERY 8 HOURS PRN
COMMUNITY
Start: 2025-08-20

## 2025-09-03 RX ORDER — ATORVASTATIN CALCIUM 40 MG/1
40 TABLET, FILM COATED ORAL DAILY
COMMUNITY

## 2025-09-03 RX ORDER — ALBUTEROL SULFATE 1.25 MG/3ML
1.25 SOLUTION RESPIRATORY (INHALATION) 4 TIMES DAILY PRN
COMMUNITY
Start: 2025-06-24

## 2025-09-03 RX ORDER — CHLORHEXIDINE GLUCONATE ORAL RINSE 1.2 MG/ML
15 SOLUTION DENTAL 3 TIMES DAILY
COMMUNITY
Start: 2025-08-26

## 2025-09-03 RX ORDER — LOSARTAN POTASSIUM AND HYDROCHLOROTHIAZIDE 25; 100 MG/1; MG/1
1 TABLET ORAL DAILY
COMMUNITY

## (undated) DEVICE — KIT IRR SUCTION HND PIECE

## (undated) DEVICE — NDL 20GX1-1/2IN IB

## (undated) DEVICE — SUT VICRYL 2-0 8-18 CP-2

## (undated) DEVICE — GLOVE SENSICARE PI GRN 9

## (undated) DEVICE — DRAPE INCISE IOBAN 2 23X23IN

## (undated) DEVICE — SYS IRRISEPT 450ML0.05% CHG

## (undated) DEVICE — GUIDE WIRE
Type: IMPLANTABLE DEVICE | Site: SHOULDER | Status: NON-FUNCTIONAL
Brand: TORNIER PERFORM
Removed: 2024-04-03

## (undated) DEVICE — GUIDE PIN
Type: IMPLANTABLE DEVICE | Site: SHOULDER | Status: NON-FUNCTIONAL
Brand: TORNIER SIMPLICITI
Removed: 2024-04-03

## (undated) DEVICE — SUT FIBERTAPE 1.3MM TAILS

## (undated) DEVICE — SUT NICELOOP N ABSRB GREEN #5

## (undated) DEVICE — ELECTRODE NEEDLE 2.8IN

## (undated) DEVICE — DRAPE SHOULDER BEACH CHAIR

## (undated) DEVICE — SPONGE X-RAY LAP DETCT 18X18IN

## (undated) DEVICE — NDL 26.5 TAPR CRV XLOOP

## (undated) DEVICE — DRESSING TRANS 4X4 3/4

## (undated) DEVICE — GLOVE SENSICARE PI GRN 6.5

## (undated) DEVICE — CAUTERY TIP POLISHER

## (undated) DEVICE — ELECTRODE PATIENT RETURN DISP

## (undated) DEVICE — COVER MAYO STAND REINFRCD 30

## (undated) DEVICE — COVER TABLE HVY DTY 60X90IN

## (undated) DEVICE — SUT MONOCRYL 3-0 PS-2 UND

## (undated) DEVICE — SOL NACL IRR 3000ML

## (undated) DEVICE — BIT DRILL PERIPH SCREW 3.2MM

## (undated) DEVICE — KIT SURGICAL TURNOVER

## (undated) DEVICE — Device

## (undated) DEVICE — GOWN POLY REINF X-LONG 2XL

## (undated) DEVICE — HOOD FLYTE SURGICOOL

## (undated) DEVICE — GLOVE SENSICARE PI MICRO 6.5

## (undated) DEVICE — COVER EQUIPMENT 36X25

## (undated) DEVICE — ADHESIVE DERMABOND ADVANCED

## (undated) DEVICE — DRAPE FULL SHEET 70X100IN

## (undated) DEVICE — APPLICATOR CHLORAPREP ORN 26ML

## (undated) DEVICE — BLADE SAG DUAL CUT 25X90MM

## (undated) DEVICE — KIT TRIMANO

## (undated) DEVICE — TAPE SUT WH/BL CC NDL 1.7MM

## (undated) DEVICE — DRAPE STERI U-SHAPED 47X51IN

## (undated) DEVICE — PILLOW FACE ADLT FOAM W/VELCRO

## (undated) DEVICE — SUT VICRYL+ 1 CT1 18IN

## (undated) DEVICE — GLOVE SENSICARE PI MICRO 8.5

## (undated) DEVICE — SOL NACL IRR 1000ML BTL

## (undated) DEVICE — SYR 30CC LUER LOCK